# Patient Record
Sex: FEMALE | Race: WHITE | NOT HISPANIC OR LATINO | Employment: FULL TIME | URBAN - METROPOLITAN AREA
[De-identification: names, ages, dates, MRNs, and addresses within clinical notes are randomized per-mention and may not be internally consistent; named-entity substitution may affect disease eponyms.]

---

## 2017-05-03 ENCOUNTER — ALLSCRIPTS OFFICE VISIT (OUTPATIENT)
Dept: OTHER | Facility: OTHER | Age: 42
End: 2017-05-03

## 2017-05-03 DIAGNOSIS — Z12.31 ENCOUNTER FOR SCREENING MAMMOGRAM FOR MALIGNANT NEOPLASM OF BREAST: ICD-10-CM

## 2017-05-03 DIAGNOSIS — K21.9 GASTRO-ESOPHAGEAL REFLUX DISEASE WITHOUT ESOPHAGITIS: ICD-10-CM

## 2017-05-03 DIAGNOSIS — I83.891 VARICOSE VEINS OF RIGHT LOWER EXTREMITIES WITH OTHER COMPLICATIONS: ICD-10-CM

## 2017-05-03 DIAGNOSIS — E03.9 HYPOTHYROIDISM: ICD-10-CM

## 2017-05-10 ENCOUNTER — HOSPITAL ENCOUNTER (OUTPATIENT)
Dept: NON INVASIVE DIAGNOSTICS | Facility: CLINIC | Age: 42
Discharge: HOME | End: 2017-05-10
Payer: COMMERCIAL

## 2017-05-10 ENCOUNTER — APPOINTMENT (OUTPATIENT)
Dept: LAB | Facility: CLINIC | Age: 42
End: 2017-05-10
Payer: COMMERCIAL

## 2017-05-10 ENCOUNTER — GENERIC CONVERSION - ENCOUNTER (OUTPATIENT)
Dept: OTHER | Facility: OTHER | Age: 42
End: 2017-05-10

## 2017-05-10 DIAGNOSIS — E03.9 HYPOTHYROIDISM: ICD-10-CM

## 2017-05-10 DIAGNOSIS — I83.891 VARICOSE VEINS OF RIGHT LOWER EXTREMITIES WITH OTHER COMPLICATIONS: ICD-10-CM

## 2017-05-10 DIAGNOSIS — K21.9 GASTRO-ESOPHAGEAL REFLUX DISEASE WITHOUT ESOPHAGITIS: ICD-10-CM

## 2017-05-10 LAB
ALBUMIN SERPL BCP-MCNC: 3.9 G/DL (ref 3.5–5)
ALP SERPL-CCNC: 57 U/L (ref 46–116)
ALT SERPL W P-5'-P-CCNC: 23 U/L (ref 12–78)
ANION GAP SERPL CALCULATED.3IONS-SCNC: 7 MMOL/L (ref 4–13)
AST SERPL W P-5'-P-CCNC: 22 U/L (ref 5–45)
BILIRUB SERPL-MCNC: 1.1 MG/DL (ref 0.2–1)
BUN SERPL-MCNC: 13 MG/DL (ref 5–25)
CALCIUM SERPL-MCNC: 8.9 MG/DL (ref 8.3–10.1)
CHLORIDE SERPL-SCNC: 104 MMOL/L (ref 100–108)
CHOLEST SERPL-MCNC: 163 MG/DL (ref 50–200)
CO2 SERPL-SCNC: 28 MMOL/L (ref 21–32)
CREAT SERPL-MCNC: 0.94 MG/DL (ref 0.6–1.3)
GFR SERPL CREATININE-BSD FRML MDRD: >60 ML/MIN/1.73SQ M
GLUCOSE P FAST SERPL-MCNC: 86 MG/DL (ref 65–99)
HDLC SERPL-MCNC: 69 MG/DL (ref 40–60)
LDLC SERPL CALC-MCNC: 89 MG/DL (ref 0–100)
POTASSIUM SERPL-SCNC: 4 MMOL/L (ref 3.5–5.3)
PROT SERPL-MCNC: 7.9 G/DL (ref 6.4–8.2)
SODIUM SERPL-SCNC: 139 MMOL/L (ref 136–145)
T4 FREE SERPL-MCNC: 0.79 NG/DL (ref 0.76–1.46)
TRIGL SERPL-MCNC: 27 MG/DL
TSH SERPL DL<=0.05 MIU/L-ACNC: 25.3 UIU/ML (ref 0.36–3.74)

## 2017-05-10 PROCEDURE — 80061 LIPID PANEL: CPT

## 2017-05-10 PROCEDURE — 80053 COMPREHEN METABOLIC PANEL: CPT

## 2017-05-10 PROCEDURE — 84443 ASSAY THYROID STIM HORMONE: CPT

## 2017-05-10 PROCEDURE — 93971 EXTREMITY STUDY: CPT

## 2017-05-10 PROCEDURE — 36415 COLL VENOUS BLD VENIPUNCTURE: CPT

## 2017-05-10 PROCEDURE — 84439 ASSAY OF FREE THYROXINE: CPT

## 2017-05-11 ENCOUNTER — GENERIC CONVERSION - ENCOUNTER (OUTPATIENT)
Dept: OTHER | Facility: OTHER | Age: 42
End: 2017-05-11

## 2017-06-05 ENCOUNTER — HOSPITAL ENCOUNTER (OUTPATIENT)
Dept: MAMMOGRAPHY | Facility: HOSPITAL | Age: 42
Discharge: HOME | End: 2017-06-05
Payer: COMMERCIAL

## 2017-06-05 ENCOUNTER — GENERIC CONVERSION - ENCOUNTER (OUTPATIENT)
Dept: OTHER | Facility: OTHER | Age: 42
End: 2017-06-05

## 2017-06-05 DIAGNOSIS — Z12.31 ENCOUNTER FOR SCREENING MAMMOGRAM FOR MALIGNANT NEOPLASM OF BREAST: ICD-10-CM

## 2017-06-05 PROCEDURE — G0202 SCR MAMMO BI INCL CAD: HCPCS

## 2017-06-07 ENCOUNTER — ALLSCRIPTS OFFICE VISIT (OUTPATIENT)
Dept: OTHER | Facility: OTHER | Age: 42
End: 2017-06-07

## 2017-06-27 DIAGNOSIS — E03.9 HYPOTHYROIDISM: ICD-10-CM

## 2017-06-30 ENCOUNTER — APPOINTMENT (OUTPATIENT)
Dept: LAB | Facility: CLINIC | Age: 42
End: 2017-06-30
Payer: COMMERCIAL

## 2017-06-30 ENCOUNTER — GENERIC CONVERSION - ENCOUNTER (OUTPATIENT)
Dept: OTHER | Facility: OTHER | Age: 42
End: 2017-06-30

## 2017-06-30 ENCOUNTER — TRANSCRIBE ORDERS (OUTPATIENT)
Dept: LAB | Facility: CLINIC | Age: 42
End: 2017-06-30

## 2017-06-30 DIAGNOSIS — E03.9 HYPOTHYROIDISM: ICD-10-CM

## 2017-06-30 LAB
T4 FREE SERPL-MCNC: 0.77 NG/DL (ref 0.76–1.46)
TSH SERPL DL<=0.05 MIU/L-ACNC: 11 UIU/ML (ref 0.36–3.74)

## 2017-06-30 PROCEDURE — 84443 ASSAY THYROID STIM HORMONE: CPT

## 2017-06-30 PROCEDURE — 84439 ASSAY OF FREE THYROXINE: CPT

## 2017-06-30 PROCEDURE — 36415 COLL VENOUS BLD VENIPUNCTURE: CPT

## 2017-07-03 ENCOUNTER — GENERIC CONVERSION - ENCOUNTER (OUTPATIENT)
Dept: OTHER | Facility: OTHER | Age: 42
End: 2017-07-03

## 2017-07-05 ENCOUNTER — GENERIC CONVERSION - ENCOUNTER (OUTPATIENT)
Dept: OTHER | Facility: OTHER | Age: 42
End: 2017-07-05

## 2017-07-07 ENCOUNTER — ALLSCRIPTS OFFICE VISIT (OUTPATIENT)
Dept: OTHER | Facility: OTHER | Age: 42
End: 2017-07-07

## 2017-08-14 DIAGNOSIS — E03.9 HYPOTHYROIDISM: ICD-10-CM

## 2018-01-10 NOTE — RESULT NOTES
Verified Results  (1) TSH WITH FT4 REFLEX 30Jun2017 10:08AM Jordana Zuñiga    Order Number: [id number]     Test Name Result Flag Reference   TSH 10 996 uIU/mL H 0 358-3 740   A FT4 has been ordered      Patients undergoing fluorescein dye angiography may retain small amounts of fluorescein in the body for 48-72 hours post procedure  Samples containing fluorescein can produce falsely depressed TSH values  If the patient had this procedure,a specimen should be resubmitted post fluorescein clearance  The recommended reference ranges for TSH during pregnancy are as follows:  First trimester 0 1 to 2 5 uIU/mL  Second trimester  0 2 to 3 0 uIU/mL  Third trimester 0 3 to 3 0 uIU/m       Plan  Adult hypothyroidism    · (1) TSH WITH FT4 REFLEX; Status:Active;  Requested for:[id number];

## 2018-01-11 NOTE — RESULT NOTES
Verified Results  (1) COMPREHENSIVE METABOLIC PANEL [id number] 04:73ME [facility] Order Number: [id number]     Test Name Result Flag Reference   SODIUM 139 mmol/L  136-145   POTASSIUM 4 0 mmol/L  3 5-5 3   CHLORIDE 104 mmol/L  100-108   CARBON DIOXIDE 28 mmol/L  21-32   ANION GAP (CALC) 7 mmol/L  4-13   BLOOD UREA NITROGEN 13 mg/dL  5-25   CREATININE 0 94 mg/dL  0 60-1 30   Standardized to IDMS reference method   CALCIUM 8 9 mg/dL  8 3-10 1   BILI, TOTAL 1 10 mg/dL H 0 20-1 00   ALK PHOSPHATAS 57 U/L     ALT (SGPT) 23 U/L  12-78   AST(SGOT) 22 U/L  5-45   ALBUMIN 3 9 g/dL  3 5-5 0   TOTAL PROTEIN 7 9 g/dL  6 4-8 2   eGFR Non-African American      >60 0 ml/min/1 73sq [facility] Disease Education Program recommendations are as follows:  GFR calculation is accurate only with a steady state creatinine  Chronic Kidney disease less than 60 ml/min/1 73 sq  meters  Kidney failure less than 15 ml/min/1 73 sq  meters  GLUCOSE FASTING 86 mg/dL  65-99     (1) LIPID PANEL FASTING W DIRECT LDL REFLEX 56DLB2713 09:14AM [facility] Order Number: [id number]     Test Name Result Flag Reference   CHOLESTEROL 163 mg/dL     LDL CHOLESTEROL CALCULATED 89 mg/dL  0-100   Triglyceride:         Normal              <150 mg/dl       Borderline High    150-199 mg/dl       High               200-499 mg/dl       Very High          >499 mg/dl  Cholesterol:         Desirable        <200 mg/dl      Borderline High  200-239 mg/dl      High             >239 mg/dl  HDL Cholesterol:        High    >59 mg/dL      Low     <41 mg/dL  LDL Cholesterol:        Optimal          <100 mg/dl        Near Optimal     100-129 mg/dl        Above Optimal          Borderline High   130-159 mg/dl          High              160-189 mg/dl          Very High        >189 mg/dl  LDL CALCULATED:    This screening LDL is a calculated result    It does not have the accuracy of the Direct Measured LDL in the monitoring of patients with hyperlipidemia and/or statin therapy  Direct Measure LDL (JDF359) must be ordered separately in these patients  TRIGLYCERIDES 27 mg/dL  <=150   Specimen collection should occur prior to N-Acetylcysteine or Metamizole administration due to the potential for falsely depressed results  HDL,DIRECT 69 mg/dL H 40-60   Specimen collection should occur prior to Metamizole administration due to the potential for falsely depressed results  (1) TSH WITH FT4 REFLEX [id number] 09:14AM Particia Grammes   TW Order Number: [id number]     Test Name Result Flag Reference   TSH 25 301 uIU/mL H 0 358-3 740   A FT4 has been ordered      Patients undergoing fluorescein dye angiography may retain small amounts of fluorescein in the body for 48-72 hours post procedure  Samples containing fluorescein can produce falsely depressed TSH values  If the patient had this procedure,a specimen should be resubmitted post fluorescein clearance  The recommended reference ranges for TSH during pregnancy are as follows:  First trimester 0 1 to 2 5 uIU/mL  Second trimester  0 2 to 3 0 uIU/mL  Third trimester 0 3 to 3 0 uIU/m       Plan  Adult hypothyroidism    · Levothyroxine Sodium 25 MCG Oral Tablet; TAKE 1 TABLET DAILY FOR 7 DAYS  THEN INCREASE TO 2 TABLETS (50MCG) EVERY DAY   · (1) TSH WITH FT4 REFLEX; Status:Active;  Requested VVD:04VES0353;

## 2018-01-13 VITALS
RESPIRATION RATE: 18 BRPM | HEART RATE: 66 BPM | WEIGHT: 183 LBS | HEIGHT: 65 IN | OXYGEN SATURATION: 100 % | SYSTOLIC BLOOD PRESSURE: 128 MMHG | DIASTOLIC BLOOD PRESSURE: 78 MMHG | BODY MASS INDEX: 30.49 KG/M2 | TEMPERATURE: 97.5 F

## 2018-01-13 NOTE — RESULT NOTES
Verified Results  (1) TSH WITH FT4 REFLEX [id number] 09:14AM Jorge Max    Order Number: [id number]     Test Name Result Flag Reference   TSH 25 301 uIU/mL H 0 358-3 740   A FT4 has been ordered      Patients undergoing fluorescein dye angiography may retain small amounts of fluorescein in the body for 48-72 hours post procedure  Samples containing fluorescein can produce falsely depressed TSH values  If the patient had this procedure,a specimen should be resubmitted post fluorescein clearance            The recommended reference ranges for TSH during pregnancy are as follows:  First trimester 0 1 to 2 5 uIU/mL  Second trimester  0 2 to 3 0 uIU/mL  Third trimester 0 3 to 3 0 uIU/m   T4,FREE 0 79 ng/dL  0 76-1 46

## 2018-01-13 NOTE — RESULT NOTES
Verified Results  (1) TSH WITH FT4 REFLEX 30Jun2017 10:08AM Italo Gamboa    Order Number: [id number]     Test Name Result Flag Reference   TSH 10 996 uIU/mL H 0 358-3 740   A FT4 has been ordered      Patients undergoing fluorescein dye angiography may retain small amounts of fluorescein in the body for 48-72 hours post procedure  Samples containing fluorescein can produce falsely depressed TSH values  If the patient had this procedure,a specimen should be resubmitted post fluorescein clearance            The recommended reference ranges for TSH during pregnancy are as follows:  First trimester 0 1 to 2 5 uIU/mL  Second trimester  0 2 to 3 0 uIU/mL  Third trimester 0 3 to 3 0 uIU/m   T4,FREE 0 77 ng/dL  0 76-1 46

## 2018-01-15 NOTE — MISCELLANEOUS
Provider Comments  Provider Comments:   7/7/17-PT  MISSED APPT   TODAY/CW      Signatures   Electronically signed by : Chet Pruett DO; Jul 13 2017 10:31AM EST                       (Author)

## 2018-01-16 NOTE — RESULT NOTES
Verified Results  VAS LOWER LIMB VENOUS DUPLEX STUDY, UNILATERAL/LIMITED [id number] 07:56AM Lelan Stage Order Number: [id number]   Performing Comments: 39 y/o with varicose veins of right LE - r/o venous reflux   - Patient Instructions: To schedule this appointment, please contact Central Scheduling at [phone]  Test Name Result Flag Reference   VAS LOWER LIMB VENOUS DUPLEX STUDY, UNILATERAL/LIMITED (Report)     [facility] REPORT   CLINICAL:   Indications:   Patient presents due to chronic varicose veins of the right lower extremity   with increasing pain during the past 6-8 weeks  Physician wants to determine   patency and competency of the deep and superficial venous system  Clinical: Obvious varicose veins in the calf of the calf with some   discoloration  FINDINGS:      Segment     Right              Left                 AP(mm) Valve   Reflux Time Valve  Reflux Time    GSV Inguinal    8 2 Reflux     0 24831               GSV Prox Thigh   3 4                           GSV Mid Thigh   8 5                           GSV Dist Thigh   4 1                           CFV           Reflux     2 05819 Reflux   1 28758    GSV Knee      3 8                           FV Prox         Reflux     1 37506               GSV Prox Calf   4 7 Reflux     1 81105               GSV Mid Calf    1 8                           GSV Dist Calf   2 6                           GSV Ankle     2 2                           PFV           Competent                     Popliteal        Competent                     SSV Mid Calf    3 3                           SSV Knee      2 7 Competent                     SSV Ankle     1 3                                    CONCLUSION:   Impression:   RIGHT LOWER LIMB: ABNORMAL   This evaluation reveals evidence of deep and superficial venous incompetency     ( An anterior branch off the greater saphenous vein below the knee tracts to   the cluster of varicose veins at the anterior lateral aspect of the calf )   There is no evidence of acute or chronic deep vein thrombosis   No evidence of superficial thrombophlebitis is appreciated  Doppler evaluation shows a normal response to augmentation maneuvers  Popliteal, posterior tibial and anterior tibial arterial Doppler waveforms are   triphasic  LEFT LOWER LIMB LIMITED: NORMAL   Evaluation shows no evidence of thrombus in the common femoral vein  Doppler evaluation shows a normal response to augmentation maneuvers        SIGNATURE:   Electronically Signed by: Charleen Escalante MD, RPVI on 2017-05-10 07:11:45 PM

## 2018-01-16 NOTE — RESULT NOTES
Verified Results  * MAMMO SCREENING BILATERAL W CAD [id number] 07:38AM Jackie Colin Order Number: [id number]    - Patient Instructions: To schedule this appointment, please contact Central Scheduling at [phone]  Do not wear any perfume, powder, lotion or deodorant on breast or underarm area  Please bring your doctors order, referral (if needed) and insurance information with you on the day of the test  Failure to bring this information may result in this test being rescheduled  Arrive 15 minutes prior to your appointment time to register  On the day of your test, please bring any prior mammogram or breast studies with you that were not performed at a [facility]  Failure to bring prior exams may result in your test needing to be rescheduled  Test Name Result Flag Reference   MAMMO SCREENING BILATERAL W CAD (Report)     ADDENDUM:   Correction:No priors are available for comparison  This is a    baseline study  Patient History:   Family history of ovarian cancer in maternal grandmother  Taking unspecified hormones for 7 years  Patient is a former smoker  Patient's BMI is 29 3  Reason for exam: screening, asymptomatic  Mammo Screening Bilateral W CAD: June 5, 2017 - Check In #:    [de-identified]   Bilateral MLO, CC, and XCCL view(s) were taken  Technologist: KRISTEN Perez (R)(M)   No prior studies available for comparison  There are scattered fibroglandular densities  No dominant soft tissue mass, architectural distortion or    suspicious calcifications are noted in either breast  The skin    and nipple contours are within normal limits  IMPRESSION: No evidence of malignancy  No significant changes when compared with prior studies  ACR BI-RADSï¾® Assessments: BiRad:1 - Negative     Recommendation:   Routine screening mammogram of both breasts in 1 year  A    reminder letter will be scheduled     Analyzed by CAD     8-10% of cancers will be missed on mammography  Management of a    palpable abnormality must be based on clinical grounds  Patients   will be notified of their results via letter from our facility  Accredited by [employer] [facility] [employer] and [facility]  Transcription Location: [place] [address]: [id number]     Risk Value(s):   Tyrer-Cuzick 10 Year: 1 200%, Tyrer-Cuzick Lifetime: 9 400%,    Myriad Table: 3 0%, RUTHANN 5 Year: 0 4%, NCI Lifetime: 7 3%   Patient History:   Family history of ovarian cancer in maternal grandmother  Taking unspecified hormones for 7 years  Patient is a former smoker  Patient's BMI is 29 3  Reason for exam: screening, asymptomatic  Mammo Screening Bilateral W CAD: June 5, 2017 - Check In #:    [de-identified]   Bilateral MLO, CC, and XCCL view(s) were taken  Technologist: KRISTEN Jackson (R)(M)   No prior studies available for comparison  There are scattered fibroglandular densities  No dominant soft tissue mass, architectural distortion or    suspicious calcifications are noted in either breast  The skin    and nipple contours are within normal limits  No evidence of malignancy  No significant changes when compared with prior studies  ACR BI-RADSï¾® Assessments: BiRad:1 - Negative     Recommendation:   Routine screening mammogram of both breasts in 1 year  A    reminder letter will be scheduled  Analyzed by CAD     8-10% of cancers will be missed on mammography  Management of a    palpable abnormality must be based on clinical grounds  Patients   will be notified of their results via letter from our facility  Accredited by [employer] [facility] [employer] and [facility]       Transcription Location: [place] [address]: [id number]     Risk Value(s):   Tyrer-Cuzick 10 Year: 1 200%, Tyrer-Cuzick Lifetime: 9 400%,    Myriad Table: 3 0%, RUTHANN 5 Year: 0 4%, NCI Lifetime: 7 3%   Signed by:   Chuck Lyn MD   6/5/17

## 2018-01-17 NOTE — MISCELLANEOUS
Message   Recorded as Task   Date: 06/30/2017 12:41 PM, Created By: Yani Larson   Task Name: Call Patient with results   Assigned To: Yani Larson   Regarding Patient: Eyad Mayen, Status: In Progress   Comment:    Isai Willams - 30 Jun 2017 12:41 PM     Patient Phone: [phone]      let Jeromy Cesar know - thyroid BW results are back and are improved but not yet at goal   recommend to stop 50mcg dose and start 88mcg once a day  then, let's re-check thyroid blood work in 6-8 weeks  let me know if questions, Amberly Zamarripa - 30 Jun 2017 12:59 PM     TASK REASSIGNED: Previously Assigned To [email or web address] - 30 Jun 2017 1:46 PM     TASK IN PROGRESS   Prachi Romero - 30 Jun 2017 1:51 PM     TASK EDITED  Left msg for pt to c/b   Prachi Romero - 03 Jul 2017 2:36 PM     TASK EDITED  2nd message left for pt to c/b   Amberly Dempsey - 05 Jul 2017 11:24 AM     TASK REPLIED TO: Previously Assigned To [employer],Team  left 3rd message to cb     would you like a letter mailed home   Isai Willams - 05 Jul 2017 11:28 AM     TASK REASSIGNED: Previously Assigned To Isai Willams  if Jeromy Cesar does not come in for appt as scheduled on 7/7(2 days from now), please send letter    Moira Smith - 05 Jul 2017 12:44 PM     TASK EDITED  MESSAGE/INSTRUCTIONS GIVEN TO PT  [facility] [address]  [facility] ON FRIDAY AT HER APPT  Plan  Adult hypothyroidism    · Levothyroxine Sodium 88 MCG Oral Tablet; TAKE 1 TABLET DAILY   · (1) TSH WITH FT4 REFLEX; Status:Active;  Requested for:33Nkr7354;     Signatures   Electronically signed by : Chelsie Beth DO; Jul 5 2017 12:58PM EST                       (Author)

## 2018-01-18 NOTE — PROGRESS NOTES
Assessment    1  Varicose veins of right lower extremity with pain (454 8) (F35 053)    Plan    1  Begin or continue regular aerobic exercise  Gradually work up to at least {count1}   sessions of {dur1} of exercise a week ; Status:Complete;   Done: 48ZRC5637   2  Keep your leg elevated whenever you can to decrease swelling and increase circulation ;   Status:Complete;   Done: 64XSI3527   3  Support stockings can help keep the blood from pooling in the small veins in your feet   and legs ; Status:Complete;   Done: 65JAV7750   4  Gradient compression stocking, below knee, 20-30mm Hg, each; Status:Complete;     Done: 89CRV2963   5  Follow-up visit in 3 months Evaluation and Treatment  Follow-up with physician  Status:   Complete  Done: 01MTY0802    Discussion/Summary  Discussion Summary: This patient has symptomatic varicosities to the right lower extremity  Venous reflux assessment with note of both deep and superficial reflux  We discussed the pathophysiology of venous disease/valvular reflux  She will begin a course of conservative management to include the daily use of 20-30 mmHg gradient compression stockings, frequent elevation, regular physical activity, and maintenance of healthy weight  She will return to the office in 3 months to reevaluate impact of conservative therapy on severity of symptoms  Chief Complaint  Chief Complaint Free Text Note Form: "My right leg hurts " SHRUTHI 5/10/17       History of Present Illness  Varicose Veins [facility]: The patient is being seen for a consultation regarding varicose veins  Symptoms:  right bulging veins, right dilated veins, right leg swelling, right muscle cramps, right spider veins, itching on the right side and right leg pain, but no stasis dermatitis, no cellulitis, no hyperpigmentation, no venous ulcers, no dry skin and no bleeding  The patient describes the pain as aching, itching, burning and heavy     This patient has no history of DVT, pulmonary embolism, superficial venous thrombosis, or a hypercoagulable state  Evaluation and Treatment History: She was previously evaluated by a primary physician  This patient has had no prior surgical treatment of the venous system  This patient is not currently utilizing any conservative management strategies to manage the current symptoms  Free Text HPI: Ms Chilango Khanna is new to the practice, referred by Dr Pop Padilla  She is here for evaluation of painful varicosities to right shin  She works as a , spending long periods of time on her feet  She complains of aching, heavy pain throughout the right lower extremity at end of day  She is also experiencing itching, burning pain overlying the cluster of varicosities on her right shin  Denies symptoms to the left lower extremity  No edema  Review of Systems  Complete Female - Vasc:   Constitutional: no loss in appetite, recent 40 lb weight gain and feeling tired, but no fever, no chills and no recent weight loss  Eyes: no sudden vision loss, no blurred vision and no double vision, but no eye pain, no eyesight problems, no dryness of the eyes, eyes not red, no purulent discharge from the eyes, no itching of the eyes and wears glasses  ENT: no loss of hearing, no nosebleeds, no hoarseness  Cardiovascular: irregular heart rate, no leg pain with walking and no bleeding veins, but no chest pain, no intermittent leg claudication, painful veins and no palpitations  Respiratory: No sob, no wheezing, no cough, no sob with exertion, no orthopnea  Gastrointestinal: No nausea, No vomiting, no diarrhea, no blood in stool  Genitourinary: no dysuria, no Hematuria,no urinary incontinence  Musculoskeletal: no lumbar pain and limb swelling, but no joint swelling, no limb pain, no myalgias and no joint stiffness  Integumentary: no rash, no lesions, no wounds, no ulcer     Neurological: numbness, no dementia and dizziness, but no headache, no confusion, no limb weakness, no convulsions, no fainting and no difficulty walking  Psychiatric: no depression, no mood disorders, no anxiety  Hematologic/Lymphatic: swollen glands in the neck, but no swollen glands, no tendency for easy bleeding and no tendency for easy bruising  ROS Reviewed:   ROS reviewed  Active Problems    1  Acid reflux (530 81) (K21 9)   2  Adult hypothyroidism (244 9) (E03 9)   3  Encounter for screening mammogram for breast cancer (V76 12) (Z12 31)   4  Varicose veins of right lower extremity with edema (454 8) (N79 337)    Past Medical History    1  No pertinent past medical history  Active Problems And Past Medical History Reviewed: The active problems and past medical history were reviewed and updated today  Surgical History    1  Denied: History Of Prior Surgery  Surgical History Reviewed: The surgical history was reviewed and updated today  Family History  Mother    1  Family history of Anxiety and depression   2  Family history of heart disease (V17 49) (Z82 49)  Father    3  Family history of diabetes mellitus (V18 0) (Z83 3)   4  Family history of heart disease (V17 49) (Z82 49)  Family History Reviewed: The family history was reviewed and updated today  Social History    · Currently working   · Drinks coffee   · Former smoker (D74 66) (J42 114)   · Has 4 children   ·    · No alcohol use   · No illicit drug use  Social History Reviewed: The social history was reviewed and updated today  Current Meds   1  Levothyroxine Sodium 25 MCG Oral Tablet; TAKE 1 TABLET DAILY FOR 7 DAYS THEN   INCREASE TO 2 TABLETS (50MCG) EVERY DAY; Therapy: 13PNS0345 to (Evaluate:30Diw8545); Last Rx:76Xhr3307 Ordered  Medication List Reviewed: The medication list was reviewed and updated today  Allergies    1   No Known Drug Allergies    Vitals  Vital Signs    Recorded: 34CYL9106 04:10PM   Heart Rate 68, L Radial   Respiration 16   Systolic 291, LUE   Diastolic 82, LUE Height 5 ft 4 5 in   Weight 183 lb    BMI Calculated 30 93   BSA Calculated 1 89     Physical Exam    Femoral: right 2+ and left 2+  Posterior tibialis: left 2+  Dorsalis pedis: right 2+ and left 2+  Distal Pulse Exam: Normal Capillary Refill  Extremities: No upper or lower extremity edema  LE Varicose Veins: right leg truncal veins and right leg reticular veins  The heart rate was normal  The rhythm was regular  Pulmonary   Respiratory effort: No increased work of breathing or signs of respiratory distress  Psychiatric   Orientation to person, place and time: Normal    Eyes   Conjunctiva and lids: No swelling, erythema, or discharge  Ears, Nose, Mouth, and Throat   Hearing: Normal    Neck   Neck: No jugular venous distention, normal ROM and extension  No cervical adenopathy, trachea midline, neck supple  Neurologic Sensory exam normal   Motor skills intact  Musculoskeletal   Gait and station: Normal    Muscle strength/tone: Normal    Skin   Skin and subcutaneous tissue: Normal without rashes or lesions     Venous Disease: No lipodermatosclerosis, stasis dermatitis, hyperpigmentation, or atrophie kecia noted on exam       Future Appointments    Date/Time Provider Specialty Site   07/07/2017 08:00 AM Fransisco Lopez DO Internal Medicine [facility] INTERNAL MED     Signatures   Electronically signed by : CAMILLA Haider; Jun 7 2017  4:42PM EST                       (Author)    Electronically signed by : Marylin Purcell MD; Jun 12 2017  3:34PM EST

## 2018-01-22 VITALS
DIASTOLIC BLOOD PRESSURE: 82 MMHG | BODY MASS INDEX: 30.49 KG/M2 | HEART RATE: 68 BPM | SYSTOLIC BLOOD PRESSURE: 122 MMHG | HEIGHT: 65 IN | RESPIRATION RATE: 16 BRPM | WEIGHT: 183 LBS

## 2018-02-21 ENCOUNTER — OFFICE VISIT (OUTPATIENT)
Dept: INTERNAL MEDICINE CLINIC | Facility: CLINIC | Age: 43
End: 2018-02-21
Payer: COMMERCIAL

## 2018-02-21 VITALS
HEIGHT: 64 IN | BODY MASS INDEX: 31.96 KG/M2 | RESPIRATION RATE: 18 BRPM | DIASTOLIC BLOOD PRESSURE: 76 MMHG | HEART RATE: 96 BPM | WEIGHT: 187.2 LBS | SYSTOLIC BLOOD PRESSURE: 128 MMHG | OXYGEN SATURATION: 98 % | TEMPERATURE: 98.3 F

## 2018-02-21 DIAGNOSIS — E03.9 ADULT HYPOTHYROIDISM: Primary | ICD-10-CM

## 2018-02-21 DIAGNOSIS — Z63.79 STRESSFUL LIFE EVENT AFFECTING FAMILY: ICD-10-CM

## 2018-02-21 PROBLEM — I83.811 VARICOSE VEINS OF RIGHT LOWER EXTREMITY WITH PAIN: Status: ACTIVE | Noted: 2017-06-07

## 2018-02-21 PROBLEM — K21.9 ACID REFLUX: Status: ACTIVE | Noted: 2017-05-03

## 2018-02-21 PROBLEM — I83.891 VARICOSE VEINS OF RIGHT LOWER EXTREMITY WITH EDEMA: Status: ACTIVE | Noted: 2017-05-03

## 2018-02-21 PROCEDURE — 99214 OFFICE O/P EST MOD 30 MIN: CPT | Performed by: INTERNAL MEDICINE

## 2018-02-21 PROCEDURE — 3008F BODY MASS INDEX DOCD: CPT | Performed by: INTERNAL MEDICINE

## 2018-02-21 RX ORDER — LEVOTHYROXINE SODIUM 88 UG/1
1 TABLET ORAL DAILY
COMMUNITY
Start: 2017-05-10 | End: 2018-02-21 | Stop reason: SDUPTHER

## 2018-02-21 RX ORDER — LEVOTHYROXINE SODIUM 0.05 MG/1
50 TABLET ORAL DAILY
Qty: 30 TABLET | Refills: 2 | Status: SHIPPED | OUTPATIENT
Start: 2018-02-21 | End: 2018-04-25 | Stop reason: SDUPTHER

## 2018-02-21 NOTE — PATIENT INSTRUCTIONS
1  Start levothyroxine once a day  2  Fasting blood work in 7-8 weeks  3  Return in 2 months    Hypothyroidism   AMBULATORY CARE:   Hypothyroidism  is a condition that develops when the thyroid gland does not make enough thyroid hormone  Thyroid hormones help control body temperature, heart rate, growth, and weight  Common signs and symptoms include the following: The signs and symptoms may develop slowly, sometimes over several years  · Exhaustion    · Sensitivity to cold    · Headaches or decreased concentration    · Muscle aches or weakness    · Constipation     · Dry, flaky skin or brittle nails    · Thinning hair    · Heavy or irregular monthly periods    · Depression or irritability  Call 911 for any of the following:   · You have sudden chest pain or shortness of breath  · You have a seizure  · You feel like you are going to faint  Seek care immediately if:   · You have diarrhea, tremors, or trouble sleeping  · Your legs, ankles, or feet are swollen  Contact your healthcare provider if:   · You have a fever  · You have chills, a cough, or feel weak and achy  · You have pain and swelling in your muscles and joints  · Your skin is itchy, swollen, or you have a rash  · Your signs and symptoms return or get worse, even after treatment  · You have questions or concerns about your condition or care  Treatment:  Thyroid hormone replacement medicine may bring your thyroid hormone level back to normal  Ask your healthcare provider for more information on other medicines you may need  Follow up with your healthcare provider as directed: You may need to return for more blood tests to check your thyroid hormone level  This will show if you are getting the right amount of thyroid medicine  Write down your questions so you remember to ask them during your visits    © 2017 [address] [employer] Information is for End User's use only and may not be sold, redistributed or otherwise used for commercial purposes  All illustrations and images included in CareNotes® are the copyrighted property of [employer]  or [address]  The above information is an  only  It is not intended as medical advice for individual conditions or treatments  Talk to your doctor, nurse or pharmacist before following any medical regimen to see if it is safe and effective for you

## 2018-02-21 NOTE — PROGRESS NOTES
Assessment/Plan:       Diagnoses and all orders for this visit:    Adult hypothyroidism  Comments:  TFT's improved on 50mcg dose last year, re-start now but will likely need increase in dose based on f/u BW in 7-8wks, precuations given should sx not improve  Orders:  -     levothyroxine 50 mcg tablet; Take 1 tablet (50 mcg total) by mouth daily  -     TSH, 3rd generation with T4 reflex; Future  -     Basic metabolic panel; Future  -     Hepatic function panel; Future    Stressful life event affecting family  Comments:  pt coping, for now  f/u 2 mos          Subjective:      Patient ID: Hugo Aggarwal is a 41 y o  female  HPI    Here for follow up, not seen in 8-10 mos  Hx of hypothyroidism, ran out of medication 2-3 mos ago  Has been under more stress lately, son got expelled from school and 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  Not sleeping as well & c/o weight gain, fatigue and being occ forgetful, but denies depression   feels sx are thyroid related  dx'd as hypothyroid by previous PCP who then retired after this  deneis neck swelling  No other complaints    Social History     Social History    Marital status: /Civil Union     Spouse name: N/A    Number of children: N/A    Years of education: N/A     Occupational History    Not on file  Social History Main Topics    Smoking status: Former Smoker     Quit date: 2/21/2015    Smokeless tobacco: Never Used    Alcohol use No    Drug use: No    Sexual activity: Not on file     Other Topics Concern    Not on file     Social History Narrative    Drinks 4 cups of coffee daily      History reviewed  No pertinent past medical history    Vitals:    02/21/18 1531   BP: 128/76   Pulse: 96   Resp: 18   Temp: 98 3 °F (36 8 °C)   SpO2: 98%   Weight: 84 9 kg (187 lb 3 2 oz)   Height: 5' 4" (1 626 m)       Current Outpatient Prescriptions:     levothyroxine 50 mcg tablet, Take 1 tablet (50 mcg total) by mouth daily, Disp: 30 tablet, Rfl: 2  No Known Allergies      Review of Systems   Constitutional: Positive for fatigue  Negative for fever  HENT: Negative for congestion  Respiratory: Negative for chest tightness  Cardiovascular: Negative for chest pain  Gastrointestinal: Negative for abdominal pain  Genitourinary: Negative for difficulty urinating  Musculoskeletal: Negative for neck pain  Neurological: Negative for headaches  +Memory issues   Psychiatric/Behavioral: Negative for dysphoric mood (but stressed)  Objective:      /76   Pulse 96   Temp 98 3 °F (36 8 °C)   Resp 18   Ht 5' 4" (1 626 m)   Wt 84 9 kg (187 lb 3 2 oz)   SpO2 98%   BMI 32 13 kg/m²          Physical Exam   Constitutional: She is oriented to person, place, and time  She appears well-developed and well-nourished  HENT:   Head: Normocephalic and atraumatic  Right Ear: External ear normal    Left Ear: External ear normal    Eyes: Conjunctivae are normal  Pupils are equal, round, and reactive to light  Neck: No thyromegaly present  Cardiovascular: Normal rate, regular rhythm and normal heart sounds  No murmur heard  Pulmonary/Chest: Effort normal and breath sounds normal  She has no wheezes  She has no rales  Abdominal: Soft  Bowel sounds are normal  There is no tenderness  Musculoskeletal: She exhibits no edema  Lymphadenopathy:     She has no cervical adenopathy  Neurological: She is alert and oriented to person, place, and time  Psychiatric: She has a normal mood and affect  Her behavior is normal    Vitals reviewed        Results for orders placed or performed in visit on 06/30/17   TSH, 3rd generation with T4 reflex   Result Value Ref Range    TSH 3RD GENERATON 10 996 (H) 0 358 - 3 740 uIU/mL   T4, free   Result Value Ref Range    Free T4 0 77 0 76 - 1 46 ng/dL

## 2018-04-24 ENCOUNTER — APPOINTMENT (OUTPATIENT)
Dept: LAB | Facility: CLINIC | Age: 43
End: 2018-04-24
Payer: COMMERCIAL

## 2018-04-24 ENCOUNTER — TRANSCRIBE ORDERS (OUTPATIENT)
Dept: LAB | Facility: CLINIC | Age: 43
End: 2018-04-24

## 2018-04-24 DIAGNOSIS — E03.9 ADULT HYPOTHYROIDISM: ICD-10-CM

## 2018-04-24 LAB
ALBUMIN SERPL BCP-MCNC: 3.6 G/DL (ref 3.5–5)
ALP SERPL-CCNC: 57 U/L (ref 46–116)
ALT SERPL W P-5'-P-CCNC: 27 U/L (ref 12–78)
ANION GAP SERPL CALCULATED.3IONS-SCNC: 7 MMOL/L (ref 4–13)
AST SERPL W P-5'-P-CCNC: 17 U/L (ref 5–45)
BILIRUB DIRECT SERPL-MCNC: 0.19 MG/DL (ref 0–0.2)
BILIRUB SERPL-MCNC: 0.8 MG/DL (ref 0.2–1)
BUN SERPL-MCNC: 11 MG/DL (ref 5–25)
CALCIUM SERPL-MCNC: 9 MG/DL (ref 8.3–10.1)
CHLORIDE SERPL-SCNC: 104 MMOL/L (ref 100–108)
CO2 SERPL-SCNC: 27 MMOL/L (ref 21–32)
CREAT SERPL-MCNC: 0.78 MG/DL (ref 0.6–1.3)
GFR SERPL CREATININE-BSD FRML MDRD: 95 ML/MIN/1.73SQ M
GLUCOSE P FAST SERPL-MCNC: 95 MG/DL (ref 65–99)
POTASSIUM SERPL-SCNC: 4 MMOL/L (ref 3.5–5.3)
PROT SERPL-MCNC: 7.6 G/DL (ref 6.4–8.2)
SODIUM SERPL-SCNC: 138 MMOL/L (ref 136–145)
T4 FREE SERPL-MCNC: 0.71 NG/DL (ref 0.76–1.46)
TSH SERPL DL<=0.05 MIU/L-ACNC: 14.19 UIU/ML (ref 0.36–3.74)

## 2018-04-24 PROCEDURE — 36415 COLL VENOUS BLD VENIPUNCTURE: CPT

## 2018-04-24 PROCEDURE — 80048 BASIC METABOLIC PNL TOTAL CA: CPT

## 2018-04-24 PROCEDURE — 84439 ASSAY OF FREE THYROXINE: CPT

## 2018-04-24 PROCEDURE — 84443 ASSAY THYROID STIM HORMONE: CPT

## 2018-04-24 PROCEDURE — 80076 HEPATIC FUNCTION PANEL: CPT

## 2018-04-25 ENCOUNTER — TELEPHONE (OUTPATIENT)
Dept: INTERNAL MEDICINE CLINIC | Facility: CLINIC | Age: 43
End: 2018-04-25

## 2018-04-25 DIAGNOSIS — E03.9 ADULT HYPOTHYROIDISM: ICD-10-CM

## 2018-04-25 RX ORDER — LEVOTHYROXINE SODIUM 0.07 MG/1
75 TABLET ORAL DAILY
Qty: 30 TABLET | Refills: 3 | Status: SHIPPED | OUTPATIENT
Start: 2018-04-25 | End: 2019-12-08

## 2018-04-25 NOTE — TELEPHONE ENCOUNTER
Pt would like to know BW results, labs were done yesterday at [facility])  Pt would like to know if Thyroid medication will need to be increased? Please advise  QUETA  Pt rescheduled appt today   Rescheduled for May 11th @ 3:45pm

## 2018-04-25 NOTE — TELEPHONE ENCOUNTER
Thyroid BW is showing thyroid dose is bit too low    If Abraham Pierre is taking thyroid medication every day on empty stomach, recommend to increase dose to 75mcg per day    Will send order for new rx to her pharmacy    thx    Results for orders placed or performed in visit on 04/24/18   TSH, 3rd generation with T4 reflex   Result Value Ref Range    TSH 3RD GENERATON 14 192 (H) 0 358 - 3 740 uIU/mL   Basic metabolic panel   Result Value Ref Range    Sodium 138 136 - 145 mmol/L    Potassium 4 0 3 5 - 5 3 mmol/L    Chloride 104 100 - 108 mmol/L    CO2 27 21 - 32 mmol/L    Anion Gap 7 4 - 13 mmol/L    BUN 11 5 - 25 mg/dL    Creatinine 0 78 0 60 - 1 30 mg/dL    Glucose, Fasting 95 65 - 99 mg/dL    Calcium 9 0 8 3 - 10 1 mg/dL    eGFR 95 ml/min/1 73sq m   Hepatic function panel   Result Value Ref Range    Total Bilirubin 0 80 0 20 - 1 00 mg/dL    Bilirubin, Direct 0 19 0 00 - 0 20 mg/dL    Alkaline Phosphatase 57 46 - 116 U/L    AST 17 5 - 45 U/L    ALT 27 12 - 78 U/L    Total Protein 7 6 6 4 - 8 2 g/dL    Albumin 3 6 3 5 - 5 0 g/dL   T4, free   Result Value Ref Range    Free T4 0 71 (L) 0 76 - 1 46 ng/dL

## 2018-12-26 ENCOUNTER — OFFICE VISIT (OUTPATIENT)
Dept: URGENT CARE | Facility: CLINIC | Age: 43
End: 2018-12-26
Payer: COMMERCIAL

## 2018-12-26 VITALS
WEIGHT: 189 LBS | SYSTOLIC BLOOD PRESSURE: 117 MMHG | TEMPERATURE: 97.9 F | DIASTOLIC BLOOD PRESSURE: 80 MMHG | OXYGEN SATURATION: 96 % | RESPIRATION RATE: 20 BRPM | HEIGHT: 64 IN | BODY MASS INDEX: 32.27 KG/M2 | HEART RATE: 61 BPM

## 2018-12-26 DIAGNOSIS — H01.02B SQUAMOUS BLEPHARITIS OF UPPER AND LOWER EYELIDS OF BOTH EYES: ICD-10-CM

## 2018-12-26 DIAGNOSIS — H10.13 ALLERGIC CONJUNCTIVITIS OF BOTH EYES: Primary | ICD-10-CM

## 2018-12-26 DIAGNOSIS — H01.02A SQUAMOUS BLEPHARITIS OF UPPER AND LOWER EYELIDS OF BOTH EYES: ICD-10-CM

## 2018-12-26 DIAGNOSIS — J45.909 BRONCHITIS, ALLERGIC, UNSPECIFIED ASTHMA SEVERITY, UNCOMPLICATED: ICD-10-CM

## 2018-12-26 PROCEDURE — G0382 LEV 3 HOSP TYPE B ED VISIT: HCPCS | Performed by: PHYSICIAN ASSISTANT

## 2018-12-26 RX ORDER — METHYLPREDNISOLONE 4 MG/1
TABLET ORAL
Qty: 21 TABLET | Refills: 0 | Status: SHIPPED | OUTPATIENT
Start: 2018-12-26 | End: 2019-01-05

## 2018-12-26 NOTE — PROGRESS NOTES
[facility]        NAME: Mervat Melendez is a 41 y o  female  : 1975    MRN: [id number]  DATE: 2018  TIME: 2:33 PM    Assessment and Plan   Allergic conjunctivitis of both eyes [H10 13]  1  Allergic conjunctivitis of both eyes     2  Squamous blepharitis of upper and lower eyelids of both eyes  Methylprednisolone 4 MG TBPK   3  Bronchitis, allergic, unspecified asthma severity, uncomplicated  Methylprednisolone 4 MG TBPK     Patient Instructions   Take steroid as directed with food and water  While on this medication do not take any NSAIDs including ibuprofen (Advil), naproxen (Aleve), etc   Avoid caffeinated beverages while taking this medication  Begin an antihistamine such as Allegra or Benadryl  Apply a warm compress to your eyes for 15 -20 minutes, as needed for swelling and discomfort relief  You may continue to apply an emollient cream or lotion to your eyes, however, you should avoid your eye lash lines  Use a cool mist humidifier at bedtime, turning on hours prior to bed with your bedroom door shut for maximum relief  Follow up with your family doctor in 3-5 days  Proceed to the ER if symptoms worsen  The diagnosis, etiology, expected course of illness, and treatment plan were reviewed in length  All questions answered  Precautions given  Patient verbalized understanding and agreement with the treatment plan  Chief Complaint     Chief Complaint   Patient presents with    Eye Problem     B/L eyes swollen and red 3 weeks     Cough     dry cough past 3 weeks      History of Present Illness   28-year-old female accompanied by her  presenting with c/o eye irritation and cough x3 weeks  The patient notes that just prior to onset 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  She notes that her final destination on day prior to onset of symptoms was at a construction site that was full of dust, located in [facility]   She notes the following morning she awoke with redness and irritation of her eyes  She noted a minor redness and swelling of both upper and lower eyelids bilaterally and a dry cough  The symptoms have persisted over the past 3 weeks and remain unchanged  She notes redness and swelling did increase over the first few days of illness now have remained persistent with no continued enlargement of the area involved  She denies any wheezing, shortness of breath, runny nose, trouble swallowing, sore throat, or sensation of throat closing  She denies past history to dust or seasonal allergies  No new soaps, lotions, detergents, foods, or other products  No previous occurrence  Review of Systems   Review of Systems   HENT: Negative for voice change  Respiratory: Negative for shortness of breath and wheezing  Cardiovascular: Negative for chest pain and palpitations  Gastrointestinal: Negative for abdominal pain, diarrhea, nausea and vomiting  Musculoskeletal: Negative for arthralgias and myalgias  Skin: Negative for rash  Neurological: Negative for dizziness, light-headedness and headaches  Current Medications       Current Outpatient Prescriptions:     levonorgestrel (MIRENA) 20 MCG/24HR IUD, 1 each by Intrauterine route once, Disp: , Rfl:     levothyroxine 75 mcg tablet, Take 1 tablet (75 mcg total) by mouth daily, Disp: 30 tablet, Rfl: 3    Methylprednisolone 4 MG TBPK, Use as directed on package, Disp: 21 tablet, Rfl: 0    Current Allergies     Allergies as of 12/26/2018 - Reviewed 12/26/2018   Allergen Reaction Noted    Sulfa antibiotics Rash 12/26/2018            The following portions of the patient's history were reviewed and updated as appropriate: allergies, current medications, past family history, past medical history, past social history, past surgical history and problem list      History reviewed  No pertinent past medical history  History reviewed   No pertinent surgical history  Family History   Problem Relation Age of Onset    Depression Mother     Drug abuse Mother     Anxiety disorder Mother         anxiety and depression    Heart disease Mother         dx in 19's    Substance Abuse Father     Diabetes Father     Heart disease Father         dx in 42's    Dementia Maternal Grandmother     Substance Abuse Maternal Grandfather        Medications have been verified  Objective   /80 (BP Location: Right arm, Patient Position: Sitting, Cuff Size: Standard)   Pulse 61   Temp 97 9 °F (36 6 °C) (Oral)   Resp 20   Ht 5' 4" (1 626 m)   Wt 85 7 kg (189 lb)   SpO2 96%   BMI 32 44 kg/m²      Physical Exam     Physical Exam   Constitutional: She is oriented to person, place, and time  Vital signs are normal  She appears well-developed and well-nourished  She is cooperative  She appears ill  No distress  HENT:   Head: Normocephalic and atraumatic  Mouth/Throat: Oropharynx is clear and moist and mucous membranes are normal  Mucous membranes are not pale, not dry and not cyanotic  No oral lesions  No oropharyngeal exudate, posterior oropharyngeal edema or tonsillar abscesses  Mild injection of th posterior oropharynx  Eyes: EOM are normal  Lids are everted and swept, no foreign bodies found  Right eye exhibits no chemosis, no discharge, no exudate and no hordeolum  No foreign body present in the right eye  Left eye exhibits no chemosis, no discharge, no exudate and no hordeolum  No foreign body present in the left eye  Right conjunctiva is injected  Left conjunctiva is injected  No scleral icterus  Fundoscopic exam:       The right eye shows red reflex  The left eye shows red reflex  Upper and lower eyelids appear erythematous and mildly edematous, with flaking of overlying skin  No periorbital edema or erythema  Orbits are NTTP  Neck: Phonation normal  Neck supple  No neck rigidity  No edema and no erythema present     Cardiovascular: Normal rate, regular rhythm and normal heart sounds  Exam reveals no gallop and no friction rub  No murmur heard  Pulmonary/Chest: Effort normal and breath sounds normal  No stridor  No respiratory distress  She has no decreased breath sounds  She has no wheezes  She has no rhonchi  She has no rales  Coarse BS throughout  Abdominal: Soft  Bowel sounds are normal  She exhibits no distension and no mass  There is no tenderness  There is no rebound  Lymphadenopathy:     She has no cervical adenopathy  Neurological: She is alert and oriented to person, place, and time  No cranial nerve deficit  She exhibits normal muscle tone  Coordination normal    Skin: Skin is warm and dry  No rash noted  She is not diaphoretic  Psychiatric: She has a normal mood and affect  Her behavior is normal    Nursing note and vitals reviewed

## 2018-12-26 NOTE — PATIENT INSTRUCTIONS
Take steroid as directed with food and water  While on this medication do not take any NSAIDs including ibuprofen (Advil), naproxen (Aleve), etc   Avoid caffeinated beverages while taking this medication  Begin an antihistamine such as Allegra or Benadryl  Apply a warm compress to your eyes for 15 -20 minutes, as needed for swelling and discomfort relief  You may continue to apply an emollient cream or lotion to your eyes, however, you should avoid your eye lash lines  Use a cool mist humidifier at bedtime, turning on hours prior to bed with your bedroom door shut for maximum relief  Follow up with your family doctor in 3-5 days  Proceed to the ER if symptoms worsen  Blepharitis   WHAT YOU NEED TO KNOW:   Blepharitis is inflammation of one or both eyelids  Your eyelid, eyelashes, oil glands, or whites of the eye may be affected  DISCHARGE INSTRUCTIONS:   Medicines:   · Medicines  can help decrease pain and swelling, or treat an infection  · Take your medicine as directed  Contact your healthcare provider if you think your medicine is not helping or if you have side effects  Tell him or her if you are allergic to any medicine  Keep a list of the medicines, vitamins, and herbs you take  Include the amounts, and when and why you take them  Bring the list or the pill bottles to follow-up visits  Carry your medicine list with you in case of an emergency  Follow up with your healthcare provider as directed:  Write down your questions so you remember to ask them during your visits  Care for your eyelid:  Always wash your hands with soap and water before and after eye care:  · Use artificial tears  twice a day if you have dry eye  · Apply a warm compress  for 10 minutes once a day to loosen crusts and to decrease itching and burning  · Gently scrub your upper and lower eyelid  with 2 to 3 drops of baby shampoo in ½ cup warm water 2 times a day   This will help open your clogged oil glands and remove pus or other material stuck to your eyelid  · Massage your upper and lower eyelid in small circles for 5 seconds  to loosen oil plugs and to decrease inflammation  · Do not wear contact lenses or eye makeup  until your eye has healed  Contact your healthcare provider if:   · Your vision changes  · Your signs and symptoms get worse, even after treatment  · Your signs and symptoms return  · You have a lump on your eyelid  · You have a pus-filled sore on your eyelid  · You have questions or concerns about your condition or care  Return to the emergency department if:   · You have severe pain  · You have vision loss  © 2017 [employer]  Information is for End User's use only and may not be sold, redistributed or otherwise used for commercial purposes  All illustrations and images included in CareNotes® are the copyrighted property of [employer]  or Giovanni Ibarra  The above information is an  only  It is not intended as medical advice for individual conditions or treatments  Talk to your doctor, nurse or pharmacist before following any medical regimen to see if it is safe and effective for you  [employer]

## 2019-01-05 ENCOUNTER — HOSPITAL ENCOUNTER (EMERGENCY)
Facility: HOSPITAL | Age: 44
Discharge: HOME | End: 2019-01-05
Attending: EMERGENCY MEDICINE
Payer: COMMERCIAL

## 2019-01-05 VITALS
TEMPERATURE: 98.5 F | SYSTOLIC BLOOD PRESSURE: 112 MMHG | HEIGHT: 65 IN | RESPIRATION RATE: 16 BRPM | HEART RATE: 74 BPM | BODY MASS INDEX: 31.45 KG/M2 | DIASTOLIC BLOOD PRESSURE: 72 MMHG | OXYGEN SATURATION: 97 %

## 2019-01-05 DIAGNOSIS — H10.10 ALLERGIC CONJUNCTIVITIS: ICD-10-CM

## 2019-01-05 DIAGNOSIS — H01.003 BLEPHARITIS OF BOTH EYES: Primary | ICD-10-CM

## 2019-01-05 DIAGNOSIS — H01.006 BLEPHARITIS OF BOTH EYES: Primary | ICD-10-CM

## 2019-01-05 PROCEDURE — 99282 EMERGENCY DEPT VISIT SF MDM: CPT

## 2019-01-05 RX ORDER — OLOPATADINE HYDROCHLORIDE 1 MG/ML
1 SOLUTION/ DROPS OPHTHALMIC 2 TIMES DAILY PRN
Qty: 5 ML | Refills: 0 | Status: SHIPPED | OUTPATIENT
Start: 2019-01-05 | End: 2019-12-08

## 2019-01-05 RX ORDER — ERYTHROMYCIN 5 MG/G
0.5 OINTMENT OPHTHALMIC ONCE
Status: COMPLETED | OUTPATIENT
Start: 2019-01-05 | End: 2019-01-05

## 2019-01-05 RX ORDER — ERYTHROMYCIN 5 MG/G
OINTMENT OPHTHALMIC
Qty: 3.5 G | Refills: 0 | Status: SHIPPED | OUTPATIENT
Start: 2019-01-05 | End: 2019-12-08

## 2019-01-05 RX ADMIN — ERYTHROMYCIN 0.5 INCH: 5 OINTMENT OPHTHALMIC at 20:28

## 2019-01-06 NOTE — ED PROVIDER NOTES
History  Chief Complaint   Patient presents with    Eye Problem     PT arrives to ED with complaint of eye swelling and irritations for the last 5 weeks  Pt states that her tears are "sticky" and her eyes have been red and swollen with no relief  Pt was perscribed a steroid that help with the swelling but not with the other symptoms  Pt has also been taking allergy medication  Meera Rashid is a 41 y o  female with a PMHx of Hypothyroidism who presents to the ED with complaints of bilateral eyelid irritation/pruritis and swelling x 5 weeks  Patient was seen at urgent care on 12/26/18 and diagnosed with conjunctivitis and blepharitis, placed on PO MedrolDose pack which helped with swelling but not other symptoms  Patient states she has been waking up with yellow eye discharge from the eyelid and has been applying warm compresses without relief  Patient has been taking OTC allergy medications which assist with symptoms for 1 hour  Patient states she did recently begin a new job and believes that she may be triggered by allergens in the building  Patient states today she felt like her vision in both eyes were blurred after applying warm compress bit this has since improved  Patient does wear glasses but no contacts  Denies pain with EOM, FB sensation, contact lens use, changes in vision, headache, photophobia, fever, chills, chest pain, SOB, sick contacts           History provided by:  Patient  Eye Problem   Location:  Both eyes  Quality:  Stinging  Severity:  Moderate  Duration:  5 weeks  Timing:  Constant  Progression:  Worsening  Chronicity:  Recurrent  Associated symptoms: blurred vision, crusting, discharge, inflammation, itching, redness, swelling and tearing    Associated symptoms: no decreased vision, no double vision, no facial rash, no headaches, no nausea, no numbness, no photophobia, no scotomas, no tingling, no vomiting and no weakness    Discharge:     Quality:  Purulent  Risk factors: no conjunctival hemorrhage, no exposure to pinkeye, no previous injury to eye and no recent herpes zoster        Prior to Admission Medications   Prescriptions Last Dose Informant Patient Reported? Taking?   levonorgestrel (MIRENA) 20 MCG/24HR IUD   Yes Yes   Si each by Intrauterine route once   levothyroxine 75 mcg tablet   No Yes   Sig: Take 1 tablet (75 mcg total) by mouth daily      Facility-Administered Medications: None       Past Medical History:   Diagnosis Date    Hypothyroidism        History reviewed  No pertinent surgical history  Family History   Problem Relation Age of Onset    Depression Mother     Drug abuse Mother     Anxiety disorder Mother         anxiety and depression    Heart disease Mother         dx in 19's    Substance Abuse Father     Diabetes Father     Heart disease Father         dx in 42's    Dementia Maternal Grandmother     Substance Abuse Maternal Grandfather      I have reviewed and agree with the history as documented  Social History   Substance Use Topics    Smoking status: Former Smoker     Packs/day: 0 33     Years: 15 00     Quit date: 2015    Smokeless tobacco: Never Used    Alcohol use Yes      Comment: social        Review of Systems   Constitutional: Negative for appetite change, chills, fever and unexpected weight change  HENT: Negative for congestion, drooling, ear pain, rhinorrhea, sore throat, trouble swallowing and voice change  Eyes: Positive for blurred vision, pain, discharge, redness, itching and visual disturbance  Negative for double vision and photophobia  Respiratory: Negative for cough, shortness of breath, wheezing and stridor  Cardiovascular: Negative for chest pain, palpitations and leg swelling  Gastrointestinal: Negative for abdominal pain, blood in stool, constipation, diarrhea, nausea and vomiting  Genitourinary: Negative for dysuria, flank pain, frequency, hematuria and urgency     Musculoskeletal: Negative for gait problem, joint swelling, neck pain and neck stiffness  Skin: Negative for color change and rash  Neurological: Negative for dizziness, tingling, seizures, weakness, light-headedness, numbness and headaches  Physical Exam  Physical Exam   Constitutional: She is oriented to person, place, and time  Vital signs are normal  She appears well-developed and well-nourished  She is cooperative  Non-toxic appearance  No distress  HENT:   Head: Normocephalic and atraumatic  Nose: Nose normal    Mouth/Throat: Uvula is midline, oropharynx is clear and moist and mucous membranes are normal    Eyes: Pupils are equal, round, and reactive to light  EOM are normal  Lids are everted and swept, no foreign bodies found  Right eye exhibits chemosis and discharge  Left eye exhibits chemosis and discharge  Right conjunctiva is injected  Left conjunctiva is injected  Upper and low eyelids with erythema and edema, flaking skin  No pain with EOM  No proptosis  Mucopurulent discharge noted on the eyelids bilaterally  Neck: Normal range of motion  Neck supple  Cardiovascular: Normal rate, regular rhythm and intact distal pulses  Pulmonary/Chest: Effort normal and breath sounds normal    Musculoskeletal: Normal range of motion  Neurological: She is alert and oriented to person, place, and time  She has normal strength  GCS eye subscore is 4  GCS verbal subscore is 5  GCS motor subscore is 6  Skin: Skin is warm and dry  Capillary refill takes less than 2 seconds  Psychiatric: She has a normal mood and affect  Nursing note and vitals reviewed        Vital Signs  ED Triage Vitals [01/05/19 1941]   Temperature Pulse Respirations Blood Pressure SpO2   98 5 °F (36 9 °C) 74 16 112/72 97 %      Temp Source Heart Rate Source Patient Position - Orthostatic VS BP Location FiO2 (%)   Oral Monitor Lying Right arm --      Pain Score       No Pain           Vitals:    01/05/19 1941   BP: 112/72   Pulse: 74   Patient Position - Orthostatic VS: Lying       Visual Acuity  Visual Acuity      Most Recent Value   Visual acuity R eye is  20/25   Visual acuity Left eye is  20/25   Visual acuity in both eyes is  20/20   L Pupil Size (mm)  4   R Pupil Size (mm)  4   L Pupil Shape  Round   R Pupil Shape  Round          ED Medications  Medications   erythromycin (ILOTYCIN) 0 5 % ophthalmic ointment 0 5 inch (0 5 inches Both Eyes Given 1/5/19 2028)       Diagnostic Studies  Results Reviewed     None                 No orders to display              Procedures  Procedures       Phone Contacts  ED Phone Contact    ED Course  ED Course as of Jan 05 2033   Sat Jan 05, 2019 2000 20/20 Both Eyes  20/25 Right Eye  20/25 Left Eye      2003 Educated patient regarding diagnosis and management  Advised patient to follow up with PCP  Advised patient to RTER for persistent or worsening symptoms  MDM  Number of Diagnoses or Management Options  Allergic conjunctivitis: new and does not require workup  Blepharitis of both eyes: new and does not require workup  Diagnosis management comments: Differential diagnosis included but not limited to: Blepharitis, conjunctivitis, allergic reaction, dacrycystitis, nephrotic syndrome, chalazion, stye    Patient Progress  Patient progress: improved    CritCare Time    Disposition  Final diagnoses:   Blepharitis of both eyes   Allergic conjunctivitis     Time reflects when diagnosis was documented in both MDM as applicable and the Disposition within this note     Time User Action Codes Description Comment    1/5/2019  8:00 PM Kenzie German Add [H01 003,  I80 234] Blepharitis of both eyes     1/5/2019  8:00 PM Kenzie German Add [H10 10] Allergic conjunctivitis       ED Disposition     ED Disposition Condition Comment    Discharge  Mukul Campbell discharge to home/self care      Condition at discharge: Good        Follow-up Information     Follow up With Specialties Details Why Contact Info Additional [address] Emergency Department Emergency Medicine Go to If symptoms worsen [address] [facility] [address] [phone] [facility], [address], [place], [place], [address] [facility] Ophthalmology Schedule an appointment as soon as possible for a visit  [address] [facility]  [phone]             Patient's Medications   Discharge Prescriptions    ERYTHROMYCIN (ILOTYCIN) OPHTHALMIC OINTMENT    Place a 1/2 inch ribbon of ointment into the lower eyelid q4h while awake x 5 days       Start Date: 1/5/2019  End Date: --       Order Dose: --       Quantity: 3 5 g    Refills: 0    OLOPATADINE (PATANOL) 0 1 % OPHTHALMIC SOLUTION    Administer 1 drop to both eyes 2 (two) times a day as needed for allergies       Start Date: 1/5/2019  End Date: --       Order Dose: 1 drop       Quantity: 5 mL    Refills: 0     No discharge procedures on file      ED Provider  Electronically Signed by           Caryle Figures, PA-C  01/05/19 2034

## 2019-01-06 NOTE — DISCHARGE INSTRUCTIONS
Blepharitis   WHAT YOU NEED TO KNOW:   Blepharitis is inflammation of one or both eyelids  Your eyelid, eyelashes, oil glands, or whites of the eye may be affected  DISCHARGE INSTRUCTIONS:   Medicines:   · Medicines  can help decrease pain and swelling, or treat an infection  · Take your medicine as directed  Contact your healthcare provider if you think your medicine is not helping or if you have side effects  Tell him or her if you are allergic to any medicine  Keep a list of the medicines, vitamins, and herbs you take  Include the amounts, and when and why you take them  Bring the list or the pill bottles to follow-up visits  Carry your medicine list with you in case of an emergency  Follow up with your healthcare provider as directed:  Write down your questions so you remember to ask them during your visits  Care for your eyelid:  Always wash your hands with soap and water before and after eye care:  · Use artificial tears  twice a day if you have dry eye  · Apply a warm compress  for 10 minutes once a day to loosen crusts and to decrease itching and burning  · Gently scrub your upper and lower eyelid  with 2 to 3 drops of baby shampoo in ½ cup warm water 2 times a day  This will help open your clogged oil glands and remove pus or other material stuck to your eyelid  · Massage your upper and lower eyelid in small circles for 5 seconds  to loosen oil plugs and to decrease inflammation  · Do not wear contact lenses or eye makeup  until your eye has healed  Contact your healthcare provider if:   · Your vision changes  · Your signs and symptoms get worse, even after treatment  · Your signs and symptoms return  · You have a lump on your eyelid  · You have a pus-filled sore on your eyelid  · You have questions or concerns about your condition or care  Return to the emergency department if:   · You have severe pain  · You have vision loss    © 2017 [facility] Information is for End User's use only and may not be sold, redistributed or otherwise used for commercial purposes  All illustrations and images included in CareNotes® are the copyrighted property of A D A M [employer] Inc  or Giovanni Ibarra  The above information is an  only  It is not intended as medical advice for individual conditions or treatments  Talk to your doctor, nurse or pharmacist before following any medical regimen to see if it is safe and effective for you

## 2019-06-19 ENCOUNTER — TELEPHONE (OUTPATIENT)
Dept: INTERNAL MEDICINE CLINIC | Facility: CLINIC | Age: 44
End: 2019-06-19

## 2019-07-08 ENCOUNTER — APPOINTMENT (OUTPATIENT)
Dept: RADIOLOGY | Age: 44
End: 2019-07-08
Payer: OTHER MISCELLANEOUS

## 2019-07-08 ENCOUNTER — OCCMED (OUTPATIENT)
Dept: URGENT CARE | Age: 44
End: 2019-07-08
Payer: OTHER MISCELLANEOUS

## 2019-07-08 DIAGNOSIS — S39.92XA INJURY OF BACK, INITIAL ENCOUNTER: Primary | ICD-10-CM

## 2019-07-08 DIAGNOSIS — S39.92XA INJURY OF BACK, INITIAL ENCOUNTER: ICD-10-CM

## 2019-07-08 PROCEDURE — 72100 X-RAY EXAM L-S SPINE 2/3 VWS: CPT

## 2019-07-08 PROCEDURE — 99283 EMERGENCY DEPT VISIT LOW MDM: CPT | Performed by: PHYSICIAN ASSISTANT

## 2019-07-08 PROCEDURE — G0382 LEV 3 HOSP TYPE B ED VISIT: HCPCS | Performed by: PHYSICIAN ASSISTANT

## 2019-10-24 ENCOUNTER — APPOINTMENT (EMERGENCY)
Dept: ULTRASOUND IMAGING | Facility: HOSPITAL | Age: 44
End: 2019-10-24
Payer: COMMERCIAL

## 2019-10-24 ENCOUNTER — HOSPITAL ENCOUNTER (EMERGENCY)
Facility: HOSPITAL | Age: 44
Discharge: HOME | End: 2019-10-24
Attending: EMERGENCY MEDICINE | Admitting: EMERGENCY MEDICINE
Payer: COMMERCIAL

## 2019-10-24 VITALS
OXYGEN SATURATION: 100 % | RESPIRATION RATE: 16 BRPM | DIASTOLIC BLOOD PRESSURE: 66 MMHG | HEIGHT: 65 IN | BODY MASS INDEX: 29.16 KG/M2 | SYSTOLIC BLOOD PRESSURE: 126 MMHG | HEART RATE: 75 BPM | TEMPERATURE: 99.5 F | WEIGHT: 175 LBS

## 2019-10-24 DIAGNOSIS — M79.604 RIGHT LEG PAIN: Primary | ICD-10-CM

## 2019-10-24 DIAGNOSIS — T14.8XXA MUSCLE STRAIN: ICD-10-CM

## 2019-10-24 PROCEDURE — 99284 EMERGENCY DEPT VISIT MOD MDM: CPT | Performed by: EMERGENCY MEDICINE

## 2019-10-24 PROCEDURE — 93971 EXTREMITY STUDY: CPT

## 2019-10-24 PROCEDURE — 93971 EXTREMITY STUDY: CPT | Performed by: SURGERY

## 2019-10-24 PROCEDURE — 99283 EMERGENCY DEPT VISIT LOW MDM: CPT

## 2019-10-24 RX ORDER — IBUPROFEN 400 MG/1
800 TABLET ORAL ONCE
Status: COMPLETED | OUTPATIENT
Start: 2019-10-24 | End: 2019-10-24

## 2019-10-24 RX ADMIN — IBUPROFEN 800 MG: 400 TABLET ORAL at 12:30

## 2019-10-24 NOTE — ED PROVIDER NOTES
History  Chief Complaint   Patient presents with    Leg Pain     patient reports having right leg pain 2 nights ago randomly, sudden onset  no known injury or trauma  behind knee is the worst pain  no hx of DVT/PE   patient reports Mirena IUD  Patient presents to the emergency department with right lower extremity discomfort that began yesterday without trauma and states that the pain is mostly in the popliteal fossa of the right lower extremity  Extends down into the care of when she walks  She also feels there swelling  Patient does not have history due to pulmonary embolism  She denies chest pain shortness of breath  Denies hip anterior the ankle or foot pain  Denies fever chills  Prior to Admission Medications   Prescriptions Last Dose Informant Patient Reported? Taking?   erythromycin (ILOTYCIN) ophthalmic ointment Not Taking at Unknown time  No No   Sig: Place a 1/2 inch ribbon of ointment into the lower eyelid q4h while awake x 5 days   Patient not taking: Reported on 10/24/2019   levonorgestrel (MIRENA) 20 MCG/24HR IUD 10/24/2019 at Unknown time  Yes Yes   Si each by Intrauterine route once   levothyroxine 75 mcg tablet Not Taking at Unknown time  No No   Sig: Take 1 tablet (75 mcg total) by mouth daily   Patient not taking: Reported on 10/24/2019   olopatadine (PATANOL) 0 1 % ophthalmic solution Not Taking at Unknown time  No No   Sig: Administer 1 drop to both eyes 2 (two) times a day as needed for allergies   Patient not taking: Reported on 10/24/2019      Facility-Administered Medications: None       Past Medical History:   Diagnosis Date    Hypothyroidism        History reviewed  No pertinent surgical history      Family History   Problem Relation Age of Onset    Depression Mother     Drug abuse Mother     Anxiety disorder Mother         anxiety and depression    Heart disease Mother         dx in 's    Substance Abuse Father     Diabetes Father     Heart disease Father dx in 42's    Dementia Maternal Grandmother     Substance Abuse Maternal Grandfather      I have reviewed and agree with the history as documented  Social History     Tobacco Use    Smoking status: Former Smoker     Packs/day: 0 33     Years: 15 00     Pack years: 4 95     Last attempt to quit: 2015     Years since quittin 6    Smokeless tobacco: Never Used   Substance Use Topics    Alcohol use: Yes     Comment: social    Drug use: No        Review of Systems   Constitutional: Negative  Negative for activity change, appetite change, chills, diaphoresis, fatigue and fever  HENT: Negative  Negative for congestion, drooling, ear discharge, ear pain, facial swelling, rhinorrhea, sinus pressure, sinus pain, sneezing, tinnitus and trouble swallowing  Eyes: Negative  Negative for photophobia and visual disturbance  Respiratory: Negative  Negative for cough, chest tightness, shortness of breath, wheezing and stridor  Cardiovascular: Positive for leg swelling  Negative for chest pain and palpitations  Gastrointestinal: Negative  Negative for abdominal distention, abdominal pain, anal bleeding, blood in stool, diarrhea, nausea and vomiting  Endocrine: Negative  Genitourinary: Negative  Negative for difficulty urinating, dysuria, frequency, hematuria, urgency, vaginal bleeding, vaginal discharge and vaginal pain  Musculoskeletal: Positive for gait problem and myalgias  Negative for neck pain and neck stiffness  Skin: Negative  Negative for rash and wound  Allergic/Immunologic: Negative  Neurological: Negative for dizziness, seizures, syncope, weakness, light-headedness, numbness and headaches  Hematological: Negative  Does not bruise/bleed easily  Psychiatric/Behavioral: Negative  Negative for confusion  Physical Exam  Physical Exam   Constitutional: She is oriented to person, place, and time  She appears well-developed and well-nourished  No distress  HENT:   Head: Normocephalic and atraumatic  Eyes: Pupils are equal, round, and reactive to light  Conjunctivae and EOM are normal    Neck: Normal range of motion  Neck supple  Cardiovascular: Normal rate, regular rhythm, normal heart sounds and intact distal pulses  Pulmonary/Chest: Effort normal and breath sounds normal  No stridor  No respiratory distress  She has no wheezes  She has no rales  She exhibits no tenderness  Abdominal: Soft  Bowel sounds are normal  She exhibits no distension and no mass  There is no tenderness  There is no rebound and no guarding  No hernia  Musculoskeletal: She exhibits tenderness  She exhibits no edema or deformity  Normal appearance of right lower extremity  Moderate tenderness in the popliteal fossa without masses fluctuance or induration or erythema ecchymosis  Decreased range of motion at the knee because of discomfort  No bony deformity  Mild tenderness in the calf with mild swelling  Normal neurovascular status  Normal ankle foot exam    Neurological: She is alert and oriented to person, place, and time  She has normal reflexes  She displays normal reflexes  No cranial nerve deficit or sensory deficit  She exhibits normal muscle tone  Coordination normal    Skin: Skin is warm and dry  No rash noted  She is not diaphoretic  No erythema  No pallor  Psychiatric: She has a normal mood and affect  Her behavior is normal  Judgment and thought content normal    Nursing note and vitals reviewed        Vital Signs  ED Triage Vitals [10/24/19 1055]   Temperature Pulse Respirations Blood Pressure SpO2   99 5 °F (37 5 °C) 75 16 126/66 100 %      Temp Source Heart Rate Source Patient Position - Orthostatic VS BP Location FiO2 (%)   Oral Monitor Sitting Left arm --      Pain Score       7           Vitals:    10/24/19 1055   BP: 126/66   Pulse: 75   Patient Position - Orthostatic VS: Sitting         Visual Acuity      ED Medications  Medications   ibuprofen (MOTRIN) tablet 800 mg (has no administration in time range)       Diagnostic Studies  Results Reviewed     None                 VAS lower limb venous duplex study, unilateral/limited    (Results Pending)              Procedures  Procedures       ED Course  ED Course as of Oct 24 1227   Thu Oct 24, 2019   1213 Patient stable for discharge  Suspect a muscular ligamentous strain  Ultrasound showed DVT  Discussed patient slim possibility an early presentation this DVT ultrasound to retrolisthesis crease pain and swelling  Motrin and crutches given  Patient also given orthopedic referral                                   MDM    Disposition  Final diagnoses:   Right leg pain   Muscle strain     Time reflects when diagnosis was documented in both MDM as applicable and the Disposition within this note     Time User Action Codes Description Comment    10/24/2019 12:14 PM Jenness Page Add [M79 605] Left leg pain     10/24/2019 12:14 PM Jenness Page Add [S64 940] Right leg pain     10/24/2019 12:14 PM Jenness Page Modify [Q37 746] Right leg pain     10/24/2019 12:14 PM Jenness Page Remove [M79 605] Left leg pain     10/24/2019 12:15 PM Benjie, [address]  8XXA] Muscle strain       ED Disposition     ED Disposition Condition Date/Time Comment    Discharge Stable Thu Oct 24, 2019 12:14 PM Norma Weber discharge to home/self care  Follow-up Information     Follow up With Specialties Details Why [address], DO Orthopedic Surgery Schedule an appointment as soon as possible for a visit  Call Ortho for follow up [address] [facility]  [address] [facility] 9[facility] [phone]            Patient's Medications   Discharge Prescriptions    No medications on file     No discharge procedures on file      ED Provider  Electronically Signed by           Grayson Diallo MD  10/24/19 5028

## 2019-10-25 RX ORDER — CYCLOBENZAPRINE HCL 10 MG
10 TABLET ORAL 3 TIMES DAILY
Qty: 30 TABLET | Refills: 0 | Status: SHIPPED | OUTPATIENT
Start: 2019-10-25 | End: 2019-12-08

## 2019-12-08 ENCOUNTER — OFFICE VISIT (OUTPATIENT)
Dept: URGENT CARE | Facility: CLINIC | Age: 44
End: 2019-12-08
Payer: COMMERCIAL

## 2019-12-08 VITALS
HEART RATE: 73 BPM | RESPIRATION RATE: 18 BRPM | WEIGHT: 192 LBS | SYSTOLIC BLOOD PRESSURE: 120 MMHG | HEIGHT: 64 IN | DIASTOLIC BLOOD PRESSURE: 70 MMHG | BODY MASS INDEX: 32.78 KG/M2 | OXYGEN SATURATION: 98 % | TEMPERATURE: 98.2 F

## 2019-12-08 DIAGNOSIS — H10.13 ALLERGIC CONJUNCTIVITIS OF BOTH EYES: ICD-10-CM

## 2019-12-08 DIAGNOSIS — H01.00A BLEPHARITIS OF UPPER AND LOWER EYELIDS OF BOTH EYES, UNSPECIFIED TYPE: Primary | ICD-10-CM

## 2019-12-08 DIAGNOSIS — H01.00B BLEPHARITIS OF UPPER AND LOWER EYELIDS OF BOTH EYES, UNSPECIFIED TYPE: Primary | ICD-10-CM

## 2019-12-08 PROCEDURE — 99213 OFFICE O/P EST LOW 20 MIN: CPT | Performed by: NURSE PRACTITIONER

## 2019-12-08 RX ORDER — ERYTHROMYCIN 5 MG/G
0.5 OINTMENT OPHTHALMIC EVERY 6 HOURS SCHEDULED
Qty: 3.5 G | Refills: 0 | Status: SHIPPED | OUTPATIENT
Start: 2019-12-08 | End: 2019-12-13

## 2019-12-08 RX ORDER — PREDNISONE 20 MG/1
20 TABLET ORAL DAILY
Qty: 5 TABLET | Refills: 0 | Status: SHIPPED | OUTPATIENT
Start: 2019-12-08 | End: 2019-12-13

## 2019-12-08 NOTE — PATIENT INSTRUCTIONS
Erthromycin ophthalmic ointment to both eyes four times daily for 5 days  Prednisone 20mg daily with food for 5 days  Recommend daily allergy eye drops, 1 drop each eye daily  Recommend daily allergy medication such as generic Claritin for next several weeks  Cold compresses     Follow up with PCP in 3-5 days if symptoms persist

## 2019-12-08 NOTE — PROGRESS NOTES
[facility]        NAME: Mike Hernandez is a 45 y o  female  : 1975    MRN: [id number]  DATE: 2019  TIME: 2:04 PM    Assessment and Plan   1  Blepharitis of upper and lower eyelids of both eyes, unspecified type  - erythromycin (ILOTYCIN) ophthalmic ointment; Administer 0 5 inches to both eyes every 6 (six) hours for 5 days  Dispense: 3 5 g; Refill: 0  2  Allergic conjunctivitis of both eyes  - predniSONE 20 mg tablet; Take 1 tablet (20 mg total) by mouth daily for 5 days  Dispense: 5 tablet; Refill: 0      Patient Instructions     Erthromycin ophthalmic ointment to both eyes four times daily for 5 days  Prednisone 20mg daily with food for 5 days  Recommend daily allergy eye drops, 1 drop each eye daily  Recommend daily allergy medication such as generic Claritin for next several weeks  Cold compresses  Follow up with PCP in 3-5 days if symptoms persist        Chief Complaint     Chief Complaint   Patient presents with    Eye Pain     swollen eye lids for 2 days, itchy         History of Present Illness       Symptoms for 2 days  Swelling around both eyes  No discharge  No cold symptom  Vision seems blurred due to swelling  Itchy  Not painful  Has been applying topical antibiotic ointment, saline washes  Warm compresses  Had exact same symptoms one year ago  Treated with eye medication and steroids  Review of Systems   Review of Systems   Constitutional: Negative for fever  HENT: Negative for congestion  Eyes: Positive for redness and itching  Redness around both eyes  Itchy  Respiratory: Negative for cough and shortness of breath  Skin: Positive for rash (around eyes)  Neurological: Negative for dizziness and headaches           Current Medications       Current Outpatient Medications:     erythromycin (ILOTYCIN) ophthalmic ointment, Administer 0 5 inches to both eyes every 6 (six) hours for 5 days, Disp: 3 5 g, Rfl: 0    levonorgestrel (MIRENA) 20 MCG/24HR IUD, 1 each by Intrauterine route once, Disp: , Rfl:     predniSONE 20 mg tablet, Take 1 tablet (20 mg total) by mouth daily for 5 days, Disp: 5 tablet, Rfl: 0    Current Allergies     Allergies as of 12/08/2019    (No Known Allergies)            The following portions of the patient's history were reviewed and updated as appropriate: allergies, current medications, past family history, past medical history, past social history, past surgical history and problem list      Past Medical History:   Diagnosis Date    Hypothyroidism        History reviewed  No pertinent surgical history  Family History   Problem Relation Age of Onset    Depression Mother     Drug abuse Mother     Anxiety disorder Mother         anxiety and depression    Heart disease Mother         dx in 19's    Substance Abuse Father     Diabetes Father     Heart disease Father         dx in 42's    Dementia Maternal Grandmother     Substance Abuse Maternal Grandfather          Medications have been verified  Objective   /70   Pulse 73   Temp 98 2 °F (36 8 °C)   Resp 18   Ht 5' 4" (1 626 m) Comment: pt reported  Wt 87 1 kg (192 lb)   SpO2 98%   BMI 32 96 kg/m²        Physical Exam     Physical Exam   Constitutional: She is oriented to person, place, and time  She appears well-developed and well-nourished  Eyes: Pupils are equal, round, and reactive to light  EOM are normal    Both eyelids erythematous and edematous  Lashae-orbital erythema  Conjunctiva and sclera mildly injected  Cardiovascular: Normal rate  Pulmonary/Chest: Effort normal and breath sounds normal    Lymphadenopathy:     She has no cervical adenopathy  Neurological: She is alert and oriented to person, place, and time

## 2019-12-13 ENCOUNTER — TELEPHONE (OUTPATIENT)
Dept: INTERNAL MEDICINE CLINIC | Facility: CLINIC | Age: 44
End: 2019-12-13

## 2020-01-14 ENCOUNTER — OFFICE VISIT (OUTPATIENT)
Dept: URGENT CARE | Facility: CLINIC | Age: 45
End: 2020-01-14
Payer: COMMERCIAL

## 2020-01-14 VITALS
SYSTOLIC BLOOD PRESSURE: 117 MMHG | DIASTOLIC BLOOD PRESSURE: 75 MMHG | TEMPERATURE: 98.1 F | HEART RATE: 64 BPM | BODY MASS INDEX: 33.29 KG/M2 | RESPIRATION RATE: 16 BRPM | HEIGHT: 64 IN | WEIGHT: 195 LBS

## 2020-01-14 DIAGNOSIS — H01.00A BLEPHARITIS OF UPPER AND LOWER EYELIDS OF BOTH EYES, UNSPECIFIED TYPE: Primary | ICD-10-CM

## 2020-01-14 DIAGNOSIS — H01.00B BLEPHARITIS OF UPPER AND LOWER EYELIDS OF BOTH EYES, UNSPECIFIED TYPE: Primary | ICD-10-CM

## 2020-01-14 PROCEDURE — 99213 OFFICE O/P EST LOW 20 MIN: CPT | Performed by: PHYSICIAN ASSISTANT

## 2020-01-14 RX ORDER — LORATADINE 10 MG/1
10 TABLET ORAL DAILY
COMMUNITY
End: 2020-03-20

## 2020-01-14 RX ORDER — ERYTHROMYCIN 5 MG/G
OINTMENT OPHTHALMIC
Qty: 3.5 G | Refills: 0 | Status: SHIPPED | OUTPATIENT
Start: 2020-01-14 | End: 2020-01-26

## 2020-01-14 RX ORDER — PREDNISONE 50 MG/1
50 TABLET ORAL DAILY
Qty: 5 TABLET | Refills: 0 | Status: SHIPPED | OUTPATIENT
Start: 2020-01-14 | End: 2020-01-19

## 2020-01-14 NOTE — PROGRESS NOTES
[facility]        NAME: Carlos Enrique Worrell is a 45 y o  female  : 1975    MRN: [id number]  DATE: 2020  TIME: 1:58 PM    Assessment and Plan   Blepharitis of upper and lower eyelids of both eyes, unspecified type [H01 00A, H01 00B]  1  Blepharitis of upper and lower eyelids of both eyes, unspecified type  erythromycin (ILOTYCIN) ophthalmic ointment    predniSONE 50 mg tablet    Ambulatory referral to Dermatology         Patient Instructions   Prescriptions sent to the pharmacy for erythromycin ophthalmic ointment and prednisone-use as directed  Continue antihistamine  Do not put any types of creams or makeup on the eye  Referral provided for a dermatologist     Proceed to  ER if symptoms worsen  Chief Complaint     Chief Complaint   Patient presents with    Eye Problem     was seen here prviously with blepheritis  Given erytho ointment claritin  Prdenisone and allergy eye drops  Problem resolved then a few days alater started again states eyes are very swollen with discharge every am            History of Present Illness   The patient is a 19-year-old female who presents with recurrent blepharitis  She states that last month she developed redness and swelling of her bilateral upper and lower eyelids  She was started on oral prednisone, erythromycin ointment and advised to take an oral antihistamine along with antihistamine eyedrops  She states that while on the medication the symptoms did resolve, however, returned earlier this month  She denies the use of any creams or ointments on her eye  She does not wear eye makeup  The skin changes are not present on any other part of her body  This morning she awoke with her eyes crusted shut and some drainage from the eye  She denies any vision changes  Negative photophobia  HPI    Review of Systems   Review of Systems   Constitutional: Negative for chills and fever  HENT: Negative  Negative for sinus pressure      Eyes: Positive for discharge and itching  Negative for photophobia, pain, redness and visual disturbance  Upper and lower eyelid redness and swelling  All other systems reviewed and are negative  Current Medications       Current Outpatient Medications:     levonorgestrel (MIRENA) 20 MCG/24HR IUD, 1 each by Intrauterine route once, Disp: , Rfl:     loratadine (CLARITIN) 10 mg tablet, Take 10 mg by mouth daily, Disp: , Rfl:     erythromycin (ILOTYCIN) ophthalmic ointment, Every 2-4 hours while awake x 7-10 days, Disp: 3 5 g, Rfl: 0    predniSONE 50 mg tablet, Take 1 tablet (50 mg total) by mouth daily for 5 days, Disp: 5 tablet, Rfl: 0    Current Allergies     Allergies as of 01/14/2020    (No Known Allergies)            The following portions of the patient's history were reviewed and updated as appropriate: allergies, current medications, past family history, past medical history, past social history, past surgical history and problem list      Past Medical History:   Diagnosis Date    Hypothyroidism        No past surgical history on file  Family History   Problem Relation Age of Onset    Depression Mother     Drug abuse Mother     Anxiety disorder Mother         anxiety and depression    Heart disease Mother         dx in 19's    Substance Abuse Father     Diabetes Father     Heart disease Father         dx in 42's    Dementia Maternal Grandmother     Substance Abuse Maternal Grandfather          Medications have been verified  Objective   /75 (Patient Position: Sitting)   Pulse 64   Temp 98 1 °F (36 7 °C)   Resp 16   Ht 5' 4" (1 626 m)   Wt 88 5 kg (195 lb)   BMI 33 47 kg/m²        Physical Exam     Physical Exam   Constitutional: She appears well-developed and well-nourished  No distress  HENT:   Head: Normocephalic     Right Ear: External ear normal    Left Ear: External ear normal    Nose: Nose normal    Mouth/Throat: Oropharynx is clear and moist  No oropharyngeal exudate  Eyes: Pupils are equal, round, and reactive to light  Conjunctivae and EOM are normal  Right eye exhibits no chemosis, no discharge, no exudate and no hordeolum  No foreign body present in the right eye  Left eye exhibits no chemosis, no discharge, no exudate and no hordeolum  No foreign body present in the left eye  Right conjunctiva is not injected  Right conjunctiva has no hemorrhage  Left conjunctiva is not injected  Left conjunctiva has no hemorrhage  No scleral icterus  Right eye exhibits normal extraocular motion and no nystagmus  Left eye exhibits normal extraocular motion and no nystagmus  Right pupil is round and reactive  Left pupil is round and reactive  Pupils are equal    There is extensive erythema and edema of the bilateral upper and lower eyelid and periorbital region  No drainage visualized  Negative conjunctivitis  The area is urticarial    Skin: She is not diaphoretic  Nursing note and vitals reviewed

## 2020-01-14 NOTE — PATIENT INSTRUCTIONS
Prescriptions sent to the pharmacy for erythromycin ophthalmic ointment and prednisone-use as directed  Continue antihistamine  Do not put any types of creams or makeup on the eye  Referral provided for a dermatologist     Proceed to  ER if symptoms worsen  Blepharitis   WHAT YOU NEED TO KNOW:   Blepharitis is inflammation of one or both eyelids  Your eyelid, eyelashes, oil glands, or whites of the eye may be affected  DISCHARGE INSTRUCTIONS:   Medicines:   · Medicines  can help decrease pain and swelling, or treat an infection  · Take your medicine as directed  Contact your healthcare provider if you think your medicine is not helping or if you have side effects  Tell him or her if you are allergic to any medicine  Keep a list of the medicines, vitamins, and herbs you take  Include the amounts, and when and why you take them  Bring the list or the pill bottles to follow-up visits  Carry your medicine list with you in case of an emergency  Follow up with your healthcare provider as directed:  Write down your questions so you remember to ask them during your visits  Care for your eyelid:  Always wash your hands with soap and water before and after eye care:  · Use artificial tears  twice a day if you have dry eye  · Apply a warm compress  for 10 minutes once a day to loosen crusts and to decrease itching and burning  · Gently scrub your upper and lower eyelid  with 2 to 3 drops of baby shampoo in ½ cup warm water 2 times a day  This will help open your clogged oil glands and remove pus or other material stuck to your eyelid  · Massage your upper and lower eyelid in small circles for 5 seconds  to loosen oil plugs and to decrease inflammation  · Do not wear contact lenses or eye makeup  until your eye has healed  Contact your healthcare provider if:   · Your vision changes  · Your signs and symptoms get worse, even after treatment  · Your signs and symptoms return      · You have a lump on your eyelid  · You have a pus-filled sore on your eyelid  · You have questions or concerns about your condition or care  Return to the emergency department if:   · You have severe pain  · You have vision loss  © 2017 [address] [employer] Information is for End User's use only and may not be sold, redistributed or otherwise used for commercial purposes  All illustrations and images included in CareNotes® are the copyrighted property of [employer]  or Giovanni Ibarra  The above information is an  only  It is not intended as medical advice for individual conditions or treatments  Talk to your doctor, nurse or pharmacist before following any medical regimen to see if it is safe and effective for you

## 2020-01-22 ENCOUNTER — OFFICE VISIT (OUTPATIENT)
Dept: FAMILY MEDICINE CLINIC | Facility: CLINIC | Age: 45
End: 2020-01-22
Payer: COMMERCIAL

## 2020-01-22 VITALS
DIASTOLIC BLOOD PRESSURE: 78 MMHG | SYSTOLIC BLOOD PRESSURE: 122 MMHG | BODY MASS INDEX: 33.8 KG/M2 | HEIGHT: 64 IN | OXYGEN SATURATION: 99 % | WEIGHT: 198 LBS | HEART RATE: 72 BPM

## 2020-01-22 DIAGNOSIS — Z28.21 TETANUS, DIPHTHERIA, AND ACELLULAR PERTUSSIS (TDAP) VACCINATION DECLINED: ICD-10-CM

## 2020-01-22 DIAGNOSIS — Z00.00 ANNUAL PHYSICAL EXAM: ICD-10-CM

## 2020-01-22 DIAGNOSIS — H01.00B BLEPHARITIS OF UPPER AND LOWER EYELIDS OF BOTH EYES, UNSPECIFIED TYPE: ICD-10-CM

## 2020-01-22 DIAGNOSIS — Z12.31 ENCOUNTER FOR SCREENING MAMMOGRAM FOR BREAST CANCER: ICD-10-CM

## 2020-01-22 DIAGNOSIS — Z13.220 SCREENING CHOLESTEROL LEVEL: ICD-10-CM

## 2020-01-22 DIAGNOSIS — E03.9 ADULT HYPOTHYROIDISM: ICD-10-CM

## 2020-01-22 DIAGNOSIS — H01.00A BLEPHARITIS OF UPPER AND LOWER EYELIDS OF BOTH EYES, UNSPECIFIED TYPE: ICD-10-CM

## 2020-01-22 DIAGNOSIS — Z12.4 CERVICAL CANCER SCREENING: ICD-10-CM

## 2020-01-22 DIAGNOSIS — Z13.1 SCREENING FOR DIABETES MELLITUS: ICD-10-CM

## 2020-01-22 DIAGNOSIS — Z28.21 INFLUENZA VACCINATION DECLINED BY PATIENT: ICD-10-CM

## 2020-01-22 DIAGNOSIS — Z76.89 ENCOUNTER TO ESTABLISH CARE: Primary | ICD-10-CM

## 2020-01-22 DIAGNOSIS — Z87.891 FORMER SMOKER: ICD-10-CM

## 2020-01-22 PROCEDURE — 99204 OFFICE O/P NEW MOD 45 MIN: CPT | Performed by: FAMILY MEDICINE

## 2020-01-22 PROCEDURE — 99386 PREV VISIT NEW AGE 40-64: CPT | Performed by: FAMILY MEDICINE

## 2020-01-22 RX ORDER — AZITHROMYCIN 250 MG/1
TABLET, FILM COATED ORAL
Qty: 6 TABLET | Refills: 0 | Status: SHIPPED | OUTPATIENT
Start: 2020-01-22 | End: 2020-01-26

## 2020-01-22 NOTE — PROGRESS NOTES
Assessment/Plan:   Diagnoses and all orders for this visit:    Encounter to establish care  Adult hypothyroidism  -     TSH, 3rd generation with Free T4 reflex  - h/o of hypothyroidism - no recent labs  - advised to get bloodwork done and RTO in 2wks for f/u - pt aware and agreeable     Blepharitis of upper and lower eyelids of both eyes, unspecified type  -     CBC and differential; Future  -     Vitamin D 25 hydroxy; Future  -     Ambulatory referral to Dermatology; Future  -     azithromycin (ZITHROMAX) 250 mg tablet; Take 2 tablets (500 mg total) by mouth daily for 1 day, THEN 1 tablet (250 mg total) daily for 4 days  - unclear etiology   - reviewed ETC and UC notes x2   - s/p 2 rounds of steroid bursts and erythromycin ointment   - currently has been using Claritin, Benadryl (both of which make her drowsy - advised to take both at bedtime), and Vit E oil for dry skin around eyes  - advised to get a records release from her comprehensive Optho w/u last year   - for now - advised warm compresses, lid massage/washing, artificial tears and Zpak for chronic moderate/severe case that has persisted despite topical abx therapy     Other orders  -     Cancel: TDAP VACCINE GREATER THAN OR EQUAL TO 8YO IM  -     Cancel: influenza vaccine, [id number], quadrivalent, 0 5 mL, preservative-free, for adult and pediatric patients 6 mos+ (AFLURIA, FLUARIX, FLULAVAL, FLUZONE)        Subjective:    Patient ID: Roro Davidson is a 45 y o  female    Roro Davidson is a 45 y o  female who presents to the office to establish care and for ETC/UC f/u   1) Establish Care   - prior PCP: IM (Dr Holly Peters) - last OV was ~1yr ago   - PMHx: hypothyroidism, GERD, obesity (BMI 29)   - allergies: NKDA  - Meds: Mirena, Claritin, Vit E oil   - social: former smoker (quit in 2015 - 1/3PP/15yrs), denies vaping/illicits, rare EtOH   2) Blepharitis of upper and lower eyelids of both eyes  - used to wear make-up - but threw it all away   - no new moisturizers, face-wash, no changes to her usual routine   - no new foods, no travel   - has had this happen for the past 2 varma   - no contacts  - s/p 2 rounds of Steroids - 50mg x5days   - has been using erythromycin ointments - s/p 2 rounds  - currently using Claritin 10mg QD and Vit E oil for dry skin   - has to ice her eyes daily to decrease swelling/puffiness - not erythematous in the office today  - follows a low salt diet   - did have a full Optho w/u last year - non-conclusive   - (+) dry eyes and skin around eyes  - ROS: today in the office pt denies F/C/N/V/HA/visual changes/blurry vision/CP/palpitations/SOB/wheezing/abd pain/D/cold intolerance/thinning hair/brittle nails/dry skin/LE edema or calf tenderness    The following portions of the patient's history were reviewed and updated as appropriate: allergies, current medications, past family history, past medical history, past social history, past surgical history and problem list     Review of Systems  as per HPI    Objective:  /78 (BP Location: Left arm, Patient Position: Sitting, Cuff Size: Standard)   Pulse 72   Ht 5' 4" (1 626 m)   Wt 89 8 kg (198 lb)   SpO2 99%   BMI 33 99 kg/m²    Physical Exam   Constitutional: She is oriented to person, place, and time  She appears well-developed and well-nourished  No distress  HENT:   Head: Normocephalic and atraumatic  Right Ear: Tympanic membrane, external ear and ear canal normal  No drainage, swelling or tenderness  No middle ear effusion  Left Ear: Tympanic membrane, external ear and ear canal normal  No drainage, swelling or tenderness  No middle ear effusion  Nose: Nose normal  Right sinus exhibits no maxillary sinus tenderness and no frontal sinus tenderness  Left sinus exhibits no maxillary sinus tenderness and no frontal sinus tenderness  Mouth/Throat: Uvula is midline, oropharynx is clear and moist and mucous membranes are normal  No uvula swelling   No oropharyngeal exudate, posterior oropharyngeal edema or posterior oropharyngeal erythema  Eyes: Conjunctivae and EOM are normal  Right eye exhibits no discharge  Left eye exhibits no discharge  No scleral icterus    (+) Upper and lower eyelid redness and swelling (R>L), (+) itching    Neck: Normal range of motion  Neck supple  No tracheal deviation present  Cardiovascular: Normal rate, regular rhythm and normal heart sounds  Exam reveals no gallop and no friction rub  No murmur heard  Pulmonary/Chest: Effort normal and breath sounds normal  No stridor  No respiratory distress  She has no wheezes  She has no rales  Abdominal: Soft  Bowel sounds are normal    BMI 34   Musculoskeletal: Normal range of motion  She exhibits no edema, tenderness or deformity  Lymphadenopathy:     She has no cervical adenopathy  Neurological: She is alert and oriented to person, place, and time  Skin: Skin is warm and dry  She is not diaphoretic  Psychiatric: She has a normal mood and affect  Her behavior is normal  Judgment and thought content normal    Vitals reviewed  BMI Counseling: Body mass index is 33 99 kg/m²  The BMI is above normal  Nutrition recommendations include reducing portion sizes, decreasing overall calorie intake, 3-5 servings of fruits/vegetables daily, reducing fast food intake, consuming healthier snacks and decreasing soda and/or juice intake  Exercise recommendations include exercising 3-5 times per week

## 2020-01-22 NOTE — PATIENT INSTRUCTIONS

## 2020-01-22 NOTE — PROGRESS NOTES
Assessment/Plan:   Diagnoses and all orders for this visit:    Encounter to establish care  Annual physical exam  - reviewed PMHx, PSHx, meds, allergies, FHx, Soc Hx, Sexual Hx  - declined Tdap and Flu vaccine in the office today   - no recent labs - script given - see below  - does not have a GYN and overdue for annual exam/PAP - referred to [employer]   - overdue for [employer] - script given  - sexually active with spouse, has an IUD and declined STD screening in the office today   - last routine Optho within the past year  - overdue for dental visit   Former smoker  Influenza vaccination declined by patient  Tetanus, diphtheria, and acellular pertussis (Tdap) vaccination declined    Adult hypothyroidism  -     TSH, 3rd generation with Free T4 reflex  - h/o of hypothyroidism - no recent labs  - advised to get bloodwork done and RTO in 2wks for f/u - pt aware and agreeable     Cervical cancer screening  -     Ambulatory referral to Gynecology; Future    Screening cholesterol level  -     Lipid panel    Screening for diabetes mellitus  -     Comprehensive metabolic panel; Future    Encounter for screening mammogram for breast cancer  -     Mammo screening bilateral w 3d & cad; Future    Blepharitis of upper and lower eyelids of both eyes, unspecified type  -     CBC and differential; Future  -     Vitamin D 25 hydroxy; Future  -     Ambulatory referral to Dermatology; Future  -     azithromycin (ZITHROMAX) 250 mg tablet;  Take 2 tablets (500 mg total) by mouth daily for 1 day, THEN 1 tablet (250 mg total) daily for 4 days  - unclear etiology   - reviewed ETC and UC notes x2   - s/p 2 rounds of steroid bursts and erythromycin ointment   - currently has been using Claritin, Benadryl (both of which make her drowsy - advised to take both at bedtime), and Vit E oil for dry skin around eyes  - advised to get a records release from her comprehensive Optho w/u last year   - for now - advised warm compresses, lid massage/washing, artificial tears and Zpak for chronic moderate/severe case that has persisted despite topical abx therapy     Other orders  -     Cancel: TDAP VACCINE GREATER THAN OR EQUAL TO 8YO IM  -     Cancel: influenza vaccine, [id number], quadrivalent, 0 5 mL, preservative-free, for adult and pediatric patients 6 mos+ (AFLURIA, FLUARIX, FLULAVAL, FLUZONE)        Subjective:    Patient ID: Marylee Millet is a 45 y o  female    Marylee Millet is a 45 y o  female who presents to the office to establish care and for an annual exam  1) Establish Care/Annual   - prior PCP: IM (Dr Nikki Zamora) - last OV was ~1yr ago   - PMHx: hypothyroidism, GERD, obesity (BMI 29)   - allergies: NKDA  - Meds: Mirena, Claritin, Vit E oil   - PSHx: none   - FHx: M (substance abuse, hyperthyroid), F (EtOH abuse, HD), Son (allergies), denies FHx of breast/cervical/colon/ovarian/uterine ca   - Immunizations: declined Tdap and Flu vaccine in the office today   - GYN Hx: does not currently have an Ob/Gyn, has a Mirena, irregular spotting  - Hm: last PAP was in 2017, last Mammo 6/2017, denies h/o abnml PAPs/Mammo   - diet/exercise: "I need to exercise more", diet: "has been a lot better in the past year"   - social: former smoker (quit in 2015 - 1/3PP/15yrs), denies vaping/illicits, rare EtOH   - sexual Hx: active with , has an IUD, declined STD screening   - last vision: [facility] - last OV was 6/2019  - last dental: >3yrs   2) Blepharitis of upper and lower eyelids of both eyes  - used to wear make-up - but threw it all away   - no new moisturizers, face-wash, no changes to her usual routine   - no new foods, no travel   - has had this happen for the past 2 varma   - no contacts  - s/p 2 rounds of Steroids - 50mg x5days   - has been using erythromycin ointments - s/p 2 rounds  - currently using Claritin 10mg QD and Vit E oil for dry skin   - has to ice her eyes daily to decrease swelling/puffiness - not erythematous in the office today  - follows a low salt diet   - did have a full Optho w/u last year - non-conclusive   - (+) dry eyes and skin around eyes  - ROS: today in the office pt denies F/C/N/V/HA/visual changes/blurry vision/CP/palpitations/SOB/wheezing/abd pain/D/cold intolerance/thinning hair/brittle nails/dry skin/LE edema or calf tenderness      The following portions of the patient's history were reviewed and updated as appropriate: allergies, current medications, past family history, past medical history, past social history, past surgical history and problem list     Review of Systems  as per HPI    Objective:  /78 (BP Location: Left arm, Patient Position: Sitting, Cuff Size: Standard)   Pulse 72   Ht 5' 4" (1 626 m)   Wt 89 8 kg (198 lb)   SpO2 99%   BMI 33 99 kg/m²    Physical Exam   Constitutional: She is oriented to person, place, and time  She appears well-developed and well-nourished  No distress  HENT:   Head: Normocephalic and atraumatic  Right Ear: Tympanic membrane, external ear and ear canal normal  No drainage, swelling or tenderness  No middle ear effusion  Left Ear: Tympanic membrane, external ear and ear canal normal  No drainage, swelling or tenderness  No middle ear effusion  Nose: Nose normal  Right sinus exhibits no maxillary sinus tenderness and no frontal sinus tenderness  Left sinus exhibits no maxillary sinus tenderness and no frontal sinus tenderness  Mouth/Throat: Uvula is midline, oropharynx is clear and moist and mucous membranes are normal  No uvula swelling  No oropharyngeal exudate, posterior oropharyngeal edema or posterior oropharyngeal erythema  Eyes: Conjunctivae and EOM are normal  Right eye exhibits no discharge  Left eye exhibits no discharge  No scleral icterus    (+) Upper and lower eyelid redness and swelling (R>L), (+) itching    Neck: Normal range of motion  Neck supple  No tracheal deviation present     Cardiovascular: Normal rate, regular rhythm and normal heart sounds  Exam reveals no gallop and no friction rub  No murmur heard  Pulmonary/Chest: Effort normal and breath sounds normal  No stridor  No respiratory distress  She has no wheezes  She has no rales  Abdominal: Soft  Bowel sounds are normal    BMI 34   Musculoskeletal: Normal range of motion  She exhibits no edema, tenderness or deformity  Lymphadenopathy:     She has no cervical adenopathy  Neurological: She is alert and oriented to person, place, and time  Skin: Skin is warm and dry  She is not diaphoretic  Psychiatric: She has a normal mood and affect  Her behavior is normal  Judgment and thought content normal    Vitals reviewed  BMI Counseling: Body mass index is 33 99 kg/m²  The BMI is above normal  Nutrition recommendations include reducing portion sizes, decreasing overall calorie intake, 3-5 servings of fruits/vegetables daily, reducing fast food intake, consuming healthier snacks and decreasing soda and/or juice intake  Exercise recommendations include exercising 3-5 times per week

## 2020-01-24 ENCOUNTER — APPOINTMENT (OUTPATIENT)
Dept: LAB | Facility: CLINIC | Age: 45
End: 2020-01-24
Payer: COMMERCIAL

## 2020-01-24 DIAGNOSIS — Z13.1 SCREENING FOR DIABETES MELLITUS: ICD-10-CM

## 2020-01-24 DIAGNOSIS — H01.00B BLEPHARITIS OF UPPER AND LOWER EYELIDS OF BOTH EYES, UNSPECIFIED TYPE: ICD-10-CM

## 2020-01-24 DIAGNOSIS — H01.00A BLEPHARITIS OF UPPER AND LOWER EYELIDS OF BOTH EYES, UNSPECIFIED TYPE: ICD-10-CM

## 2020-01-24 LAB
25(OH)D3 SERPL-MCNC: 14.4 NG/ML (ref 30–100)
ALBUMIN SERPL BCP-MCNC: 3.6 G/DL (ref 3.5–5)
ALP SERPL-CCNC: 53 U/L (ref 46–116)
ALT SERPL W P-5'-P-CCNC: 27 U/L (ref 12–78)
ANION GAP SERPL CALCULATED.3IONS-SCNC: 6 MMOL/L (ref 4–13)
AST SERPL W P-5'-P-CCNC: 13 U/L (ref 5–45)
BASOPHILS # BLD AUTO: 0.06 THOUSANDS/ΜL (ref 0–0.1)
BASOPHILS NFR BLD AUTO: 1 % (ref 0–1)
BILIRUB SERPL-MCNC: 0.65 MG/DL (ref 0.2–1)
BUN SERPL-MCNC: 19 MG/DL (ref 5–25)
CALCIUM SERPL-MCNC: 8.7 MG/DL (ref 8.3–10.1)
CHLORIDE SERPL-SCNC: 108 MMOL/L (ref 100–108)
CHOLEST SERPL-MCNC: 190 MG/DL (ref 50–200)
CO2 SERPL-SCNC: 26 MMOL/L (ref 21–32)
CREAT SERPL-MCNC: 0.95 MG/DL (ref 0.6–1.3)
EOSINOPHIL # BLD AUTO: 0.19 THOUSAND/ΜL (ref 0–0.61)
EOSINOPHIL NFR BLD AUTO: 3 % (ref 0–6)
ERYTHROCYTE [DISTWIDTH] IN BLOOD BY AUTOMATED COUNT: 14.2 % (ref 11.6–15.1)
GFR SERPL CREATININE-BSD FRML MDRD: 74 ML/MIN/1.73SQ M
GLUCOSE P FAST SERPL-MCNC: 86 MG/DL (ref 65–99)
HCT VFR BLD AUTO: 40.4 % (ref 34.8–46.1)
HDLC SERPL-MCNC: 54 MG/DL
HGB BLD-MCNC: 12.8 G/DL (ref 11.5–15.4)
IMM GRANULOCYTES # BLD AUTO: 0.07 THOUSAND/UL (ref 0–0.2)
IMM GRANULOCYTES NFR BLD AUTO: 1 % (ref 0–2)
LDLC SERPL CALC-MCNC: 126 MG/DL (ref 0–100)
LYMPHOCYTES # BLD AUTO: 1.73 THOUSANDS/ΜL (ref 0.6–4.47)
LYMPHOCYTES NFR BLD AUTO: 25 % (ref 14–44)
MCH RBC QN AUTO: 28.2 PG (ref 26.8–34.3)
MCHC RBC AUTO-ENTMCNC: 31.7 G/DL (ref 31.4–37.4)
MCV RBC AUTO: 89 FL (ref 82–98)
MONOCYTES # BLD AUTO: 0.78 THOUSAND/ΜL (ref 0.17–1.22)
MONOCYTES NFR BLD AUTO: 11 % (ref 4–12)
NEUTROPHILS # BLD AUTO: 4.17 THOUSANDS/ΜL (ref 1.85–7.62)
NEUTS SEG NFR BLD AUTO: 59 % (ref 43–75)
NONHDLC SERPL-MCNC: 136 MG/DL
NRBC BLD AUTO-RTO: 0 /100 WBCS
PLATELET # BLD AUTO: 298 THOUSANDS/UL (ref 149–390)
PMV BLD AUTO: 9.5 FL (ref 8.9–12.7)
POTASSIUM SERPL-SCNC: 4.2 MMOL/L (ref 3.5–5.3)
PROT SERPL-MCNC: 7.3 G/DL (ref 6.4–8.2)
RBC # BLD AUTO: 4.54 MILLION/UL (ref 3.81–5.12)
SODIUM SERPL-SCNC: 140 MMOL/L (ref 136–145)
T4 FREE SERPL-MCNC: 0.6 NG/DL (ref 0.76–1.46)
TRIGL SERPL-MCNC: 49 MG/DL
TSH SERPL DL<=0.05 MIU/L-ACNC: 19.6 UIU/ML (ref 0.36–3.74)
WBC # BLD AUTO: 7 THOUSAND/UL (ref 4.31–10.16)

## 2020-01-24 PROCEDURE — 85025 COMPLETE CBC W/AUTO DIFF WBC: CPT

## 2020-01-24 PROCEDURE — 80053 COMPREHEN METABOLIC PANEL: CPT

## 2020-01-24 PROCEDURE — 80061 LIPID PANEL: CPT | Performed by: FAMILY MEDICINE

## 2020-01-24 PROCEDURE — 36415 COLL VENOUS BLD VENIPUNCTURE: CPT

## 2020-01-24 PROCEDURE — 82306 VITAMIN D 25 HYDROXY: CPT

## 2020-01-24 PROCEDURE — 84443 ASSAY THYROID STIM HORMONE: CPT | Performed by: FAMILY MEDICINE

## 2020-01-24 PROCEDURE — 84439 ASSAY OF FREE THYROXINE: CPT | Performed by: FAMILY MEDICINE

## 2020-01-26 ENCOUNTER — OFFICE VISIT (OUTPATIENT)
Dept: URGENT CARE | Facility: CLINIC | Age: 45
End: 2020-01-26
Payer: COMMERCIAL

## 2020-01-26 ENCOUNTER — NURSE TRIAGE (OUTPATIENT)
Dept: OTHER | Facility: OTHER | Age: 45
End: 2020-01-26

## 2020-01-26 VITALS
DIASTOLIC BLOOD PRESSURE: 84 MMHG | HEART RATE: 75 BPM | OXYGEN SATURATION: 98 % | BODY MASS INDEX: 33.8 KG/M2 | WEIGHT: 198 LBS | SYSTOLIC BLOOD PRESSURE: 122 MMHG | RESPIRATION RATE: 16 BRPM | TEMPERATURE: 97.5 F | HEIGHT: 64 IN

## 2020-01-26 DIAGNOSIS — H01.00B BLEPHARITIS OF UPPER AND LOWER EYELIDS OF BOTH EYES, UNSPECIFIED TYPE: Primary | ICD-10-CM

## 2020-01-26 DIAGNOSIS — H01.00A BLEPHARITIS OF UPPER AND LOWER EYELIDS OF BOTH EYES, UNSPECIFIED TYPE: Primary | ICD-10-CM

## 2020-01-26 PROCEDURE — 99213 OFFICE O/P EST LOW 20 MIN: CPT | Performed by: NURSE PRACTITIONER

## 2020-01-26 RX ORDER — OLOPATADINE HYDROCHLORIDE 1 MG/ML
1 SOLUTION/ DROPS OPHTHALMIC 2 TIMES DAILY
Qty: 5 ML | Refills: 0 | Status: SHIPPED | OUTPATIENT
Start: 2020-01-26 | End: 2020-03-20

## 2020-01-26 RX ORDER — ERYTHROMYCIN 5 MG/G
0.5 OINTMENT OPHTHALMIC EVERY 6 HOURS SCHEDULED
Qty: 3.5 G | Refills: 0 | Status: SHIPPED | OUTPATIENT
Start: 2020-01-26 | End: 2020-03-20

## 2020-01-26 NOTE — TELEPHONE ENCOUNTER
Regarding: swollen eyes   ----- Message from eLlo Whatley sent at 1/26/2020  9:30 AM EST -----  " My eyes are swollen, I can hardly open my eyes  "

## 2020-01-26 NOTE — PATIENT INSTRUCTIONS
Patanol drops as directed  Erythromycin ointment as directed  Follow up with your PCP    Blepharitis   WHAT YOU NEED TO KNOW:   Blepharitis is inflammation of one or both eyelids  Your eyelid, eyelashes, oil glands, or whites of the eye may be affected  DISCHARGE INSTRUCTIONS:   Medicines:   · Medicines  can help decrease pain and swelling, or treat an infection  · Take your medicine as directed  Contact your healthcare provider if you think your medicine is not helping or if you have side effects  Tell him or her if you are allergic to any medicine  Keep a list of the medicines, vitamins, and herbs you take  Include the amounts, and when and why you take them  Bring the list or the pill bottles to follow-up visits  Carry your medicine list with you in case of an emergency  Follow up with your healthcare provider as directed:  Write down your questions so you remember to ask them during your visits  Care for your eyelid:  Always wash your hands with soap and water before and after eye care:  · Use artificial tears  twice a day if you have dry eye  · Apply a warm compress  for 10 minutes once a day to loosen crusts and to decrease itching and burning  · Gently scrub your upper and lower eyelid  with 2 to 3 drops of baby shampoo in ½ cup warm water 2 times a day  This will help open your clogged oil glands and remove pus or other material stuck to your eyelid  · Massage your upper and lower eyelid in small circles for 5 seconds  to loosen oil plugs and to decrease inflammation  · Do not wear contact lenses or eye makeup  until your eye has healed  Contact your healthcare provider if:   · Your vision changes  · Your signs and symptoms get worse, even after treatment  · Your signs and symptoms return  · You have a lump on your eyelid  · You have a pus-filled sore on your eyelid  · You have questions or concerns about your condition or care    Return to the emergency department if: · You have severe pain  · You have vision loss  © 2017 [employer] Information is for End User's use only and may not be sold, redistributed or otherwise used for commercial purposes  All illustrations and images included in CareNotes® are the copyrighted property of A D [employer] M [employer]  or Giovanni Ibarra  The above information is an  only  It is not intended as medical advice for individual conditions or treatments  Talk to your doctor, nurse or pharmacist before following any medical regimen to see if it is safe and effective for you

## 2020-01-27 NOTE — PROGRESS NOTES
[facility]        NAME: Jd Welch is a 45 y o  female  : 1975    MRN: [id number]  DATE: 2020  TIME: 9:11 AM    Assessment and Plan   Blepharitis of upper and lower eyelids of both eyes, unspecified type [H01 00A, H01 00B]  1  Blepharitis of upper and lower eyelids of both eyes, unspecified type  olopatadine (PATANOL) 0 1 % ophthalmic solution    erythromycin (ILOTYCIN) ophthalmic ointment         Patient Instructions   Patanol drops as directed  Erythromycin ointment as directed  Follow up with your PCP    Follow up with PCP in 3-5 days  Proceed to  ER if symptoms worsen  Chief Complaint     Chief Complaint   Patient presents with    Eye Pain     eyes red/swollen/itchy b/l  x less then 1 week         History of Present Illness       Patient is a 45year old female with a history of recurrent blepharitis  She was seen by her PCP for further testing  She reports bilateral upper and lower eye lid erythema and swelling since last night  Symptoms worsened today  She was referred to a dermatologist but the appointment is not for another 3 months  She has been prescribed erythromycin ointment and patanol in the past with improvement of symptoms  Denies eye pain, visual disturbance, or discharge from the eye  Review of Systems   Review of Systems   Constitutional: Negative for activity change, chills and fever  Eyes: Positive for redness  Negative for discharge  Respiratory: Negative for cough  Neurological: Negative for headaches           Current Medications       Current Outpatient Medications:     levonorgestrel (MIRENA) 20 MCG/24HR IUD, 1 each by Intrauterine route once, Disp: , Rfl:     loratadine (CLARITIN) 10 mg tablet, Take 10 mg by mouth daily, Disp: , Rfl:     erythromycin (ILOTYCIN) ophthalmic ointment, Administer 0 5 inches to both eyes every 6 (six) hours, Disp: 3 5 g, Rfl: 0    olopatadine (PATANOL) 0 1 % ophthalmic solution, Administer 1 drop to both eyes 2 (two) times a day, Disp: 5 mL, Rfl: 0    Current Allergies     Allergies as of 01/26/2020    (No Known Allergies)            The following portions of the patient's history were reviewed and updated as appropriate: allergies, current medications, past family history, past medical history, past social history, past surgical history and problem list      Past Medical History:   Diagnosis Date    Hypothyroidism        History reviewed  No pertinent surgical history  Family History   Problem Relation Age of Onset    Depression Mother     Drug abuse Mother     Anxiety disorder Mother         anxiety and depression    Heart disease Mother         dx in 19's    Thyroid disease Mother     Heart disease Father         dx in 39's    Alcohol abuse Father     Dementia Maternal Grandmother     Alcohol abuse Maternal Grandfather     No Known Problems Sister     No Known Problems Brother     Allergies Son     Breast cancer Neg Hx     Cervical cancer Neg Hx     Colon cancer Neg Hx     Ovarian cancer Neg Hx     Uterine cancer Neg Hx          Medications have been verified  Objective   /84   Pulse 75   Temp 97 5 °F (36 4 °C)   Resp 16   Ht 5' 4" (1 626 m)   Wt 89 8 kg (198 lb)   SpO2 98%   BMI 33 99 kg/m²        Physical Exam     Physical Exam   Constitutional: She is oriented to person, place, and time  Vital signs are normal  She appears well-developed and well-nourished  She is active  No distress  HENT:   Head: Normocephalic  Right Ear: Hearing normal    Left Ear: Hearing normal    Nose: Nose normal    Eyes:   Upper and lower lid erythema and minimal swelling  Dry skin on upper lids  Cardiovascular: Normal rate, regular rhythm, S1 normal, S2 normal and normal heart sounds  Pulmonary/Chest: Effort normal  No respiratory distress  Neurological: She is alert and oriented to person, place, and time  She is not disoriented  Skin: Skin is warm, dry and intact

## 2020-01-31 ENCOUNTER — OFFICE VISIT (OUTPATIENT)
Dept: FAMILY MEDICINE CLINIC | Facility: CLINIC | Age: 45
End: 2020-01-31
Payer: COMMERCIAL

## 2020-01-31 VITALS
WEIGHT: 197 LBS | DIASTOLIC BLOOD PRESSURE: 70 MMHG | RESPIRATION RATE: 16 BRPM | HEART RATE: 75 BPM | BODY MASS INDEX: 33.63 KG/M2 | HEIGHT: 64 IN | OXYGEN SATURATION: 98 % | SYSTOLIC BLOOD PRESSURE: 120 MMHG

## 2020-01-31 DIAGNOSIS — E55.9 VITAMIN D DEFICIENCY: ICD-10-CM

## 2020-01-31 DIAGNOSIS — H01.00A BLEPHARITIS OF UPPER AND LOWER EYELIDS OF BOTH EYES, UNSPECIFIED TYPE: ICD-10-CM

## 2020-01-31 DIAGNOSIS — H01.00B BLEPHARITIS OF UPPER AND LOWER EYELIDS OF BOTH EYES, UNSPECIFIED TYPE: ICD-10-CM

## 2020-01-31 DIAGNOSIS — E03.9 ADULT HYPOTHYROIDISM: Primary | ICD-10-CM

## 2020-01-31 PROCEDURE — 99214 OFFICE O/P EST MOD 30 MIN: CPT | Performed by: FAMILY MEDICINE

## 2020-01-31 PROCEDURE — 3008F BODY MASS INDEX DOCD: CPT | Performed by: FAMILY MEDICINE

## 2020-01-31 PROCEDURE — 1036F TOBACCO NON-USER: CPT | Performed by: FAMILY MEDICINE

## 2020-01-31 RX ORDER — ERGOCALCIFEROL 1.25 MG/1
50000 CAPSULE ORAL WEEKLY
Qty: 12 CAPSULE | Refills: 0 | Status: SHIPPED | OUTPATIENT
Start: 2020-01-31 | End: 2020-06-05 | Stop reason: ALTCHOICE

## 2020-01-31 RX ORDER — LEVOTHYROXINE SODIUM 0.07 MG/1
75 TABLET ORAL DAILY
Qty: 60 TABLET | Refills: 0 | Status: SHIPPED | OUTPATIENT
Start: 2020-01-31 | End: 2020-03-20

## 2020-01-31 NOTE — PROGRESS NOTES
Assessment/Plan:   Diagnoses and all orders for this visit:    Adult hypothyroidism  -     levothyroxine 75 mcg tablet; Take 1 tablet (75 mcg total) by mouth daily  -     TSH, 3rd generation with Free T4 reflex; Future  - TSH 19 600 and T4 0 6  - will start Levothyroxine 75mcg QD and repeat TSH in 6wks to discern if dose adjustment is warranted (likely)   - RTO in 6wks for f/u - pt aware and agreeable    Blepharitis of upper and lower eyelids of both eyes, unspecified type  - reviewed UC note from 1/26/2020  - advised to use Vaseline around eyes at night as a moisturizing agent  - has an appt with Derm in 3/2020     Vitamin D deficiency  -     ergocalciferol (VITAMIN D2) 50,000 units; Take 1 capsule (50,000 Units total) by mouth once a week  - Vit D 14 4  - advised Vit D 50,000IU Qwk x12wks and then to take an OTC Vit D (minimum 2000IU QD)     Adult BMI 33 0-33 9 kg/sq m  -     Ambulatory referral to Nutrition Services; Future  - BMI 33 8   - discussed diet and encouraged exercise  - educated that it takes 3500 shari to lose 1lb  - advised to cut down on carbs, 5 servings of fruits/veggies/day, increase water intake (65-80oz/water/day)   - appropriate weight loss goal for women = 0 5-1lb/week  - per the Heart Association - 150mins/exercise/week, but to lose and maintain weight 200-300mins/exercise/week   - can also be a component of uncontrolled hypothyroidism     Other orders  -     Cancel: Ambulatory referral to Gynecology; Future  -     Cancel: Ambulatory referral to Gynecology; Future          Subjective:    Patient ID: Sanjeev Hall is a 45 y o  female    37yo F presents to the office for f/u  1) Hypothyroidism   - TSH 19 600 and T4 0 60   - has been off her Levothyroxine for >1yr  - (+) cold intolerance, dry skin  - denies F/C/N/V/CP/palpitations/SOB/wheezing/abd pain/C/thinning hair or brittle nails   - (+) FHx of thyroid dz in Mother   2) Blepharitis of upper and lower eyelids of both eyes  - used to wear make-up - but threw it all away   - no new moisturizers, face-wash, no changes to her usual routine   - no new foods, no travel   - has had this happen for the past 2 varma   - does not wear contacts  - s/p 2 rounds of Steroids - 50mg x5days   - has been using erythromycin ointments - s/p 2 rounds  - currently using Claritin 10mg QD and Vit E oil for dry skin   - has to ice her eyes daily to decrease swelling/puffiness - not erythematous in the office today  - follows a low salt diet   - did have a full Optho w/u last year - non-conclusive   - was given eRx for Zpak at last OV = which per pt didn't help  - went to  on 1/26/2020 and was re-prescribed erythromycin ointment and patanol gtts   - (+) mild swelling of eyelids, no discharge   3) Vit D deficiency - Vit D 14 4  4) Obesity - BMI 33 8   - mentions a 20lbs weight gain in the past year from eating out while traveling for work   - has made dietary modifications to her diet over the past 3days   - has cut out sugary drinks/sodas  - has been eating more baked chicken and trying to limit red meat intake       The following portions of the patient's history were reviewed and updated as appropriate: allergies, current medications, past family history, past medical history, past social history, past surgical history and problem list     Review of Systems  as per HPI    Objective:  /70   Pulse 75   Resp 16   Ht 5' 4" (1 626 m)   Wt 89 4 kg (197 lb)   SpO2 98%   BMI 33 81 kg/m²    Physical Exam   Constitutional: She is oriented to person, place, and time  She appears well-developed and well-nourished  No distress  HENT:   Head: Normocephalic and atraumatic  Right Ear: External ear normal    Left Ear: External ear normal    Nose: Nose normal    Dry skin on lids and eyes mildly swollen, no drainage/dischagre, no conjunctivitis    Eyes: Conjunctivae and EOM are normal  Right eye exhibits no discharge  Left eye exhibits no discharge  No scleral icterus  Neck: Normal range of motion  Neck supple  Cardiovascular: Normal rate, regular rhythm and normal heart sounds  Exam reveals no gallop and no friction rub  No murmur heard  Pulmonary/Chest: Effort normal and breath sounds normal  No stridor  No respiratory distress  She has no wheezes  She has no rales  Abdominal: Soft  Bowel sounds are normal    BMI 33 8   Musculoskeletal: Normal range of motion  She exhibits no edema, tenderness or deformity  Neurological: She is alert and oriented to person, place, and time  Skin: Skin is warm and dry  She is not diaphoretic  Psychiatric: She has a normal mood and affect  Her behavior is normal    Vitals reviewed

## 2020-03-03 DIAGNOSIS — E03.9 ADULT HYPOTHYROIDISM: ICD-10-CM

## 2020-03-03 RX ORDER — LEVOTHYROXINE SODIUM 0.07 MG/1
75 TABLET ORAL DAILY
Qty: 30 TABLET | Refills: 0 | Status: CANCELLED | OUTPATIENT
Start: 2020-03-03

## 2020-03-18 ENCOUNTER — APPOINTMENT (OUTPATIENT)
Dept: LAB | Facility: CLINIC | Age: 45
End: 2020-03-18
Payer: COMMERCIAL

## 2020-03-18 DIAGNOSIS — E03.9 ADULT HYPOTHYROIDISM: ICD-10-CM

## 2020-03-18 LAB
T4 FREE SERPL-MCNC: 0.92 NG/DL (ref 0.76–1.46)
TSH SERPL DL<=0.05 MIU/L-ACNC: 5.47 UIU/ML (ref 0.36–3.74)

## 2020-03-18 PROCEDURE — 36415 COLL VENOUS BLD VENIPUNCTURE: CPT

## 2020-03-18 PROCEDURE — 84439 ASSAY OF FREE THYROXINE: CPT

## 2020-03-18 PROCEDURE — 84443 ASSAY THYROID STIM HORMONE: CPT

## 2020-03-20 ENCOUNTER — OFFICE VISIT (OUTPATIENT)
Dept: FAMILY MEDICINE CLINIC | Facility: CLINIC | Age: 45
End: 2020-03-20
Payer: COMMERCIAL

## 2020-03-20 VITALS
OXYGEN SATURATION: 98 % | DIASTOLIC BLOOD PRESSURE: 70 MMHG | HEIGHT: 64 IN | BODY MASS INDEX: 34.15 KG/M2 | HEART RATE: 72 BPM | SYSTOLIC BLOOD PRESSURE: 120 MMHG | WEIGHT: 200 LBS | RESPIRATION RATE: 16 BRPM | TEMPERATURE: 98 F

## 2020-03-20 DIAGNOSIS — E55.9 VITAMIN D DEFICIENCY: ICD-10-CM

## 2020-03-20 DIAGNOSIS — E03.9 ADULT HYPOTHYROIDISM: Primary | ICD-10-CM

## 2020-03-20 PROCEDURE — 99214 OFFICE O/P EST MOD 30 MIN: CPT | Performed by: FAMILY MEDICINE

## 2020-03-20 PROCEDURE — 3008F BODY MASS INDEX DOCD: CPT | Performed by: FAMILY MEDICINE

## 2020-03-20 PROCEDURE — 1036F TOBACCO NON-USER: CPT | Performed by: FAMILY MEDICINE

## 2020-03-20 RX ORDER — LEVOTHYROXINE SODIUM 88 UG/1
88 TABLET ORAL DAILY
Qty: 60 TABLET | Refills: 0 | Status: SHIPPED | OUTPATIENT
Start: 2020-03-20 | End: 2020-05-18 | Stop reason: SDUPTHER

## 2020-03-20 NOTE — PROGRESS NOTES
Assessment/Plan:   Diagnoses and all orders for this visit:    Adult hypothyroidism  -     levothyroxine 88 mcg tablet; Take 1 tablet (88 mcg total) by mouth daily  -     TSH, 3rd generation with Free T4 reflex; Future  - (+) FHx of thyroid dz in Mother   - TSH 5 470 <-- 19 600 (T4 0 92 <-- 0 60)   - will increase Levothyroxine from 75mcg to 88mcg QD and recheck TSH in 6-8wks - to discern if dose adjustment is warranted   - (+) increased forgetfulness -pt has an appt with Psych for eval on 4/16/2020 - concerned as FHx of dementia/ADHD/Bipolar     Vitamin D deficiency  - completed Qwkly dose and started on an OTC supplement this week     BMI 34 0-34 9,adult  - BMI 34 33   - discussed diet and encouraged exercise  - educated that it takes 3500 shari to lose 1lb  - advised to cut down on carbs, 5 servings of fruits/veggies/day, increase water intake (65-80oz/water/day)   - appropriate weight loss goal for women = 0 5-1lb/week  - per the Heart Association - 150mins/exercise/week, but to lose and maintain weight 200-300mins/exercise/week   - can also be a component of uncontrolled hypothyroidism     Other orders  -     Cancel: Ambulatory referral to Gynecology; Future        Subjective:    Patient ID: Bang Martinez is a 39 y o  female    37yo F presents to the office for f/u  1) Hypothyroidism   - TSH 5 470 <-- 19 600 (T4 0 92 <-- 0 60)   - was restarted on Levothyroxine 75mcg QD at last OV and tolerating it well   - feeling "exhausted" -- feels like that has doubled - but her schedule has not changed/no increased stress  - (+) increased confusion in the past few weeks ("maybe noticing it more now") - [facility] her earlier in the day and by afternoon pt forgot, has been forgetting co-worker's names   - has an appt with Psych (Dr Fate Baumgarten) on 4/16/2020   - (+) cold intolerance ("heat does not bother me"), "very" dry skin, intermittent thinning hair (has been using a Biotin shampoo), "fragile" nails   - denies F/C/N/V/HA/CP/palpitations/SOB/wheezing/abd pain/C  - (+) FHx of thyroid dz in Mother   2) Blepharitis of upper and lower eyelids of both eyes - resolved   3) Vit D deficiency - Vit D 14 4 - completed her Qwkly Rx and now taking an OTC which she started this week   4) Obesity - BMI 34 33 <-- 33 8   - has cut down on sugary drinks/soda   - has been eating more baked chicken and trying to limit red meat intake   - trying to exercise at home now that GYM has closed due to COVID-19   5) General   - has to reschedule her Ob/Gyn annual exam       The following portions of the patient's history were reviewed and updated as appropriate: allergies, current medications, past family history, past medical history, past social history, past surgical history and problem list     Review of Systems  as per HPI    Objective:  /70   Pulse 72   Temp 98 °F (36 7 °C)   Resp 16   Ht 5' 4" (1 626 m)   Wt 90 7 kg (200 lb)   SpO2 98%   BMI 34 33 kg/m²    Physical Exam   Constitutional: She is oriented to person, place, and time  She appears well-developed and well-nourished  No distress  HENT:   Head: Normocephalic and atraumatic  Right Ear: External ear normal    Left Ear: External ear normal    Nose: Nose normal    Eyes: Conjunctivae and EOM are normal  Right eye exhibits no discharge  Left eye exhibits no discharge  No scleral icterus  Neck: Normal range of motion  Neck supple  No thyromegaly present  Cardiovascular: Normal rate, regular rhythm and normal heart sounds  Exam reveals no gallop and no friction rub  No murmur heard  Pulmonary/Chest: Effort normal and breath sounds normal  No stridor  No respiratory distress  She has no wheezes  She has no rales  Abdominal: Soft  Bowel sounds are normal    BMI 34 33   Musculoskeletal: Normal range of motion  She exhibits no edema or tenderness  Neurological: She is alert and oriented to person, place, and time  Skin: Skin is warm  No rash noted   She is not diaphoretic  No pallor  Psychiatric: She has a normal mood and affect  Her behavior is normal    Vitals reviewed  BMI Counseling: Body mass index is 34 33 kg/m²  The BMI is above normal  Nutrition recommendations include reducing portion sizes, decreasing overall calorie intake, 3-5 servings of fruits/vegetables daily and consuming healthier snacks  Exercise recommendations include moderate aerobic physical activity for 150 minutes/week, exercising 3-5 times per week and strength training exercises

## 2020-04-16 PROBLEM — F41.9 ANXIETY: Status: ACTIVE | Noted: 2020-04-16

## 2020-05-18 DIAGNOSIS — E03.9 ADULT HYPOTHYROIDISM: ICD-10-CM

## 2020-05-20 RX ORDER — LEVOTHYROXINE SODIUM 88 UG/1
88 TABLET ORAL DAILY
Qty: 30 TABLET | Refills: 0 | Status: SHIPPED | OUTPATIENT
Start: 2020-05-20 | End: 2020-06-05

## 2020-06-04 ENCOUNTER — APPOINTMENT (OUTPATIENT)
Dept: LAB | Facility: CLINIC | Age: 45
End: 2020-06-04
Payer: COMMERCIAL

## 2020-06-04 ENCOUNTER — TRANSCRIBE ORDERS (OUTPATIENT)
Dept: LAB | Facility: CLINIC | Age: 45
End: 2020-06-04

## 2020-06-04 DIAGNOSIS — E03.9 ADULT HYPOTHYROIDISM: ICD-10-CM

## 2020-06-04 DIAGNOSIS — R63.1 EXCESSIVE THIRST: ICD-10-CM

## 2020-06-04 LAB
T4 FREE SERPL-MCNC: 0.66 NG/DL (ref 0.76–1.46)
TSH SERPL DL<=0.05 MIU/L-ACNC: 43.7 UIU/ML (ref 0.36–3.74)

## 2020-06-04 PROCEDURE — 36415 COLL VENOUS BLD VENIPUNCTURE: CPT

## 2020-06-04 PROCEDURE — 84439 ASSAY OF FREE THYROXINE: CPT

## 2020-06-04 PROCEDURE — 80053 COMPREHEN METABOLIC PANEL: CPT

## 2020-06-04 PROCEDURE — 84443 ASSAY THYROID STIM HORMONE: CPT

## 2020-06-05 ENCOUNTER — TELEMEDICINE (OUTPATIENT)
Dept: FAMILY MEDICINE CLINIC | Facility: CLINIC | Age: 45
End: 2020-06-05
Payer: COMMERCIAL

## 2020-06-05 DIAGNOSIS — E55.9 VITAMIN D DEFICIENCY: ICD-10-CM

## 2020-06-05 DIAGNOSIS — E03.9 ADULT HYPOTHYROIDISM: Primary | ICD-10-CM

## 2020-06-05 DIAGNOSIS — R63.1 EXCESSIVE THIRST: ICD-10-CM

## 2020-06-05 LAB
ALBUMIN SERPL BCP-MCNC: 3.7 G/DL (ref 3.5–5)
ALP SERPL-CCNC: 54 U/L (ref 46–116)
ALT SERPL W P-5'-P-CCNC: 21 U/L (ref 12–78)
ANION GAP SERPL CALCULATED.3IONS-SCNC: 6 MMOL/L (ref 4–13)
AST SERPL W P-5'-P-CCNC: 15 U/L (ref 5–45)
BILIRUB SERPL-MCNC: 0.75 MG/DL (ref 0.2–1)
BUN SERPL-MCNC: 15 MG/DL (ref 5–25)
CALCIUM SERPL-MCNC: 9.1 MG/DL (ref 8.3–10.1)
CHLORIDE SERPL-SCNC: 108 MMOL/L (ref 100–108)
CO2 SERPL-SCNC: 24 MMOL/L (ref 21–32)
CREAT SERPL-MCNC: 0.92 MG/DL (ref 0.6–1.3)
GFR SERPL CREATININE-BSD FRML MDRD: 77 ML/MIN/1.73SQ M
GLUCOSE SERPL-MCNC: 80 MG/DL (ref 65–140)
POTASSIUM SERPL-SCNC: 4.5 MMOL/L (ref 3.5–5.3)
PROT SERPL-MCNC: 7.4 G/DL (ref 6.4–8.2)
SODIUM SERPL-SCNC: 138 MMOL/L (ref 136–145)

## 2020-06-05 PROCEDURE — 99214 OFFICE O/P EST MOD 30 MIN: CPT | Performed by: FAMILY MEDICINE

## 2020-06-05 RX ORDER — LEVOTHYROXINE SODIUM 112 UG/1
112 TABLET ORAL DAILY
Qty: 60 TABLET | Refills: 0 | Status: SHIPPED | OUTPATIENT
Start: 2020-06-05 | End: 2020-06-05

## 2020-06-05 RX ORDER — LEVOTHYROXINE SODIUM 0.1 MG/1
100 TABLET ORAL DAILY
Qty: 60 TABLET | Refills: 0 | Status: SHIPPED | OUTPATIENT
Start: 2020-06-05 | End: 2020-06-15

## 2020-06-12 ENCOUNTER — TELEPHONE (OUTPATIENT)
Dept: ENDOCRINOLOGY | Facility: CLINIC | Age: 45
End: 2020-06-12

## 2020-06-15 ENCOUNTER — OFFICE VISIT (OUTPATIENT)
Dept: ENDOCRINOLOGY | Facility: CLINIC | Age: 45
End: 2020-06-15
Payer: COMMERCIAL

## 2020-06-15 VITALS
DIASTOLIC BLOOD PRESSURE: 72 MMHG | BODY MASS INDEX: 33.63 KG/M2 | HEIGHT: 64 IN | WEIGHT: 197 LBS | HEART RATE: 67 BPM | TEMPERATURE: 98 F | SYSTOLIC BLOOD PRESSURE: 118 MMHG

## 2020-06-15 DIAGNOSIS — E66.9 OBESITY (BMI 30.0-34.9): ICD-10-CM

## 2020-06-15 DIAGNOSIS — E78.00 PURE HYPERCHOLESTEROLEMIA: ICD-10-CM

## 2020-06-15 DIAGNOSIS — E03.9 ADULT HYPOTHYROIDISM: ICD-10-CM

## 2020-06-15 DIAGNOSIS — E03.9 ACQUIRED HYPOTHYROIDISM: Primary | ICD-10-CM

## 2020-06-15 DIAGNOSIS — E55.9 VITAMIN D DEFICIENCY: ICD-10-CM

## 2020-06-15 PROCEDURE — 99244 OFF/OP CNSLTJ NEW/EST MOD 40: CPT | Performed by: INTERNAL MEDICINE

## 2020-06-15 PROCEDURE — 3008F BODY MASS INDEX DOCD: CPT | Performed by: INTERNAL MEDICINE

## 2020-06-15 RX ORDER — LEVOTHYROXINE SODIUM 112 UG/1
TABLET ORAL
COMMUNITY
Start: 2020-06-05 | End: 2020-06-15

## 2020-06-15 RX ORDER — LEVOTHYROXINE SODIUM 0.12 MG/1
125 TABLET ORAL DAILY
Qty: 90 TABLET | Refills: 3 | Status: SHIPPED | OUTPATIENT
Start: 2020-06-15 | End: 2021-03-19

## 2020-06-16 ENCOUNTER — OFFICE VISIT (OUTPATIENT)
Dept: URGENT CARE | Facility: CLINIC | Age: 45
End: 2020-06-16
Payer: COMMERCIAL

## 2020-06-16 VITALS
WEIGHT: 198 LBS | SYSTOLIC BLOOD PRESSURE: 130 MMHG | DIASTOLIC BLOOD PRESSURE: 75 MMHG | RESPIRATION RATE: 18 BRPM | OXYGEN SATURATION: 96 % | TEMPERATURE: 97.6 F | HEIGHT: 64 IN | BODY MASS INDEX: 33.8 KG/M2 | HEART RATE: 83 BPM
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
## 2020-06-16 DIAGNOSIS — S00.81XA CAT SCRATCH OF FACE, INITIAL ENCOUNTER: Primary | ICD-10-CM

## 2020-06-16 PROCEDURE — 99213 OFFICE O/P EST LOW 20 MIN: CPT | Performed by: PHYSICIAN ASSISTANT

## 2020-06-16 RX ORDER — AMOXICILLIN AND CLAVULANATE POTASSIUM 875; 125 MG/1; MG/1
1 TABLET, FILM COATED ORAL EVERY 12 HOURS SCHEDULED
Qty: 14 TABLET | Refills: 0 | Status: SHIPPED | OUTPATIENT
Start: 2020-06-16 | End: 2020-06-23

## 2020-06-27 ENCOUNTER — APPOINTMENT (EMERGENCY)
Dept: RADIOLOGY | Facility: HOSPITAL | Age: 45
End: 2020-06-27
Payer: COMMERCIAL

## 2020-06-27 ENCOUNTER — HOSPITAL ENCOUNTER (EMERGENCY)
Facility: HOSPITAL | Age: 45
Discharge: HOME | End: 2020-06-27
Attending: EMERGENCY MEDICINE | Admitting: EMERGENCY MEDICINE
Payer: COMMERCIAL

## 2020-06-27 VITALS
DIASTOLIC BLOOD PRESSURE: 80 MMHG | TEMPERATURE: 98.4 F | RESPIRATION RATE: 14 BRPM | OXYGEN SATURATION: 98 % | SYSTOLIC BLOOD PRESSURE: 139 MMHG | HEART RATE: 63 BPM

## 2020-06-27 DIAGNOSIS — M77.12 LATERAL EPICONDYLITIS OF LEFT ELBOW: Primary | ICD-10-CM

## 2020-06-27 LAB
EXT PREG TEST URINE: NEGATIVE
EXT. CONTROL ED NAV: NORMAL

## 2020-06-27 PROCEDURE — 73080 X-RAY EXAM OF ELBOW: CPT

## 2020-06-27 PROCEDURE — 81025 URINE PREGNANCY TEST: CPT | Performed by: PHYSICIAN ASSISTANT

## 2020-06-27 PROCEDURE — 99284 EMERGENCY DEPT VISIT MOD MDM: CPT

## 2020-06-27 PROCEDURE — 99283 EMERGENCY DEPT VISIT LOW MDM: CPT | Performed by: EMERGENCY MEDICINE

## 2020-06-27 RX ORDER — KETOROLAC TROMETHAMINE 30 MG/ML
15 INJECTION, SOLUTION INTRAMUSCULAR; INTRAVENOUS ONCE
Status: DISCONTINUED | OUTPATIENT
Start: 2020-06-27 | End: 2020-06-27 | Stop reason: HOSPADM

## 2020-06-27 RX ORDER — IBUPROFEN 400 MG/1
400 TABLET ORAL EVERY 6 HOURS PRN
Qty: 12 TABLET | Refills: 0 | Status: SHIPPED | OUTPATIENT
Start: 2020-06-27 | End: 2021-03-10

## 2021-03-08 ENCOUNTER — HOSPITAL ENCOUNTER (EMERGENCY)
Facility: HOSPITAL | Age: 46
Discharge: HOME | End: 2021-03-08
Attending: EMERGENCY MEDICINE | Admitting: EMERGENCY MEDICINE
Payer: COMMERCIAL

## 2021-03-08 ENCOUNTER — APPOINTMENT (EMERGENCY)
Dept: RADIOLOGY | Facility: HOSPITAL | Age: 46
End: 2021-03-08
Payer: COMMERCIAL

## 2021-03-08 VITALS
RESPIRATION RATE: 16 BRPM | TEMPERATURE: 98.9 F | SYSTOLIC BLOOD PRESSURE: 137 MMHG | DIASTOLIC BLOOD PRESSURE: 63 MMHG | HEART RATE: 71 BPM | OXYGEN SATURATION: 99 %

## 2021-03-08 DIAGNOSIS — M72.2 PLANTAR FASCIITIS OF LEFT FOOT: Primary | ICD-10-CM

## 2021-03-08 PROCEDURE — 99283 EMERGENCY DEPT VISIT LOW MDM: CPT

## 2021-03-08 PROCEDURE — 96372 THER/PROPH/DIAG INJ SC/IM: CPT

## 2021-03-08 PROCEDURE — 73630 X-RAY EXAM OF FOOT: CPT

## 2021-03-08 PROCEDURE — 99284 EMERGENCY DEPT VISIT MOD MDM: CPT | Performed by: PHYSICIAN ASSISTANT

## 2021-03-08 RX ORDER — ACETAMINOPHEN 325 MG/1
650 TABLET ORAL ONCE
Status: COMPLETED | OUTPATIENT
Start: 2021-03-08 | End: 2021-03-08

## 2021-03-08 RX ORDER — KETOROLAC TROMETHAMINE 30 MG/ML
15 INJECTION, SOLUTION INTRAMUSCULAR; INTRAVENOUS ONCE
Status: COMPLETED | OUTPATIENT
Start: 2021-03-08 | End: 2021-03-08

## 2021-03-08 RX ORDER — SENNOSIDES 8.6 MG
650 CAPSULE ORAL EVERY 8 HOURS PRN
Qty: 9 TABLET | Refills: 0 | Status: SHIPPED | OUTPATIENT
Start: 2021-03-08 | End: 2021-03-11

## 2021-03-08 RX ORDER — IBUPROFEN 400 MG/1
400 TABLET ORAL EVERY 6 HOURS PRN
Qty: 12 TABLET | Refills: 0 | Status: SHIPPED | OUTPATIENT
Start: 2021-03-08 | End: 2021-04-07 | Stop reason: ALTCHOICE

## 2021-03-08 RX ADMIN — KETOROLAC TROMETHAMINE 15 MG: 30 INJECTION, SOLUTION INTRAMUSCULAR at 20:32

## 2021-03-08 RX ADMIN — ACETAMINOPHEN 650 MG: 325 TABLET, FILM COATED ORAL at 20:32

## 2021-03-08 NOTE — Clinical Note
Marian Adams was seen and treated in our emergency department on 3/8/2021  Diagnosis:     Waleska Stevens    She may return on this date: 03/10/2021         If you have any questions or concerns, please don't hesitate to call        Liane Wilson PA-C    ______________________________           _______________          _______________  Hospital Representative                              Date                                Time

## 2021-03-09 NOTE — ED PROVIDER NOTES
History  Chief Complaint   Patient presents with    Leg Pain     had left hip to knee pain last week which resolved today has left ankle /heel pain  no known injury     Patient is a 53-year-old female with no significant past medical or surgical history that presents emergency department with aching persistent worsening left foot pain for 2 days  Patient states that she had history of left foot pain in the past with [facility] after a walk on it after I get up in the morning "  Patient states that chest associated symptomatology of dull and achy nonradiating lateral left leg pain beginning with the current ED presentation of left foot pain  Patient denies new shoes  Patient denies palliative factors with provocative factors of dorsiflexion  Patient denies not effective treatment  Patient denies fevers, chills, nausea, vomiting, diarrhea, constipation urinary symptoms  Patient's recent fall or recent trauma  Patient denies sick contacts or recent travel  Patient denies chest pain, shortness of breath, and abdominal pain  History provided by:  Patient   used: No        Prior to Admission Medications   Prescriptions Last Dose Informant Patient Reported? Taking? VITAMIN D PO 3/8/2021 at 0500  Yes Yes   Sig: Take by mouth daily   ibuprofen (MOTRIN) 400 mg tablet 3/8/2021 at 1300  No Yes   Sig: Take 1 tablet (400 mg total) by mouth every 6 (six) hours as needed for mild pain for up to 3 days   Patient taking differently: Take 800 mg by mouth every 6 (six) hours as needed for mild pain    levonorgestrel (MIRENA) 20 MCG/24HR IUD   Yes No   Si each by Intrauterine route once   levothyroxine 125 mcg tablet  at 0500  No Yes   Sig: Take 1 tablet (125 mcg total) by mouth daily      Facility-Administered Medications: None       Past Medical History:   Diagnosis Date    Anxiety 2020    Hypothyroidism        History reviewed  No pertinent surgical history      Family History   Problem Relation Age of Onset    Depression Mother     Drug abuse Mother     Anxiety disorder Mother         anxiety and depression    Heart disease Mother         dx in 19's    Thyroid disease Mother     Heart disease Father         dx in 39's    Alcohol abuse Father     Alzheimer's disease Maternal Grandmother     Alcohol abuse Maternal Grandfather     No Known Problems Sister     No Known Problems Brother     Allergies Son     ADD / ADHD Son     Bipolar disorder Son     ADD / ADHD Son     Breast cancer Neg Hx     Cervical cancer Neg Hx     Colon cancer Neg Hx     Ovarian cancer Neg Hx     Uterine cancer Neg Hx      I have reviewed and agree with the history as documented  E-Cigarette/Vaping     E-Cigarette/Vaping Substances     Social History     Tobacco Use    Smoking status: Former Smoker     Packs/day: 0 33     Years: 15 00     Pack years: 4 95     Quit date: 2015     Years since quittin 0    Smokeless tobacco: Never Used   Substance Use Topics    Alcohol use: Yes     Frequency: 2-4 times a month     Comment: social    Drug use: No       Review of Systems   Constitutional: Negative for activity change, appetite change, chills and fever  HENT: Negative for congestion, postnasal drip, rhinorrhea, sinus pressure, sinus pain, sore throat and tinnitus  Eyes: Negative for photophobia and visual disturbance  Respiratory: Negative for cough, chest tightness and shortness of breath  Cardiovascular: Negative for chest pain and palpitations  Gastrointestinal: Negative for abdominal pain, constipation, diarrhea, nausea and vomiting  Genitourinary: Negative for difficulty urinating, dysuria, flank pain, frequency and urgency  Musculoskeletal: Positive for arthralgias  Negative for back pain, gait problem, neck pain and neck stiffness  Skin: Negative for pallor and rash  Allergic/Immunologic: Negative for environmental allergies and food allergies     Neurological: Negative for dizziness, weakness, numbness and headaches  Psychiatric/Behavioral: Negative for confusion  All other systems reviewed and are negative  Physical Exam  Physical Exam  Vitals signs and nursing note reviewed  Constitutional:       General: She is awake  Appearance: Normal appearance  She is well-developed  She is not ill-appearing, toxic-appearing or diaphoretic  Comments: /63 (BP Location: Left arm)   Pulse 71   Temp 98 9 °F (37 2 °C) (Oral)   Resp 16   SpO2 99%      HENT:      Head: Normocephalic and atraumatic  Right Ear: Hearing and external ear normal  No decreased hearing noted  No drainage, swelling or tenderness  No mastoid tenderness  Left Ear: Hearing and external ear normal  No decreased hearing noted  No drainage, swelling or tenderness  No mastoid tenderness  Nose: Nose normal       Mouth/Throat:      Lips: Pink  Mouth: Mucous membranes are moist       Pharynx: Oropharynx is clear  Uvula midline  Eyes:      General: Lids are normal  Vision grossly intact  Right eye: No discharge  Left eye: No discharge  Extraocular Movements: Extraocular movements intact  Conjunctiva/sclera: Conjunctivae normal       Pupils: Pupils are equal, round, and reactive to light  Neck:      Musculoskeletal: Full passive range of motion without pain and normal range of motion  Vascular: No JVD  Trachea: Trachea and phonation normal    Cardiovascular:      Rate and Rhythm: Normal rate and regular rhythm  Pulses: Normal pulses  Radial pulses are 2+ on the right side and 2+ on the left side  Dorsalis pedis pulses are 2+ on the right side and 2+ on the left side  Posterior tibial pulses are 2+ on the right side and 2+ on the left side  Heart sounds: Normal heart sounds  Pulmonary:      Effort: Pulmonary effort is normal       Breath sounds: Normal breath sounds  Abdominal:      General: Abdomen is flat  Palpations: Abdomen is not rigid  Musculoskeletal: Normal range of motion  Left upper leg: Normal       Left lower leg: Normal       Left foot: Tenderness present  Feet:       Comments: Passive ROM intact  Upper and lower extremity 4/5 bilaterally  Neurovascularly intact  No grinding or clicking of joints     Feet:      Right foot:      Skin integrity: Skin integrity normal       Left foot:      Skin integrity: Skin integrity normal       Comments: Pain with left foot dorsiflexion    Lymphadenopathy:      Head:      Right side of head: No submental, submandibular, tonsillar, preauricular, posterior auricular or occipital adenopathy  Left side of head: No submental, submandibular, tonsillar, preauricular, posterior auricular or occipital adenopathy  Skin:     General: Skin is warm  Capillary Refill: Capillary refill takes less than 2 seconds  Neurological:      General: No focal deficit present  Mental Status: She is alert and oriented to person, place, and time  GCS: GCS eye subscore is 4  GCS verbal subscore is 5  GCS motor subscore is 6  Sensory: No sensory deficit  Deep Tendon Reflexes: Reflexes are normal and symmetric  Reflex Scores:       Patellar reflexes are 2+ on the right side and 2+ on the left side  Psychiatric:         Speech: Speech normal          Behavior: Behavior normal  Behavior is cooperative           Vital Signs  ED Triage Vitals [03/08/21 1936]   Temperature Pulse Respirations Blood Pressure SpO2   98 9 °F (37 2 °C) 71 16 137/63 99 %      Temp Source Heart Rate Source Patient Position - Orthostatic VS BP Location FiO2 (%)   Oral Monitor Sitting Left arm --      Pain Score       No Pain           Vitals:    03/08/21 1936   BP: 137/63   Pulse: 71   Patient Position - Orthostatic VS: Sitting         Visual Acuity      ED Medications  Medications   ketorolac (TORADOL) injection 15 mg (15 mg Intramuscular Given 3/8/21 2032)   acetaminophen (TYLENOL) tablet 650 mg (650 mg Oral Given 3/8/21 2032)       Diagnostic Studies  Results Reviewed     None                 XR foot 3+ vw left   ED Interpretation by Mariano Monterroso PA-C (03/08 2037)   No acute osseous abnormality on initial read       by Lana Bowman (03/08 2016)                 Procedures  Procedures         ED Course  ED Course as of Mar 08 2253   Mon Mar 08, 2021   2014 Patient denies current pregnancy                                              MDM  Number of Diagnoses or Management Options  Plantar fasciitis of left foot: new and does not require workup     Amount and/or Complexity of Data Reviewed  Tests in the radiology section of CPT®: ordered and reviewed  Review and summarize past medical records: yes  Independent visualization of images, tracings, or specimens: yes    Risk of Complications, Morbidity, and/or Mortality  Presenting problems: low  Diagnostic procedures: low  Management options: low    Patient Progress  Patient progress: stable     Patient is a 81-year-old female with no significant past medical or surgical history that presents emergency department with aching persistent worsening left foot pain for 2 days    Patient states that she had history of left foot pain in the past with [facility] after a walk on it after I get up in the morning "   Patient hemodynamically stable and afebrile  Left foot x-ray with no acute osseous abnormality on initial read  Patient denies current pregnancy; delivered Toradol and Tylenol patient verbalized decrease in left foot pain symptoms status post medication delivery  Very likely plantar fasciitis   Counseled patient on following up with Podiatry for possible orthopedic insert  Prescribed motrin and tylenol and counseled patient on medication administration and delivery   Follow up with PCP  Follow up with Podiatry   Follow up with the emergency department if symptoms persist or exacerbate  Patient demonstrates verbal understanding of all clinical imaging findings, discharge instructions, follow-up and verbalized agreement with current treatment plan  Disposition  Final diagnoses:   Plantar fasciitis of left foot     Time reflects when diagnosis was documented in both MDM as applicable and the Disposition within this note     Time User Action Codes Description Comment    3/8/2021  8:38 PM Hernandez Nicholson [M72 2] Plantar fasciitis of left foot       ED Disposition     ED Disposition Condition Date/Time Comment    Discharge Stable Mon Mar 8, 2021  8:39 PM Sandor Atkins discharge to home/self care  Follow-up Information     Follow up With Specialties Details Why Contact Info Additional Information    [address],  Family Medicine Call in 1 week for further evaluation of symptoms [address]  [place] [phone]       [address] Emergency Department Emergency Medicine Go to  As needed [address] [facility] [address] [facility] Emergency Department, [address], [place], [place], [address] [facility] [address] Podiatry   [address] [facility] [address]  [address] [facility], [facility] [place], [place], [address] [facility]          Discharge Medication List as of 3/8/2021  8:39 PM      START taking these medications    Details   acetaminophen (TYLENOL) 650 mg CR tablet Take 1 tablet (650 mg total) by mouth every 8 (eight) hours as needed for mild pain for up to 3 days, Starting Mon 3/8/2021, Until Thu 3/11/2021, Normal      !! ibuprofen (MOTRIN) 400 mg tablet Take 1 tablet (400 mg total) by mouth every 6 (six) hours as needed for mild pain for up to 3 days, Starting Mon 3/8/2021, Until Thu 3/11/2021, Normal       !! - Potential duplicate medications found  Please discuss with provider        CONTINUE these medications which have NOT CHANGED    Details   !! ibuprofen (MOTRIN) 400 mg tablet Take 1 tablet (400 mg total) by mouth every 6 (six) hours as needed for mild pain for up to 3 days, Starting Sat 6/27/2020, Until Mon 3/8/2021, Normal      levothyroxine 125 mcg tablet Take 1 tablet (125 mcg total) by mouth daily, Starting Mon 6/15/2020, Normal      VITAMIN D PO Take by mouth daily, Historical Med      levonorgestrel (MIRENA) 20 MCG/24HR IUD 1 each by Intrauterine route once, Historical Med       !! - Potential duplicate medications found  Please discuss with provider  No discharge procedures on file      PDMP Review       Value Time User    PDMP Reviewed  Yes 3/8/2021  7:59 PM Bonnell Libman, PA-C          ED Provider  Electronically Signed by           Bonnell Libman, PA-C  03/08/21 6977

## 2021-03-09 NOTE — DISCHARGE INSTRUCTIONS
Take Tylenol and Motrin as indicated  Follow-up with Podiatry  Follow-up with PCP  Follow-up with emergency department symptoms persist or exacerbate

## 2021-03-10 ENCOUNTER — OFFICE VISIT (OUTPATIENT)
Dept: FAMILY MEDICINE CLINIC | Facility: CLINIC | Age: 46
End: 2021-03-10
Payer: COMMERCIAL

## 2021-03-10 VITALS
RESPIRATION RATE: 16 BRPM | DIASTOLIC BLOOD PRESSURE: 80 MMHG | SYSTOLIC BLOOD PRESSURE: 130 MMHG | BODY MASS INDEX: 33.46 KG/M2 | HEIGHT: 64 IN | HEART RATE: 70 BPM | WEIGHT: 196 LBS | OXYGEN SATURATION: 98 %

## 2021-03-10 DIAGNOSIS — E03.9 ADULT HYPOTHYROIDISM: ICD-10-CM

## 2021-03-10 DIAGNOSIS — M77.32 CALCANEAL SPUR OF FOOT, LEFT: ICD-10-CM

## 2021-03-10 DIAGNOSIS — Z13.0 SCREENING FOR DEFICIENCY ANEMIA: ICD-10-CM

## 2021-03-10 DIAGNOSIS — Z11.3 SCREENING FOR STD (SEXUALLY TRANSMITTED DISEASE): ICD-10-CM

## 2021-03-10 DIAGNOSIS — E55.9 VITAMIN D DEFICIENCY: ICD-10-CM

## 2021-03-10 DIAGNOSIS — Z12.31 BREAST CANCER SCREENING BY MAMMOGRAM: ICD-10-CM

## 2021-03-10 DIAGNOSIS — Z12.4 CERVICAL CANCER SCREENING: ICD-10-CM

## 2021-03-10 DIAGNOSIS — Z13.220 SCREENING FOR LIPID DISORDERS: ICD-10-CM

## 2021-03-10 DIAGNOSIS — M72.2 PLANTAR FASCIITIS OF LEFT FOOT: ICD-10-CM

## 2021-03-10 DIAGNOSIS — Z13.1 SCREENING FOR DIABETES MELLITUS: ICD-10-CM

## 2021-03-10 DIAGNOSIS — Z00.00 ANNUAL PHYSICAL EXAM: Primary | ICD-10-CM

## 2021-03-10 PROCEDURE — 99214 OFFICE O/P EST MOD 30 MIN: CPT | Performed by: FAMILY MEDICINE

## 2021-03-10 PROCEDURE — 99396 PREV VISIT EST AGE 40-64: CPT | Performed by: FAMILY MEDICINE

## 2021-03-10 PROCEDURE — 3725F SCREEN DEPRESSION PERFORMED: CPT | Performed by: FAMILY MEDICINE

## 2021-03-10 RX ORDER — NAPROXEN 500 MG/1
500 TABLET ORAL 2 TIMES DAILY WITH MEALS
Qty: 28 TABLET | Refills: 0 | Status: SHIPPED | OUTPATIENT
Start: 2021-03-10 | End: 2021-03-29

## 2021-03-10 NOTE — PATIENT INSTRUCTIONS

## 2021-03-10 NOTE — PROGRESS NOTES
Assessment/Plan:   Diagnoses and all orders for this visit:    Adult BMI 33 0-33 9 kg/sq m  -     Ambulatory referral to Nutrition Services; Future  - BMI 33 6  - discussed diet and encouraged exercise  - educated that it takes 3500 shari to lose 1lb  - advised to cut down on carbs, 5 servings of fruits/veggies/day, increase water intake (65-80oz/water/day)   - appropriate weight loss goal for women = 0 5-1lb/week  - per the Heart Association - 150mins/exercise/week, but to lose and maintain weight 200-300mins/exercise/week     Adult hypothyroidism  -     TSH, 3rd generation with Free T4 reflex  - follows with Endo and last OV was 6/2020 (was due to have a 3wk f/u)   - no recent labs (last TSH checked 6/4/2020 = 43 700)   - currently on Levothyroxine 125mcg QAM   - will recheck labs and strongly advised to follow with Endo - pt aware     Vitamin D deficiency  -     Vitamin D 25 hydroxy; Future    Plantar fasciitis of left foot  -     naproxen (NAPROSYN) 500 mg tablet; Take 1 tablet (500 mg total) by mouth 2 (two) times a day with meals  -     Ambulatory referral to Physical Therapy; Future  Calcaneal spur of foot, left  -     Ambulatory referral to Podiatry; Future          Subjective:    Patient ID: Sanjeev Hall is a 42 y o  female    Sanjeev Hall is a 42 y o  female who presents to the office for an annual exam and ER f/u   1) Annual Exam    - PMHx: hypothyroidism, GERD, obesity (BMI 34)   - allergies: NKDA  - Meds: see med rec   - PSHx: none   - FHx: M (Depression/Anxiety/Bipolar, Drug Abuse, Thyroid Dz, Heart Dz), F (Heart Dz, EtOH Abuse), Sons x2 (ADHD)   - Immunizations: UTD with Tdap   - GYN Hx: unsure of last OV, unsure of last PAP, last Mammo was in 2017   - Hm: last Mammo 2017   - diet/exercise: started going to GYM, diet varied   - social: former smoker (quit in 2015 - 1/3PP/15yrs), denies vaping/illicits, rare EtOH   - sexual Hx: active with spouse, has Mirena IUD, interested in STD screening   - last vision: glasses, last Optho was last year  - last dental: a few years ago   2) Blepharitis of upper and lower eyelids of both eyes - resolved   3) Hypothyroidism  - follows with Endo and last OV was 6/2020 (was due to have a 3wk f/u)   - no recent labs (last TSH checked 6/4/2020 = 43 700)   - currently on Levothyroxine 125mcg QAM   - (+) cold intolerance, fatigue and memory loss   - denies thinning hair, brittle nails, constipation, dry skin   4) ER f/u   - was seen in the ER on 3/8/2021 and d/w Plantar fasciitis of left foot  - is on her feet 8-10hrs/day   - had pain in L-hip and down L-thigh which started last week (has been going to GYM) and resolved with OTC NSAIDs  - 2days ago stepped down and unable to walk 2/2 pain on L-side of foot   - had an Xray foot in ETC - "no acute osseous abnormality  There is a small calcaneal spur"       The following portions of the patient's history were reviewed and updated as appropriate: allergies, current medications, past family history, past medical history, past social history, past surgical history and problem list     Review of Systems  as per HPI    Objective:  /80   Pulse 70   Resp 16   Ht 5' 4" (1 626 m)   Wt 88 9 kg (196 lb)   SpO2 98%   BMI 33 64 kg/m²    Physical Exam  Vitals signs reviewed  Constitutional:       General: She is not in acute distress  Appearance: She is obese  She is not ill-appearing, toxic-appearing or diaphoretic  HENT:      Head: Normocephalic and atraumatic  Right Ear: External ear normal       Left Ear: External ear normal    Eyes:      General: No scleral icterus  Right eye: No discharge  Left eye: No discharge  Extraocular Movements: Extraocular movements intact  Conjunctiva/sclera: Conjunctivae normal    Neck:      Musculoskeletal: Normal range of motion and neck supple  Cardiovascular:      Rate and Rhythm: Normal rate and regular rhythm  Heart sounds: Normal heart sounds   No murmur  No friction rub  No gallop  Pulmonary:      Effort: Pulmonary effort is normal  No respiratory distress  Breath sounds: Normal breath sounds  No stridor  No wheezing, rhonchi or rales  Abdominal:      General: Bowel sounds are normal  There is no distension  Palpations: Abdomen is soft  There is no mass  Tenderness: There is no abdominal tenderness  There is no guarding or rebound  Musculoskeletal: Normal range of motion  General: Tenderness present  No swelling, deformity or signs of injury  Right lower leg: No edema  Left lower leg: No edema  Comments: (+) tenderness at lateral edge of L-foot and pain with PF/DF   Skin:     General: Skin is warm  Coloration: Skin is not pale  Findings: No erythema  Neurological:      General: No focal deficit present  Mental Status: She is alert and oriented to person, place, and time  Psychiatric:         Mood and Affect: Mood normal          Behavior: Behavior normal        BMI Counseling: Body mass index is 33 64 kg/m²  The BMI is above normal  Nutrition recommendations include decreasing overall calorie intake  Exercise recommendations include moderate aerobic physical activity for 150 minutes/week  Patient referred to nutritionist due to patient being obese  Depression Screening Follow-up Plan: Patient's depression screening was positive with a PHQ-2 score of 0  Their PHQ-9 score was   Patient assessed for underlying major depression  They have no active suicidal ideations  Brief counseling provided and recommend additional follow-up/re-evaluation next office visit

## 2021-03-10 NOTE — PROGRESS NOTES
Assessment/Plan:   Diagnoses and all orders for this visit:    Annual physical exam  - reviewed PMHx, PSHx, meds, allergies, FHx, Soc Hx and Sexual Hx  - UTD with Tdap   - discussed diet and exercise - see below   - due for screening labs - script given   - overdue for annual GYN exam, PAP and Mammo - referral and script given   - interested in STD screening - script given   - UTD with Optho   - Overdue for Dental   - RTO in 1month with labs for f/u - pt aware and agreeable     Adult BMI 33 0-33 9 kg/sq m  -     Ambulatory referral to Nutrition Services; Future  - BMI 33 6  - discussed diet and encouraged exercise  - educated that it takes 3500 shari to lose 1lb  - advised to cut down on carbs, 5 servings of fruits/veggies/day, increase water intake (65-80oz/water/day)   - appropriate weight loss goal for women = 0 5-1lb/week  - per the Heart Association - 150mins/exercise/week, but to lose and maintain weight 200-300mins/exercise/week     Screening for STD (sexually transmitted disease)  -     HIV 1/2 Antigen/Antibody (4th Generation) w Reflex [facility]; Future  -     RPR; Future  -     Hepatitis panel, acute; Future  -     Chlamydia/GC amplified DNA by PCR; Future    Adult hypothyroidism  -     TSH, 3rd generation with Free T4 reflex  - follows with Endo and last OV was 6/2020 (was due to have a 3wk f/u)   - no recent labs (last TSH checked 6/4/2020 = 43 700)   - currently on Levothyroxine 125mcg QAM   - will recheck labs and strongly advised to follow with Endo - pt aware     Vitamin D deficiency  -     Vitamin D 25 hydroxy; Future    Plantar fasciitis of left foot  -     naproxen (NAPROSYN) 500 mg tablet; Take 1 tablet (500 mg total) by mouth 2 (two) times a day with meals  -     Ambulatory referral to Physical Therapy; Future  Calcaneal spur of foot, left  -     Ambulatory referral to Podiatry; Future    Cervical cancer screening  -     Ambulatory referral to Gynecology;  Future  Breast cancer screening by mammogram  -     Mammo screening bilateral w 3d & cad; Future    Screening for lipid disorders  -     Lipid panel; Future    Screening for deficiency anemia  -     CBC and differential; Future    Screening for diabetes mellitus  -     Comprehensive metabolic panel; Future    Other orders  -     Cancel: TDAP VACCINE GREATER THAN OR EQUAL TO 8YO IM          Subjective:    Patient ID: Zuri Carbajal is a 42 y o  female  Zuri Backbone is a 42 y o  female who presents to the office for an annual exam and ER f/u   1) Annual Exam    - PMHx: hypothyroidism, GERD, obesity (BMI 34)   - allergies: NKDA  - Meds: see med rec   - PSHx: none   - FHx: M (Depression/Anxiety/Bipolar, Drug Abuse, Thyroid Dz, Heart Dz), F (Heart Dz, EtOH Abuse), Sons x2 (ADHD)   - Immunizations: UTD with Tdap   - GYN Hx: unsure of last OV, unsure of last PAP, last Mammo was in 2017   - Hm: last Mammo 2017   - diet/exercise: started going to GYM, diet varied   - social: former smoker (quit in 2015 - 1/3PP/15yrs), denies vaping/illicits, rare EtOH   - sexual Hx: active with spouse, has Mirena IUD, interested in STD screening   - last vision: glasses, last Optho was last year  - last dental: a few years ago   2) Blepharitis of upper and lower eyelids of both eyes - resolved   3) Hypothyroidism  - follows with Endo and last OV was 6/2020 (was due to have a 3wk f/u)   - no recent labs (last TSH checked 6/4/2020 = 43 700)   - currently on Levothyroxine 125mcg QAM   - (+) cold intolerance, fatigue and memory loss   - denies thinning hair, brittle nails, constipation, dry skin   4) ER f/u   - was seen in the ER on 3/8/2021 and d/w Plantar fasciitis of left foot  - is on her feet 8-10hrs/day   - had pain in L-hip and down L-thigh which started last week (has been going to GYM) and resolved with OTC NSAIDs  - 2days ago stepped down and unable to walk 2/2 pain on L-side of foot   - had an Xray foot in [facility] - "no acute osseous abnormality    There is a small calcaneal spur"       The following portions of the patient's history were reviewed and updated as appropriate: allergies, current medications, past family history, past medical history, past social history, past surgical history and problem list     Review of Systems  as per HPI    Objective:  /80   Pulse 70   Resp 16   Ht 5' 4" (1 626 m)   Wt 88 9 kg (196 lb)   SpO2 98%   BMI 33 64 kg/m²    Physical Exam  Vitals signs reviewed  Constitutional:       General: She is not in acute distress  Appearance: She is obese  She is not ill-appearing, toxic-appearing or diaphoretic  HENT:      Head: Normocephalic and atraumatic  Right Ear: External ear normal       Left Ear: External ear normal    Eyes:      General: No scleral icterus  Right eye: No discharge  Left eye: No discharge  Extraocular Movements: Extraocular movements intact  Conjunctiva/sclera: Conjunctivae normal    Neck:      Musculoskeletal: Normal range of motion and neck supple  Cardiovascular:      Rate and Rhythm: Normal rate and regular rhythm  Heart sounds: Normal heart sounds  No murmur  No friction rub  No gallop  Pulmonary:      Effort: Pulmonary effort is normal  No respiratory distress  Breath sounds: Normal breath sounds  No stridor  No wheezing, rhonchi or rales  Abdominal:      General: Bowel sounds are normal  There is no distension  Palpations: Abdomen is soft  There is no mass  Tenderness: There is no abdominal tenderness  There is no guarding or rebound  Musculoskeletal: Normal range of motion  General: Tenderness present  No swelling, deformity or signs of injury  Right lower leg: No edema  Left lower leg: No edema  Comments: (+) tenderness at lateral edge of L-foot and pain with PF/DF   Skin:     General: Skin is warm  Coloration: Skin is not pale  Findings: No erythema  Neurological:      General: No focal deficit present  Mental Status: She is alert and oriented to person, place, and time  Psychiatric:         Mood and Affect: Mood normal          Behavior: Behavior normal        BMI Counseling: Body mass index is 33 64 kg/m²  The BMI is above normal  Nutrition recommendations include decreasing overall calorie intake  Exercise recommendations include moderate aerobic physical activity for 150 minutes/week  Patient referred to nutritionist due to patient being obese  Depression Screening Follow-up Plan: Patient's depression screening was positive with a PHQ-2 score of 0  Their PHQ-9 score was   Patient assessed for underlying major depression  They have no active suicidal ideations  Brief counseling provided and recommend additional follow-up/re-evaluation next office visit

## 2021-03-17 ENCOUNTER — APPOINTMENT (OUTPATIENT)
Dept: LAB | Facility: CLINIC | Age: 46
End: 2021-03-17
Payer: COMMERCIAL

## 2021-03-17 ENCOUNTER — TRANSCRIBE ORDERS (OUTPATIENT)
Dept: LAB | Facility: CLINIC | Age: 46
End: 2021-03-17

## 2021-03-17 DIAGNOSIS — E03.9 ACQUIRED HYPOTHYROIDISM: ICD-10-CM

## 2021-03-17 DIAGNOSIS — E78.00 PURE HYPERCHOLESTEROLEMIA: ICD-10-CM

## 2021-03-17 DIAGNOSIS — E55.9 VITAMIN D DEFICIENCY: ICD-10-CM

## 2021-03-17 DIAGNOSIS — E03.9 ADULT HYPOTHYROIDISM: ICD-10-CM

## 2021-03-17 DIAGNOSIS — Z11.3 SCREENING FOR STD (SEXUALLY TRANSMITTED DISEASE): ICD-10-CM

## 2021-03-17 DIAGNOSIS — E66.9 OBESITY (BMI 30.0-34.9): ICD-10-CM

## 2021-03-17 DIAGNOSIS — Z13.1 SCREENING FOR DIABETES MELLITUS: ICD-10-CM

## 2021-03-17 DIAGNOSIS — Z13.0 SCREENING FOR DEFICIENCY ANEMIA: ICD-10-CM

## 2021-03-17 DIAGNOSIS — R63.1 EXCESSIVE THIRST: ICD-10-CM

## 2021-03-17 DIAGNOSIS — Z13.220 SCREENING FOR LIPID DISORDERS: ICD-10-CM

## 2021-03-17 LAB
25(OH)D3 SERPL-MCNC: 31.7 NG/ML (ref 30–100)
ALBUMIN SERPL BCP-MCNC: 3.8 G/DL (ref 3.5–5)
ALP SERPL-CCNC: 51 U/L (ref 46–116)
ALT SERPL W P-5'-P-CCNC: 26 U/L (ref 12–78)
ANION GAP SERPL CALCULATED.3IONS-SCNC: 3 MMOL/L (ref 4–13)
AST SERPL W P-5'-P-CCNC: 14 U/L (ref 5–45)
BASOPHILS # BLD AUTO: 0.07 THOUSANDS/ΜL (ref 0–0.1)
BASOPHILS NFR BLD AUTO: 1 % (ref 0–1)
BILIRUB SERPL-MCNC: 0.54 MG/DL (ref 0.2–1)
BUN SERPL-MCNC: 16 MG/DL (ref 5–25)
CALCIUM SERPL-MCNC: 8.7 MG/DL (ref 8.3–10.1)
CHLORIDE SERPL-SCNC: 110 MMOL/L (ref 100–108)
CHOLEST SERPL-MCNC: 167 MG/DL (ref 50–200)
CO2 SERPL-SCNC: 25 MMOL/L (ref 21–32)
CREAT SERPL-MCNC: 0.94 MG/DL (ref 0.6–1.3)
EOSINOPHIL # BLD AUTO: 0.13 THOUSAND/ΜL (ref 0–0.61)
EOSINOPHIL NFR BLD AUTO: 2 % (ref 0–6)
ERYTHROCYTE [DISTWIDTH] IN BLOOD BY AUTOMATED COUNT: 13.3 % (ref 11.6–15.1)
GFR SERPL CREATININE-BSD FRML MDRD: 74 ML/MIN/1.73SQ M
GLUCOSE P FAST SERPL-MCNC: 90 MG/DL (ref 65–99)
HAV IGM SER QL: NORMAL
HBV CORE IGM SER QL: NORMAL
HBV SURFACE AG SER QL: NORMAL
HCT VFR BLD AUTO: 40.7 % (ref 34.8–46.1)
HCV AB SER QL: NORMAL
HDLC SERPL-MCNC: 54 MG/DL
HGB BLD-MCNC: 13.1 G/DL (ref 11.5–15.4)
IMM GRANULOCYTES # BLD AUTO: 0.04 THOUSAND/UL (ref 0–0.2)
IMM GRANULOCYTES NFR BLD AUTO: 1 % (ref 0–2)
LDLC SERPL CALC-MCNC: 103 MG/DL (ref 0–100)
LYMPHOCYTES # BLD AUTO: 1.57 THOUSANDS/ΜL (ref 0.6–4.47)
LYMPHOCYTES NFR BLD AUTO: 21 % (ref 14–44)
MCH RBC QN AUTO: 28.6 PG (ref 26.8–34.3)
MCHC RBC AUTO-ENTMCNC: 32.2 G/DL (ref 31.4–37.4)
MCV RBC AUTO: 89 FL (ref 82–98)
MONOCYTES # BLD AUTO: 0.83 THOUSAND/ΜL (ref 0.17–1.22)
MONOCYTES NFR BLD AUTO: 11 % (ref 4–12)
NEUTROPHILS # BLD AUTO: 4.78 THOUSANDS/ΜL (ref 1.85–7.62)
NEUTS SEG NFR BLD AUTO: 64 % (ref 43–75)
NONHDLC SERPL-MCNC: 113 MG/DL
NRBC BLD AUTO-RTO: 0 /100 WBCS
PLATELET # BLD AUTO: 333 THOUSANDS/UL (ref 149–390)
PMV BLD AUTO: 9.6 FL (ref 8.9–12.7)
POTASSIUM SERPL-SCNC: 4.5 MMOL/L (ref 3.5–5.3)
PROT SERPL-MCNC: 7.4 G/DL (ref 6.4–8.2)
RBC # BLD AUTO: 4.58 MILLION/UL (ref 3.81–5.12)
SODIUM SERPL-SCNC: 138 MMOL/L (ref 136–145)
T4 FREE SERPL-MCNC: 1 NG/DL (ref 0.76–1.46)
TRIGL SERPL-MCNC: 49 MG/DL
TSH SERPL DL<=0.05 MIU/L-ACNC: 6.21 UIU/ML (ref 0.36–3.74)
WBC # BLD AUTO: 7.42 THOUSAND/UL (ref 4.31–10.16)

## 2021-03-17 PROCEDURE — 84443 ASSAY THYROID STIM HORMONE: CPT | Performed by: FAMILY MEDICINE

## 2021-03-17 PROCEDURE — 80061 LIPID PANEL: CPT

## 2021-03-17 PROCEDURE — 36415 COLL VENOUS BLD VENIPUNCTURE: CPT

## 2021-03-17 PROCEDURE — 80074 ACUTE HEPATITIS PANEL: CPT

## 2021-03-17 PROCEDURE — 80053 COMPREHEN METABOLIC PANEL: CPT

## 2021-03-17 PROCEDURE — 83036 HEMOGLOBIN GLYCOSYLATED A1C: CPT

## 2021-03-17 PROCEDURE — 85025 COMPLETE CBC W/AUTO DIFF WBC: CPT

## 2021-03-17 PROCEDURE — 82306 VITAMIN D 25 HYDROXY: CPT

## 2021-03-17 PROCEDURE — 87591 N.GONORRHOEAE DNA AMP PROB: CPT

## 2021-03-17 PROCEDURE — 86592 SYPHILIS TEST NON-TREP QUAL: CPT

## 2021-03-17 PROCEDURE — 84439 ASSAY OF FREE THYROXINE: CPT | Performed by: FAMILY MEDICINE

## 2021-03-17 PROCEDURE — 87389 HIV-1 AG W/HIV-1&-2 AB AG IA: CPT

## 2021-03-17 PROCEDURE — 87491 CHLMYD TRACH DNA AMP PROBE: CPT

## 2021-03-18 LAB
C TRACH DNA SPEC QL NAA+PROBE: NEGATIVE
EST. AVERAGE GLUCOSE BLD GHB EST-MCNC: 103 MG/DL
HBA1C MFR BLD: 5.2 %
HIV 1+2 AB+HIV1 P24 AG SERPL QL IA: NORMAL
N GONORRHOEA DNA SPEC QL NAA+PROBE: NEGATIVE
RPR SER QL: NORMAL

## 2021-03-19 ENCOUNTER — TELEMEDICINE (OUTPATIENT)
Dept: FAMILY MEDICINE CLINIC | Facility: CLINIC | Age: 46
End: 2021-03-19
Payer: COMMERCIAL

## 2021-03-19 VITALS — BODY MASS INDEX: 33.46 KG/M2 | WEIGHT: 196 LBS | HEIGHT: 64 IN

## 2021-03-19 DIAGNOSIS — E55.9 VITAMIN D DEFICIENCY: ICD-10-CM

## 2021-03-19 DIAGNOSIS — E03.9 ADULT HYPOTHYROIDISM: Primary | ICD-10-CM

## 2021-03-19 DIAGNOSIS — E66.9 OBESITY (BMI 30.0-34.9): ICD-10-CM

## 2021-03-19 DIAGNOSIS — E78.00 PURE HYPERCHOLESTEROLEMIA: ICD-10-CM

## 2021-03-19 PROBLEM — E66.811 OBESITY (BMI 30.0-34.9): Status: ACTIVE | Noted: 2021-03-19

## 2021-03-19 PROCEDURE — 99214 OFFICE O/P EST MOD 30 MIN: CPT | Performed by: FAMILY MEDICINE

## 2021-03-19 RX ORDER — LEVOTHYROXINE SODIUM 137 UG/1
125 TABLET ORAL DAILY
Qty: 60 TABLET | Refills: 0 | Status: SHIPPED | OUTPATIENT
Start: 2021-03-19 | End: 2021-04-07

## 2021-03-19 NOTE — PROGRESS NOTES
Virtual Regular Visit    Assessment/Plan:  Problem List Items Addressed This Visit        Endocrine    Acquired hypothyroidism - Primary    Relevant Medications    levothyroxine 137 mcg tablet  - follows with Endo and last OV was 6/2020 (was due to have a 3wk f/u)   - TSH 6 210 <-- 43 700 (6/2020)   - T4 1 00 <-- 0 66 (6/2020)    - currently on Levothyroxine 125mcg QAM -- will increase to Levothyroxine 137mcg QAM and repeat labs in 6wks   - strongly advised to follow with Endo - pt aware and agreeable       Other Visit Diagnoses     Vitamin D deficiency     - Vit D 32  - cont OTC Vit D 2000IU QD       Obesity (BMI 30 0-34 9)      - BMI 33 6   - discussed diet and encouraged exercise  - educated that it takes 3500 shari to lose 1lb  - advised to cut down on carbs, 5 servings of fruits/veggies/day, increase water intake (65-80oz/water/day)   - appropriate weight loss goal for women = 0 5-1lb/week  - per the Heart Association - 150mins/exercise/week, but to lose and maintain weight 200-300mins/exercise/week                Reason for visit is f/u of hypothyroid  Chief Complaint   Patient presents with    Virtual Regular Visit        Encounter provider Ramy Mcintosh DO  Provider located at [address] [place] [phone]      Recent Visits  No visits were found meeting these conditions  Showing recent visits within past 7 days and meeting all other requirements     Today's Visits  Date Type Provider Dept   03/19/21 Telemedicine Love Blancas DO [facility]   Showing today's visits and meeting all other requirements     Future Appointments  No visits were found meeting these conditions  Showing future appointments within next 150 days and meeting all other requirements        The patient was identified by name and date of birth   Candacedevan Tampa was informed that this is a telemedicine visit and that the visit is being conducted through [facility] and patient was informed that this is a secure, HIPAA-compliant platform  She agrees to proceed     My office door was closed  No one else was in the room  She acknowledged consent and understanding of privacy and security of the video platform  The patient has agreed to participate and understands they can discontinue the visit at any time  Patient is aware this is a billable service  Subjective  Ronal Esposito is a 42 y o  female who presents virtually for f/u of hypothyroid  HPI   1) Labs (3/17/2021)   - TSH 6 210 <-- 43 700 (6/2020)   - T4 1 00 <-- 0 66 (6/2020)   - A1c 5 2  - nml CBC, CMP, Vit D, Lipids, STD panel   2) Hypothyroidism   - follows with Endo and last OV was 6/2020 (was due to have a 3wk f/u)   - currently on Levothyroxine 125mcg QAM   - (+) thinning hair, cold intolerance, fatigue, memory loss, intermittent dry skin, constipation     - has been taking Biotin QD which helps with thinning hair  - labs as noted above     Past Medical History:   Diagnosis Date    Anxiety 4/16/2020    Hypothyroidism     Obesity        History reviewed  No pertinent surgical history  Current Outpatient Medications   Medication Sig Dispense Refill    ibuprofen (MOTRIN) 400 mg tablet Take 1 tablet (400 mg total) by mouth every 6 (six) hours as needed for mild pain for up to 3 days 12 tablet 0    levonorgestrel (MIRENA) 20 MCG/24HR IUD 1 each by Intrauterine route once      levothyroxine 137 mcg tablet Take 1 tablet (137 mcg total) by mouth daily 60 tablet 0    naproxen (NAPROSYN) 500 mg tablet Take 1 tablet (500 mg total) by mouth 2 (two) times a day with meals 28 tablet 0    VITAMIN D PO Take by mouth daily       No current facility-administered medications for this visit           No Known Allergies    Review of Systems as per HPI     Video Exam  Vitals:    03/19/21 1458   Weight: 88 9 kg (196 lb)   Height: 5' 4" (1 626 m)       Physical Exam   General: AAOx3, NAD, obese  HEENT: NC/AT, EOMI, clear conjunctiva, nml external ear and nose   Cardio: deferred  Pulm: no acute respiratory distress, able to talk in complete sentences w/o getting short of breath   Abd: deferred   Psych: nml mood/affect/behavior     BMI Counseling: Body mass index is 33 64 kg/m²  The BMI is above normal  Nutrition recommendations include reducing portion sizes  Exercise recommendations include moderate aerobic physical activity for 150 minutes/week  I spent 20 minutes directly with the patient during this visit      VIRTUAL VISIT DISCLAIMER    Margaret Chavez acknowledges that she has consented to an online visit or consultation  She understands that the online visit is based solely on information provided by her, and that, in the absence of a face-to-face physical evaluation by the physician, the diagnosis she receives is both limited and provisional in terms of accuracy and completeness  This is not intended to replace a full medical face-to-face evaluation by the physician  Margaret Chavez understands and accepts these terms

## 2021-03-23 DIAGNOSIS — Z20.822 EXPOSURE TO COVID-19 VIRUS: ICD-10-CM

## 2021-03-23 DIAGNOSIS — Z20.822 EXPOSURE TO COVID-19 VIRUS: Primary | ICD-10-CM

## 2021-03-23 LAB — SARS-COV-2 RNA RESP QL NAA+PROBE: NEGATIVE

## 2021-03-23 PROCEDURE — U0003 INFECTIOUS AGENT DETECTION BY NUCLEIC ACID (DNA OR RNA); SEVERE ACUTE RESPIRATORY SYNDROME CORONAVIRUS 2 (SARS-COV-2) (CORONAVIRUS DISEASE [COVID-19]), AMPLIFIED PROBE TECHNIQUE, MAKING USE OF HIGH THROUGHPUT TECHNOLOGIES AS DESCRIBED BY CMS-2020-01-R: HCPCS | Performed by: FAMILY MEDICINE

## 2021-03-23 PROCEDURE — U0005 INFEC AGEN DETEC AMPLI PROBE: HCPCS | Performed by: FAMILY MEDICINE

## 2021-03-24 ENCOUNTER — TELEPHONE (OUTPATIENT)
Dept: FAMILY MEDICINE CLINIC | Facility: CLINIC | Age: 46
End: 2021-03-24

## 2021-03-24 NOTE — PROGRESS NOTES
Iud removal    Date/Time: 3/25/2021 9:38 AM  Performed by: CAMILLA Alvarez  Authorized by: CAMILLA Alvarez   Universal Protocol:  Consent: Verbal consent obtained  Written consent obtained  Risks and benefits: risks, benefits and alternatives were discussed  Consent given by: patient  Time out: Immediately prior to procedure a "time out" was called to verify the correct patient, procedure, equipment, support staff and site/side marked as required  Timeout called at: 3/25/2021 9:42 AM   Patient understanding: patient states understanding of the procedure being performed  Patient consent: the patient's understanding of the procedure matches consent given  Procedure consent: procedure consent matches procedure scheduled  Relevant documents: relevant documents present and verified  Patient identity confirmed: verbally with patient      Procedure: Other reason for removal:  No IUD string noted at os  Will follow up with pelvic US  Questionable small cervical polyps noted

## 2021-03-25 ENCOUNTER — OFFICE VISIT (OUTPATIENT)
Dept: OBGYN CLINIC | Facility: CLINIC | Age: 46
End: 2021-03-25
Payer: COMMERCIAL

## 2021-03-25 VITALS
WEIGHT: 194.8 LBS | BODY MASS INDEX: 33.26 KG/M2 | DIASTOLIC BLOOD PRESSURE: 74 MMHG | SYSTOLIC BLOOD PRESSURE: 118 MMHG | HEIGHT: 64 IN

## 2021-03-25 DIAGNOSIS — T83.32XA INTRAUTERINE CONTRACEPTIVE DEVICE THREADS LOST, INITIAL ENCOUNTER: Primary | ICD-10-CM

## 2021-03-25 LAB — SL AMB POCT URINE HCG: NEGATIVE

## 2021-03-25 PROCEDURE — 99202 OFFICE O/P NEW SF 15 MIN: CPT | Performed by: NURSE PRACTITIONER

## 2021-03-25 PROCEDURE — 81025 URINE PREGNANCY TEST: CPT | Performed by: NURSE PRACTITIONER

## 2021-03-25 NOTE — PATIENT INSTRUCTIONS
Complete pelvic ultrasound  Use back up method of contraception at all times    Return to office for annual exam

## 2021-03-25 NOTE — PROGRESS NOTES
Iud removal    Date/Time: 3/25/2021 9:38 AM  Performed by: CAMILLA Riley  Authorized by: CAMILLA Riley   Universal Protocol:  Consent: Verbal consent obtained  Written consent obtained  Risks and benefits: risks, benefits and alternatives were discussed  Consent given by: patient  Time out: Immediately prior to procedure a "time out" was called to verify the correct patient, procedure, equipment, support staff and site/side marked as required  Timeout called at: 3/25/2021 9:42 AM   Patient understanding: patient states understanding of the procedure being performed  Patient consent: the patient's understanding of the procedure matches consent given  Procedure consent: procedure consent matches procedure scheduled  Relevant documents: relevant documents present and verified  Patient identity confirmed: verbally with patient      Procedure: Other reason for removal:  No IUD string noted at os  Will follow up with pelvic US  Questionable small cervical polyps noted

## 2021-03-29 NOTE — PROGRESS NOTES
Assessment/Plan:    Calcium 1000 mg + 600-1000 IU Vit D daily  Pap with high risk HPV Q 5 years-done, HPV 9 vaccine recommended through age 39  Check with your insurance for coverage  If covered, call office to schedule start of vaccine series  Annual mammogram (scheduled and ordered by PCP), monthly breast self exam  Complete pelvic US to check IUD placement  Use back up method of contraception until IUD placement is confirmed  She desires new Mirena placement  Exercise 150 minutes per week minimum  Kegels 20 times twice daily     Diagnoses and all orders for this visit:    Encntr for gyn exam (general) (routine) w/o abn findings  -     Liquid-based pap, screening    Cervical cancer screening  -     Liquid-based pap, screening    Intrauterine contraceptive device threads lost, subsequent encounter          Subjective:      Patient ID: Ronal Esposito is a 42 y o  female  Ronal Esposito is a 42 y o  female who is here today for her annual visit  (Children are 23, 21, 15, 11) No health concerns  Irregular menses x 2-3  days with light flow  Menses is acceptable  Desire IUD removal due to expiration and desires replacement of new Mirena  Ronal Esposito is sexually active with male partner/ Clary Spauldingter of 3 years; together 12 years  Exercises once per week  In office last on 3/25/2021 for IUD removal  No strings noted  To completed pelvic US for IUD localization tomorrow  She works FT at [employer]  on [address]      The following portions of the patient's history were reviewed and updated as appropriate: allergies, current medications, past family history, past medical history, past social history, past surgical history and problem list     Review of Systems   Constitutional: Negative  Negative for activity change, appetite change, chills, diaphoresis, fatigue, fever and unexpected weight change  HENT: Negative for congestion, dental problem, sneezing, sore throat and trouble swallowing      Eyes: Negative for visual disturbance  Respiratory: Negative for chest tightness and shortness of breath  Cardiovascular: Negative for chest pain and leg swelling  Gastrointestinal: Negative for abdominal pain, constipation, diarrhea, nausea and vomiting  Genitourinary: Negative for difficulty urinating, dyspareunia, dysuria, frequency, hematuria, menstrual problem, pelvic pain, urgency, vaginal bleeding, vaginal discharge and vaginal pain  Musculoskeletal: Negative for back pain and neck pain  Skin: Negative  Allergic/Immunologic: Negative  Neurological: Negative for weakness and headaches  Hematological: Negative for adenopathy  Psychiatric/Behavioral: Negative  Objective:      /62   Ht 5' 3 75" (1 619 m)   Wt 87 5 kg (193 lb)   LMP 03/01/2021   Breastfeeding No   BMI 33 39 kg/m²          Physical Exam  Vitals signs and nursing note reviewed  Constitutional:       Appearance: Normal appearance  She is well-developed  She is obese  HENT:      Head: Normocephalic and atraumatic  Eyes:      General:         Right eye: No discharge  Left eye: No discharge  Neck:      Musculoskeletal: Normal range of motion and neck supple  Thyroid: No thyromegaly  Trachea: Trachea normal    Cardiovascular:      Rate and Rhythm: Normal rate and regular rhythm  Heart sounds: Normal heart sounds  Pulmonary:      Effort: Pulmonary effort is normal       Breath sounds: Normal breath sounds  Chest:      Breasts: Breasts are symmetrical          Right: Normal  No inverted nipple, mass, nipple discharge, skin change or tenderness  Left: Normal  No inverted nipple, mass, nipple discharge, skin change or tenderness  Abdominal:      Palpations: Abdomen is soft  Genitourinary:     General: Normal vulva  Exam position: Lithotomy position  Labia:         Right: No rash, tenderness, lesion or injury  Left: No rash, tenderness, lesion or injury         Urethra: No prolapse, urethral pain, urethral swelling or urethral lesion  Vagina: No signs of injury and foreign body  Bleeding (light menstrual flow) present  No vaginal discharge, erythema or tenderness  Cervix: No cervical motion tenderness, discharge or friability  Uterus: Normal        Adnexa: Right adnexa normal and left adnexa normal         Right: No mass, tenderness or fullness  Left: No mass, tenderness or fullness  Rectum: No external hemorrhoid  Comments: No IUD string noted at os  Musculoskeletal: Normal range of motion  Lymphadenopathy:      Head:      Right side of head: No submental, submandibular or tonsillar adenopathy  Left side of head: No submental, submandibular or tonsillar adenopathy  Cervical: No cervical adenopathy  Upper Body:      Right upper body: No supraclavicular or axillary adenopathy  Left upper body: No supraclavicular or axillary adenopathy  Lower Body: No right inguinal adenopathy  No left inguinal adenopathy  Skin:     General: Skin is warm and dry  Neurological:      Mental Status: She is alert and oriented to person, place, and time  Psychiatric:         Mood and Affect: Mood normal          Behavior: Behavior normal          Thought Content:  Thought content normal          Judgment: Judgment normal

## 2021-03-29 NOTE — TELEPHONE ENCOUNTER
[facility] called for a refill of Naproxen I did call patient and she advised she is not taking it she threw it away she did not like the way it made her feel

## 2021-03-30 ENCOUNTER — ANNUAL EXAM (OUTPATIENT)
Dept: OBGYN CLINIC | Facility: CLINIC | Age: 46
End: 2021-03-30
Payer: COMMERCIAL

## 2021-03-30 VITALS
DIASTOLIC BLOOD PRESSURE: 62 MMHG | SYSTOLIC BLOOD PRESSURE: 110 MMHG | BODY MASS INDEX: 32.95 KG/M2 | HEIGHT: 64 IN | HEART RATE: 82 BPM | WEIGHT: 193 LBS

## 2021-03-30 DIAGNOSIS — Z01.419 ENCNTR FOR GYN EXAM (GENERAL) (ROUTINE) W/O ABN FINDINGS: Primary | ICD-10-CM

## 2021-03-30 DIAGNOSIS — T83.32XD INTRAUTERINE CONTRACEPTIVE DEVICE THREADS LOST, SUBSEQUENT ENCOUNTER: ICD-10-CM

## 2021-03-30 DIAGNOSIS — Z12.4 CERVICAL CANCER SCREENING: ICD-10-CM

## 2021-03-30 PROCEDURE — 3008F BODY MASS INDEX DOCD: CPT | Performed by: NURSE PRACTITIONER

## 2021-03-30 PROCEDURE — G0145 SCR C/V CYTO,THINLAYER,RESCR: HCPCS | Performed by: NURSE PRACTITIONER

## 2021-03-30 PROCEDURE — PNV: Performed by: NURSE PRACTITIONER

## 2021-03-30 PROCEDURE — 87624 HPV HI-RISK TYP POOLED RSLT: CPT | Performed by: NURSE PRACTITIONER

## 2021-03-30 PROCEDURE — 1036F TOBACCO NON-USER: CPT | Performed by: NURSE PRACTITIONER

## 2021-03-30 NOTE — PATIENT INSTRUCTIONS
Calcium 1000 mg + 600-1000 IU Vit D daily  Pap with high risk HPV Q 5 years-done, HPV 9 vaccine recommended through age 39  Check with your insurance for coverage  If covered, call office to schedule start of vaccine series  Annual mammogram (scheduled and ordered by PCP), monthly breast self exam  Complete pelvic US to check IUD placement  Use back up method of contraception until IUD placement is confirmed  She desires new Mirena placement  Exercise 150 minutes per week minimum   Kegels 20 times twice daily

## 2021-03-31 DIAGNOSIS — Z23 ENCOUNTER FOR IMMUNIZATION: ICD-10-CM

## 2021-04-01 LAB
HPV HR 12 DNA CVX QL NAA+PROBE: NEGATIVE
HPV16 DNA CVX QL NAA+PROBE: NEGATIVE
HPV18 DNA CVX QL NAA+PROBE: NEGATIVE

## 2021-04-07 ENCOUNTER — APPOINTMENT (OUTPATIENT)
Dept: LAB | Facility: CLINIC | Age: 46
End: 2021-04-07
Payer: COMMERCIAL

## 2021-04-07 ENCOUNTER — OFFICE VISIT (OUTPATIENT)
Dept: FAMILY MEDICINE CLINIC | Facility: CLINIC | Age: 46
End: 2021-04-07
Payer: COMMERCIAL

## 2021-04-07 VITALS
DIASTOLIC BLOOD PRESSURE: 78 MMHG | OXYGEN SATURATION: 100 % | WEIGHT: 195 LBS | HEIGHT: 64 IN | HEART RATE: 71 BPM | RESPIRATION RATE: 16 BRPM | SYSTOLIC BLOOD PRESSURE: 118 MMHG | BODY MASS INDEX: 33.29 KG/M2

## 2021-04-07 DIAGNOSIS — E03.9 ADULT HYPOTHYROIDISM: Primary | ICD-10-CM

## 2021-04-07 LAB
T4 FREE SERPL-MCNC: 0.98 NG/DL (ref 0.76–1.46)
TSH SERPL DL<=0.05 MIU/L-ACNC: 7.64 UIU/ML (ref 0.36–3.74)

## 2021-04-07 PROCEDURE — 84439 ASSAY OF FREE THYROXINE: CPT | Performed by: FAMILY MEDICINE

## 2021-04-07 PROCEDURE — 84443 ASSAY THYROID STIM HORMONE: CPT | Performed by: FAMILY MEDICINE

## 2021-04-07 PROCEDURE — 3008F BODY MASS INDEX DOCD: CPT | Performed by: OBSTETRICS & GYNECOLOGY

## 2021-04-07 PROCEDURE — 99214 OFFICE O/P EST MOD 30 MIN: CPT | Performed by: FAMILY MEDICINE

## 2021-04-07 PROCEDURE — 36415 COLL VENOUS BLD VENIPUNCTURE: CPT | Performed by: FAMILY MEDICINE

## 2021-04-07 RX ORDER — LEVOTHYROXINE SODIUM 0.15 MG/1
150 TABLET ORAL DAILY
Qty: 40 TABLET | Refills: 0 | Status: SHIPPED | OUTPATIENT
Start: 2021-04-07 | End: 2021-07-30 | Stop reason: SDUPTHER

## 2021-04-07 NOTE — PROGRESS NOTES
Assessment/Plan:   Diagnoses and all orders for this visit:    Adult hypothyroidism  -     TSH, 3rd generation with Free T4 reflex  -     Ambulatory referral to Endocrinology; Future  -     T4, free  -     levothyroxine 150 mcg tablet; Take 1 tablet (150 mcg total) by mouth daily  -     TSH, 3rd generation with Free T4 reflex; Future  - TSH (4/7/2021): 7 640 <-- 6 210 (3/17/2021)<-- 43 700 (6/2020)   - T4 pending   - (+) cold intolerance, fatigue, memory loss (minor things), intermittent dry skin, intermittent constipation     - will increase Levothyroxine from 137mcg QAM to 150mcg QAM and repeat labs in 4wks   - re-referred to Endo and strongly advised to schedule appt - pt aware and agreeable     Adult BMI 33 0-33 9 kg/sq m  -     Ambulatory referral to Nutrition Services; Future  -     Ambulatory referral to Weight Management; Future  - BMI 33 7  - discussed diet and encouraged exercise  - educated that it takes 3500 shari to lose 1lb  - advised to cut down on carbs, 5 servings of fruits/veggies/day, increase water intake (65-80oz/water/day)   - appropriate weight loss goal for women = 0 5-1lb/week  - per the Heart Association - 150mins/exercise/week, but to lose and maintain weight 200-300mins/exercise/week   - t/c referral to [address] [place] [address] [facility] at next OV           Subjective:    Patient ID: Hernan Grimm is a 42 y o  female    HPI   39yo F presents to the office for f/u of hypothyroidism   - follows with Endo and last OV was 6/2020 (was due to have a 3wk f/u) - referral given   - currently on Levothyroxine 137mcg QAM - was increased at last OV 4wks ago and pt due for repeat labs (pending)   - TSH (4/7/2021): 7 640 <-- 6 210 (3/17/2021)<-- 43 700 (6/2020)   - (+) cold intolerance, fatigue, memory loss (minor things), intermittent dry skin, intermittent constipation     - has been taking Biotin QD which helps with thinning hair  - has been taking Vit D 2000IU QD   - has been eating more fish and chicken (vs steak/pork as its causing her to "feel sick")       The following portions of the patient's history were reviewed and updated as appropriate: allergies, current medications, past family history, past medical history, past social history, past surgical history and problem list     Review of Systems  as per HPI    Objective:  /78 (BP Location: Left arm, Patient Position: Sitting, Cuff Size: Standard)   Pulse 71   Resp 16   Ht 5' 3 75" (1 619 m)   Wt 88 5 kg (195 lb)   SpO2 100%   BMI 33 73 kg/m²    Physical Exam  Constitutional:       General: She is not in acute distress  Appearance: She is obese  She is not ill-appearing, toxic-appearing or diaphoretic  HENT:      Head: Normocephalic and atraumatic  Right Ear: External ear normal       Left Ear: External ear normal    Eyes:      General: No scleral icterus  Right eye: No discharge  Left eye: No discharge  Extraocular Movements: Extraocular movements intact  Conjunctiva/sclera: Conjunctivae normal    Neck:      Musculoskeletal: Normal range of motion  Thyroid: No thyromegaly or thyroid tenderness  Cardiovascular:      Rate and Rhythm: Normal rate and regular rhythm  Heart sounds: No murmur  No friction rub  No gallop  Abdominal:      General: Bowel sounds are normal  There is no distension  Palpations: Abdomen is soft  There is no mass  Tenderness: There is no abdominal tenderness  There is no guarding or rebound  Musculoskeletal: Normal range of motion  General: No swelling, tenderness, deformity or signs of injury  Right lower leg: No edema  Left lower leg: No edema  Skin:     General: Skin is warm  Neurological:      General: No focal deficit present  Mental Status: She is alert and oriented to person, place, and time  Psychiatric:         Mood and Affect: Mood normal          Behavior: Behavior normal        BMI Counseling: Body mass index is 33 73 kg/m²  The BMI is above normal  Nutrition recommendations include reducing portion sizes and decreasing overall calorie intake  Exercise recommendations include moderate aerobic physical activity for 150 minutes/week, exercising 3-5 times per week, joining a gym and strength training exercises  Patient referred to nutritionist due to patient being obese  Patient referred to weight management due to patient being obese

## 2021-04-08 ENCOUNTER — HOSPITAL ENCOUNTER (OUTPATIENT)
Dept: ULTRASOUND IMAGING | Facility: HOSPITAL | Age: 46
Discharge: HOME | End: 2021-04-08
Payer: COMMERCIAL

## 2021-04-08 DIAGNOSIS — T83.32XA INTRAUTERINE CONTRACEPTIVE DEVICE THREADS LOST, INITIAL ENCOUNTER: ICD-10-CM

## 2021-04-08 LAB
LAB AP GYN PRIMARY INTERPRETATION: NORMAL
Lab: NORMAL

## 2021-04-08 PROCEDURE — 76830 TRANSVAGINAL US NON-OB: CPT

## 2021-04-08 PROCEDURE — 76856 US EXAM PELVIC COMPLETE: CPT

## 2021-04-14 ENCOUNTER — TELEPHONE (OUTPATIENT)
Dept: OBGYN CLINIC | Facility: CLINIC | Age: 46
End: 2021-04-14

## 2021-04-14 DIAGNOSIS — Z20.822 EXPOSURE TO COVID-19 VIRUS: Primary | ICD-10-CM

## 2021-04-14 NOTE — TELEPHONE ENCOUNTER
Patient states she was suppose to get phone call regarding appt or surgery  I did review provider note but was not sure if she wanted her schedule for pre -op, advised I will message Cindy and we will call her tomorrow  Verbalized understanding  Routing to provider for advice

## 2021-04-15 DIAGNOSIS — Z20.822 EXPOSURE TO COVID-19 VIRUS: ICD-10-CM

## 2021-04-15 LAB — SARS-COV-2 RNA RESP QL NAA+PROBE: NEGATIVE

## 2021-04-15 PROCEDURE — U0003 INFECTIOUS AGENT DETECTION BY NUCLEIC ACID (DNA OR RNA); SEVERE ACUTE RESPIRATORY SYNDROME CORONAVIRUS 2 (SARS-COV-2) (CORONAVIRUS DISEASE [COVID-19]), AMPLIFIED PROBE TECHNIQUE, MAKING USE OF HIGH THROUGHPUT TECHNOLOGIES AS DESCRIBED BY CMS-2020-01-R: HCPCS | Performed by: FAMILY MEDICINE

## 2021-04-15 PROCEDURE — U0005 INFEC AGEN DETEC AMPLI PROBE: HCPCS | Performed by: FAMILY MEDICINE

## 2021-04-15 NOTE — TELEPHONE ENCOUNTER
I tried calling her after I spoke with Dr Viviane Ferrara today  No answer  I left a message and forwarded a message to Miranda Middleton in case she returns my call here  I left a note in her chart about her options

## 2021-04-16 ENCOUNTER — OFFICE VISIT (OUTPATIENT)
Dept: PHYSICAL THERAPY | Facility: CLINIC | Age: 46
End: 2021-04-16
Payer: COMMERCIAL

## 2021-04-16 DIAGNOSIS — M25.552 LEFT HIP PAIN: Primary | ICD-10-CM

## 2021-04-16 PROCEDURE — 97162 PT EVAL MOD COMPLEX 30 MIN: CPT | Performed by: PHYSICAL THERAPIST

## 2021-04-16 PROCEDURE — 97112 NEUROMUSCULAR REEDUCATION: CPT | Performed by: PHYSICAL THERAPIST

## 2021-04-16 NOTE — TELEPHONE ENCOUNTER
Patient stopped by the office (because she was at PT) she scheduled a consult with Dr Benita Chandra for Monday 4/19   Interested in having the IUD removed during the tubal

## 2021-04-16 NOTE — PROGRESS NOTES
PT Evaluation  Direct Access    Namita Teixeira has been non-compliant with attending physical therapy  I have personally attempted to contact this patient 3 times to schedule further appointments unsuccessfully  She is scheduled for an unrelated surgery and will therefore not be able to continue skilled PT  She will be discharged at this time  I would be happy to see this patient in the future should she need further physical therapy  Today's date: 2021  Patient name: Namita Teixeira  : 1975  MRN: [id number]  Referring provider: Terra Cotter, PT  Dx:   Encounter Diagnosis     ICD-10-CM    1  Left hip pain  M25 552        Start Time: 1015  Stop Time: 1100  Total time in clinic (min): 45 minutes    Assessment  Assessment details: Namita Teixeira is a 42 y o  female was evaluated on 2021 under Direct Access for signs and symptoms consistent with Left hip pain  (primary encounter diagnosis)  Patient did have L foot pain initially but her L hip has become the primary concern  L foot pain has nearly resolved  Namita Teiexira has the above listed impairments and will benefit from skilled PT to improve deficits to return to prior level of function  Under direct access, the patient can be treated for 30 days without a prescription from the physician  Please do not hesitate to contact me at [phone]  Thank you for allowing me to participate in Namita Teixeira care  No further referral appears necessary at this time based upon examination results  Primary movement impairment diagnosis of Hip weakness resulting in pathoanatomical symptoms of left hip pain  (primary encounter diagnosis) and limiting her ability to care for self, dress independently, drive, exercise or recreation, get off the toilet, get out of a chair, lift, perform household chores, perform yard work, sit, sleep and squat to  objects from the floor      Impairments include:  1) Krishna hip weakness  2) L ITB tightness  3) Decreased control of hip abd/ER    Etiologic factors include none recalled by the patient  Discussed risks, benefits, and alternatives to treatment, and answered all patient questions to patient satisfaction  Impairments: abnormal gait, abnormal muscle firing, abnormal muscle tone, abnormal or restricted ROM, abnormal movement, impaired balance, impaired physical strength, lacks appropriate home exercise program, pain with function and poor posture   Understanding of Dx/Px/POC: good   Prognosis: good  Prognosis details: Positive prognostic indicators include positive attitude toward recovery, good understanding of diagnosis and treatment plan options and absence of observed red flags  Negative prognostic indicators include chronicity of symptoms, high symptom irritability and multiple concurrent orthopedic problems  Goals  Impairment Goals 4-6 weeks  - Decrease pain to <3/10  - Improve hip AROM to equal to the unaffected lower extremity  - Increase knee strength to 4+/5 throughout  - Increase hip strength to 4+/5 throughout    Functional Goals 6-8 weeks  - Return to Prior Level of Function  - Increase Functional Status Measure (FOTO) to: 78  - Patient will be independent with HEP  - Patient will be able to squat without increased pain/compensation/difficulty  - Patient will be able to perform sit to stand without increased pain/compensation/difficulty   - Patient will be able to ascend and descend stairs without increased pain/compensation/difficulty   - Patient will be able to walk without difficulty      Plan  Plan details: Prognosis above is given PT services 2x/week tapering to 1x/week over the next 2 months and home program adherence    Patient would benefit from: skilled physical therapy  Planned modality interventions: cryotherapy, electrical stimulation/Russian stimulation, high voltage pulsed current: pain management, high voltage pulsed current: spasm management, H-Wave, TENS, thermotherapy: hydrocollator packs and unattended electrical stimulation  Planned therapy interventions: abdominal trunk stabilization, behavior modification, body mechanics training, breathing training, flexibility, functional ROM exercises, home exercise program, joint mobilization, manual therapy, massage, Nieves taping, muscle pump exercises, neuromuscular re-education, patient education, postural training, strengthening, stretching, therapeutic activities, therapeutic exercise, therapeutic training, gait training, transfer training and balance  Frequency: 2x week  Duration in weeks: 8  Treatment plan discussed with: patient        Subjective Evaluation    History of Present Illness  Mechanism of injury: WORK/SCHOOL: 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  HOME LIFE: 4 boys at home, 3 sets of stairs  HOBBIES/EXERCISE: Gym  PLOF:  No back pain, no injuries  HISTORY OF CURRENT INJURY:  Patient had a lot of pain in the arch of her L foot and it swelled up more than normal and she had a lot of pain  She went to the ER, and was diagnosed with plantar fasciitis  She followed up with PCP who recommended PT  She had hip pain when she went to the ER but this was dismissed  Hip pain has been getting worse, and the foot pain has gotten better with inserts   PAIN LOCATION/DESCRIPTORS: Pinching pain located in L lateral hip, burning pain at times and also sharp     AGGRAVATING FACTORS:  Crossing her ankles, lifting L leg to put on pants, crossing her legs, prolonged sitting, getting out of a chair, driving the truck, squatting, laying on the L hip, worse with adduction of L hip  EASES: Pillow between knees when sleeping, movement, heat  DAY PATTERN: Worst in the AM, after inactivity  IMAGING:  None of hip, x-ray of foot showed small spur  SPECIAL QUESTIONS:    [place]iel [place] denies a new onset of Bladder incontinence, Bowel dysfunction, Recent unexplained weight loss, Clumsy or unsteadiness, Constant night pain and History of cancer  PATIENT GOALS: Patient wishes to be able to sit and stand without pain  Patient wishes to get back to the gym  Pain  Current pain ratin  At best pain ratin  At worst pain ratin  Quality: discomfort, burning, sharp and tight  Progression: worsening    Patient Goals  Patient goals for therapy: decreased pain, increased motion, increased strength, independence with ADLs/IADLs and return to sport/leisure activities          Objective     Lumbar Screen  Lumbar range of motion within normal limits with the following exceptions: Active Range of Motion   Left Hip   Flexion: WFL  Adduction: 10 degrees with pain  External rotation (90/90): 20 degrees with pain  Internal rotation (90/90): 40 degrees     Right Hip   Flexion: WFL  Extension: WFL  Abduction: WFL  Adduction: WFL  External rotation (90/90): 37 degrees   Internal rotation (90/90): 40 degrees     Strength/Myotome Testing     Left Hip   Planes of Motion   Flexion: 4- (pain)  Extension: 4+  Abduction: 4+  External rotation: 4+  Internal rotation: 4 (pain)    Right Hip   Planes of Motion   Flexion: 5  Extension: 4  Abduction: 4  External rotation: 5  Internal rotation: 5    Additional Strength Details  Knee strength 5/5 bilaterally    Tests     Left Hip   Positive DONNAANGELIR, modified Rocky and Rocky  Negative J sign and scour  90/90 SLR: Negative  Right Hip   Negative DONNA, FADIR, Rocky and scour  90/90 SLR: Negative      Additional Tests Details  Asterisks sign with ITB S in L hip    No pain with adduction and extension, asterisks pain with adduction and flexion stretch    TTP of GT and ITB proximally        General Comments:      Hip Comments   Sit to stand: less weight in L leg  Squat: Less weight in L leg  Ambulation: Slight L lateral shift, L trendelenberg               Diagnosis: L hip pain, GTPS   Precautions: L plantar fasciitis   Primary Goals: Krishna hip strength, ITB length, neuro   *asterisks by exercise = given for HEP   Manuals 4/16       IASTM L ITB                                        There Ex        Bike        ITB S* 3x30 sec       Hip flexor S        Hip IR S        Piriformis S                                        Neuro Re-Ed        Clamshells* 2x10       X-walks* YTB 1 lap       Bridge with hip abd        4 way SLR        Cup taps        Star drill        Lateral step downs                                                 Re-evaluation              Ther Act             Sit to stand band around knees                           Modalities

## 2021-04-17 ENCOUNTER — IMMUNIZATIONS (OUTPATIENT)
Dept: FAMILY MEDICINE CLINIC | Facility: HOSPITAL | Age: 46
End: 2021-04-17

## 2021-04-17 DIAGNOSIS — Z23 ENCOUNTER FOR IMMUNIZATION: Primary | ICD-10-CM

## 2021-04-17 PROCEDURE — 91300 SARS-COV-2 / COVID-19 MRNA VACCINE (PFIZER-BIONTECH) 30 MCG: CPT

## 2021-04-17 PROCEDURE — 0001A SARS-COV-2 / COVID-19 MRNA VACCINE (PFIZER-BIONTECH) 30 MCG: CPT

## 2021-04-19 ENCOUNTER — OFFICE VISIT (OUTPATIENT)
Dept: OBGYN CLINIC | Facility: CLINIC | Age: 46
End: 2021-04-19
Payer: COMMERCIAL

## 2021-04-19 ENCOUNTER — PREP FOR PROCEDURE (OUTPATIENT)
Dept: OBGYN CLINIC | Facility: CLINIC | Age: 46
End: 2021-04-19

## 2021-04-19 ENCOUNTER — APPOINTMENT (OUTPATIENT)
Dept: PHYSICAL THERAPY | Facility: CLINIC | Age: 46
End: 2021-04-19
Payer: COMMERCIAL

## 2021-04-19 VITALS — BODY MASS INDEX: 33.56 KG/M2 | DIASTOLIC BLOOD PRESSURE: 76 MMHG | SYSTOLIC BLOOD PRESSURE: 110 MMHG | WEIGHT: 194 LBS

## 2021-04-19 DIAGNOSIS — Z30.2 ENCOUNTER FOR STERILIZATION: ICD-10-CM

## 2021-04-19 DIAGNOSIS — N83.202 CYST OF LEFT OVARY: ICD-10-CM

## 2021-04-19 DIAGNOSIS — T83.32XD INTRAUTERINE CONTRACEPTIVE DEVICE THREADS LOST, SUBSEQUENT ENCOUNTER: Primary | ICD-10-CM

## 2021-04-19 DIAGNOSIS — Z30.09 CONSULTATION FOR STERILIZATION: ICD-10-CM

## 2021-04-19 PROCEDURE — 99214 OFFICE O/P EST MOD 30 MIN: CPT | Performed by: OBSTETRICS & GYNECOLOGY

## 2021-04-19 PROCEDURE — 1036F TOBACCO NON-USER: CPT | Performed by: OBSTETRICS & GYNECOLOGY

## 2021-04-19 NOTE — H&P (VIEW-ONLY)
Assessment Cassondra Severin was seen today for consult and ovarian cyst     Diagnoses and all orders for this visit:    Intrauterine contraceptive device threads lost, subsequent encounter    Consultation for sterilization         Plan   Will proceed to OR for hysteroscopy, IUD removal, diagnostic laparoscopy, bilateral salpingectomy, possible ovarian cystectomy  Please see separate documentation of consent and surgical H&P        Subjective     Santi Knutson is a 42 y o  female here for a problem visit  Patient has had her IUD for approximately 9 years and came in recently for removal  IUD threads not visible on exam but IUD was present on pelvic US  US also notable for 2cm cyst  The patient discussed IUD removal in the office vs OR with the previous provider and she desires OR removal with sterilization  She does not desire future childbearing and would like to have sterilization performed at the time of IUD removal       Patient Active Problem List   Diagnosis    Acid reflux    Acquired hypothyroidism    Varicose veins of right lower extremity with edema    Varicose veins of right lower extremity with pain    Blepharitis of upper and lower eyelids of both eyes    Former smoker    Anxiety    Pure hypercholesterolemia    Obesity (BMI 30 0-34  9)         Gynecologic History  No LMP recorded  (Menstrual status: Birth Control)    Contraception: IUD    Obstetric History  OB History    Para Term  AB Living   5 4 3 1 1 4   SAB TAB Ectopic Multiple Live Births     1     4      # Outcome Date GA Lbr Hemanth/2nd Weight Sex Delivery Anes PTL Lv   5 Term 01/09/10 42w0d   M Vag-Spont   SHAUNA   4 Term 05 40w0d   M Vag-Spont   SHAUNA   3 TAB 2000           2 Term 97 41w0d   M Vag-Spont   SHAUNA   1  95 34w3d   M Vag-Spont   SHAUNA      Obstetric Comments   Menarche: 17       Past Medical/Surgical/Family/Social History  The following portions of the patient's history were reviewed and updated as appropriate: allergies, current medications, past family history, past medical history, past social history, past surgical history and problem list     Allergies  Patient has no known allergies      Medications    Current Outpatient Medications:     levonorgestrel (MIRENA) 20 MCG/24HR IUD, 1 each by Intrauterine route once, Disp: , Rfl:     levothyroxine 150 mcg tablet, Take 1 tablet (150 mcg total) by mouth daily, Disp: 40 tablet, Rfl: 0    VITAMIN D PO, Take by mouth daily, Disp: , Rfl:       Review of Systems  Constitutional: no fever, feels well, no tiredness, no recent weight gain or loss  Breasts:no complaints of breast pain, breast lump, or nipple discharge  Gastrointestinal: no complaints of abdominal pain, constipation, nausea, vomiting, or diarrhea or bloody stools  Genitourinary : no complaints of dysuria, incontinence, pelvic pain, dysmenorrhea,vaginal discharge or abnormal vaginal bleeding       Objective     /76   Wt 88 kg (194 lb)   Breastfeeding No   BMI 33 56 kg/m²     General: alert and oriented, in no acute distress  Heart: regular rate and rhythm, S1, S2 normal, no murmur, click, rub or gallop  Lungs: clear to auscultation bilaterally  MSK: Normal gait  Skin: warm, dry  Neuro: no focal deficits  Psych: normal affect and judgement, cooperative

## 2021-04-19 NOTE — PROGRESS NOTES
Assessment Fahad Bauer was seen today for consult and ovarian cyst     Diagnoses and all orders for this visit:    Intrauterine contraceptive device threads lost, subsequent encounter    Consultation for sterilization         Plan    Gus Echols is scheduled for hysteroscopy, IUD removal, diagnostic laparoscopy, bilateral salpingectomy, possible cystectomy on 5/3/21  Pre-op instructions, including showering with Hibiclens, discussed with patient and patient received paper copy  The risks, benefits and alternatives to the procedure were discussed  We discussed the risks of pain, bleeding, blood clot, infection, allergic reaction, neurovascular injury, injury to uterus, injury to surrounding structures such as bowel, bladder and/or ureters, and possibility of inability to complete the procedure  We discussed code status - full code  Blood transfusion is acceptable  We discussed resident physician participation in the procedure, including pelvic exam   All questions answered, consent obtained  Subjective     Gus Echols is a 42 y o  female here for a pre-op consultation  She has missing IUD threads and prefers OR removal of her IUD  She desires permanent sterilization  Please see previous documentation by me of counseling  Patient Active Problem List   Diagnosis    Acid reflux    Acquired hypothyroidism    Varicose veins of right lower extremity with edema    Varicose veins of right lower extremity with pain    Blepharitis of upper and lower eyelids of both eyes    Former smoker    Anxiety    Pure hypercholesterolemia    Obesity (BMI 30 0-34  9)         Gynecologic History  No LMP recorded  (Menstrual status: Birth Control)    Contraception: IUD    Obstetric History  OB History    Para Term  AB Living   5 4 3 1 1 4   SAB TAB Ectopic Multiple Live Births     1     4      # Outcome Date GA Lbr Hemanth/2nd Weight Sex Delivery Anes PTL Lv   5 Term 01/09/10 42w0d   M Vag-Spont   SHAUNA 4 Term 05 40w0d   M Vag-Spont   SHAUNA   3 TAB 2000           2 Term 97 41w0d   M Vag-Spont   SHAUNA   1  95 34w3d   M Vag-Spont   SHAUNA      Obstetric Comments   Menarche: 17       Past Medical/Surgical/Family/Social History    Past Medical History:   Diagnosis Date    Anxiety 2020    Disease of thyroid gland 2017    History of ovarian cyst     Hypothyroidism     Obesity      Past Surgical History:   Procedure Laterality Date    NO PAST SURGERIES       Family History   Problem Relation Age of Onset    Depression Mother     Drug abuse Mother     Anxiety disorder Mother         anxiety and depression    Heart disease Mother         dx in 19's    Thyroid disease Mother     Bipolar disorder Mother     Heart failure Mother     Heart disease Father         dx in 39's    Alcohol abuse Father     Heart attack Father     Alzheimer's disease Maternal Grandmother     Alcohol abuse Maternal Grandfather     Diabetes Maternal Grandfather         Alcohol related    No Known Problems Sister     No Known Problems Brother     Allergies Son     ADD / ADHD Son     Bipolar disorder Son     ADD / ADHD Son     Breast cancer Neg Hx     Cervical cancer Neg Hx     Colon cancer Neg Hx     Ovarian cancer Neg Hx     Uterine cancer Neg Hx      Social History     Socioeconomic History    Marital status: /Civil Union     Spouse name: Not on file    Number of children: 3    Years of education: Not on file    Highest education level: Not on file   Occupational History    Occupation:    Social Needs    Financial resource strain: Not on file    Food insecurity     Worry: Not on file     Inability: Not on file    Transportation needs     Medical: Not on file     Non-medical: Not on file   Tobacco Use    Smoking status: Former Smoker     Packs/day: 0 25     Years: 5 00     Pack years: 1 25     Types: Cigarettes     Quit date: 2014     Years since quittin 3    Smokeless tobacco: Former User   Substance and Sexual Activity    Alcohol use: Yes     Frequency: 2-4 times a month     Comment: social    Drug use: Never    Sexual activity: Yes     Partners: Male     Birth control/protection: I U D  Lifestyle    Physical activity     Days per week: Not on file     Minutes per session: Not on file    Stress: Not on file   Relationships    Social connections     Talks on phone: Not on file     Gets together: Not on file     Attends Temple service: Not on file     Active member of club or organization: Not on file     Attends meetings of clubs or organizations: Not on file     Relationship status: Not on file    Intimate partner violence     Fear of current or ex partner: Not on file     Emotionally abused: Not on file     Physically abused: Not on file     Forced sexual activity: Not on file   Other Topics Concern    Not on file   Social History Narrative    Drinks 4 cups of coffee daily          Patient has no known allergies  Current Outpatient Medications:     levonorgestrel (MIRENA) 20 MCG/24HR IUD, 1 each by Intrauterine route once, Disp: , Rfl:     levothyroxine 150 mcg tablet, Take 1 tablet (150 mcg total) by mouth daily, Disp: 40 tablet, Rfl: 0    VITAMIN D PO, Take by mouth daily, Disp: , Rfl:       Review of Systems  Constitutional: no fever, feels well, no tiredness, no recent weight gain or loss  Gastrointestinal: no complaints of abdominal pain, constipation, nausea, vomiting, or diarrhea or bloody stools  Genitourinary : no complaints of dysuria, incontinence, pelvic pain, dysmenorrhea,vaginal discharge or abnormal vaginal bleeding       Objective     /76   Wt 88 kg (194 lb)   Breastfeeding No   BMI 33 56 kg/m²     GEN: The patient was alert and oriented x3, pleasant well-appearing female in no acute distress     CV: Regular rate and rhythm  PULM: nonlabored respirations, CTAB  : deferred  MSK: Normal gait  Skin: warm, dry  Neuro: no focal deficits  Psych: normal affect and judgement, cooperative

## 2021-04-19 NOTE — PROGRESS NOTES
Assessment Rosalee Martin was seen today for consult and ovarian cyst     Diagnoses and all orders for this visit:    Intrauterine contraceptive device threads lost, subsequent encounter    Consultation for sterilization         Plan   Will proceed to OR for hysteroscopy, IUD removal, diagnostic laparoscopy, bilateral salpingectomy, possible ovarian cystectomy  Please see separate documentation of consent and surgical H&P        Subjective     Allyn Spatz is a 42 y o  female here for a problem visit  Patient has had her IUD for approximately 9 years and came in recently for removal  IUD threads not visible on exam but IUD was present on pelvic US  US also notable for 2cm cyst  The patient discussed IUD removal in the office vs OR with the previous provider and she desires OR removal with sterilization  She does not desire future childbearing and would like to have sterilization performed at the time of IUD removal       Patient Active Problem List   Diagnosis    Acid reflux    Acquired hypothyroidism    Varicose veins of right lower extremity with edema    Varicose veins of right lower extremity with pain    Blepharitis of upper and lower eyelids of both eyes    Former smoker    Anxiety    Pure hypercholesterolemia    Obesity (BMI 30 0-34  9)         Gynecologic History  No LMP recorded  (Menstrual status: Birth Control)    Contraception: IUD    Obstetric History  OB History    Para Term  AB Living   5 4 3 1 1 4   SAB TAB Ectopic Multiple Live Births     1     4      # Outcome Date GA Lbr Hemanth/2nd Weight Sex Delivery Anes PTL Lv   5 Term 01/09/10 42w0d   M Vag-Spont   SHAUNA   4 Term 05 40w0d   M Vag-Spont   SHAUNA   3 TAB 2000           2 Term 97 41w0d   M Vag-Spont   SHAUNA   1  95 34w3d   M Vag-Spont   SHAUNA      Obstetric Comments   Menarche: 17       Past Medical/Surgical/Family/Social History  The following portions of the patient's history were reviewed and updated as appropriate: allergies, current medications, past family history, past medical history, past social history, past surgical history and problem list     Allergies  Patient has no known allergies      Medications    Current Outpatient Medications:     levonorgestrel (MIRENA) 20 MCG/24HR IUD, 1 each by Intrauterine route once, Disp: , Rfl:     levothyroxine 150 mcg tablet, Take 1 tablet (150 mcg total) by mouth daily, Disp: 40 tablet, Rfl: 0    VITAMIN D PO, Take by mouth daily, Disp: , Rfl:       Review of Systems  Constitutional: no fever, feels well, no tiredness, no recent weight gain or loss  Breasts:no complaints of breast pain, breast lump, or nipple discharge  Gastrointestinal: no complaints of abdominal pain, constipation, nausea, vomiting, or diarrhea or bloody stools  Genitourinary : no complaints of dysuria, incontinence, pelvic pain, dysmenorrhea,vaginal discharge or abnormal vaginal bleeding       Objective     /76   Wt 88 kg (194 lb)   Breastfeeding No   BMI 33 56 kg/m²     General: alert and oriented, in no acute distress  Heart: regular rate and rhythm, S1, S2 normal, no murmur, click, rub or gallop  Lungs: clear to auscultation bilaterally  MSK: Normal gait  Skin: warm, dry  Neuro: no focal deficits  Psych: normal affect and judgement, cooperative

## 2021-04-21 ENCOUNTER — ANESTHESIA EVENT (OUTPATIENT)
Dept: PERIOP | Facility: AMBULARY SURGERY CENTER | Age: 46
End: 2021-04-21
Payer: COMMERCIAL

## 2021-04-28 ENCOUNTER — APPOINTMENT (OUTPATIENT)
Dept: PHYSICAL THERAPY | Facility: CLINIC | Age: 46
End: 2021-04-28
Payer: COMMERCIAL

## 2021-04-28 ENCOUNTER — TELEPHONE (OUTPATIENT)
Dept: OBGYN CLINIC | Facility: CLINIC | Age: 46
End: 2021-04-28

## 2021-04-28 NOTE — PRE-PROCEDURE INSTRUCTIONS
Pre-Surgery Instructions:   Medication Instructions    levonorgestrel (MIRENA) 20 MCG/24HR IUD Internal    levothyroxine 150 mcg tablet Instructed to take per normal schedule including DOS with sips water    VITAMIN D PO Instructed patient per Anesthesia Guidelines  Pre op instructions per My Surgical Experience booklet,medications per anesthesia guidelines and showering instructions per [facility] protocol reviewed-Patient has CHG  Pt  Verbalized understanding of current visitor restrictions  Instructed to avoid all ASA/NSAIDs and OTC Vit/Supp from now until after surgery  Tylenol ok prn  Pt  Verbalized an understanding of all instructions reviewed and offers no concerns at this time

## 2021-04-28 NOTE — TELEPHONE ENCOUNTER
Called pts insurance to see if pt needs prior auth for surgery on 05/03/21   Using codes 17293 (Hysteroscopy), 90895 (IUD Removal), 98905 (B/L salpingectomy), 56952 (Cystectomy) and 88585 (Diag   Lap)        PA REQUIRED   Clinicals faxed and reviewed on 04/26/21     Received Philmore Frankel # [id number]

## 2021-04-29 ENCOUNTER — TELEPHONE (OUTPATIENT)
Dept: BARIATRICS | Facility: CLINIC | Age: 46
End: 2021-04-29

## 2021-04-29 ENCOUNTER — TELEPHONE (OUTPATIENT)
Dept: OBGYN CLINIC | Facility: CLINIC | Age: 46
End: 2021-04-29

## 2021-04-29 NOTE — TELEPHONE ENCOUNTER
Pt LM stating she needs FMLA filled out for her surgery  RC and spoke with patient  Advised patient to drop off her FMLA form at the   Pt also requests surgery start time  Advised patient her surgery is scheduled for 8:30 am on 5/3 but PAT will call her the evening before to tell her what time she should arrive  Advised patient they typically have you arrive 1 hour before surgery time  Verbalized understanding

## 2021-04-30 ENCOUNTER — APPOINTMENT (OUTPATIENT)
Dept: LAB | Facility: CLINIC | Age: 46
End: 2021-04-30
Payer: COMMERCIAL

## 2021-04-30 DIAGNOSIS — Z30.2 ENCOUNTER FOR STERILIZATION: ICD-10-CM

## 2021-04-30 LAB
ERYTHROCYTE [DISTWIDTH] IN BLOOD BY AUTOMATED COUNT: 13.3 % (ref 11.6–15.1)
HCT VFR BLD AUTO: 39.6 % (ref 34.8–46.1)
HGB BLD-MCNC: 13.1 G/DL (ref 11.5–15.4)
MCH RBC QN AUTO: 28.6 PG (ref 26.8–34.3)
MCHC RBC AUTO-ENTMCNC: 33.1 G/DL (ref 31.4–37.4)
MCV RBC AUTO: 87 FL (ref 82–98)
PLATELET # BLD AUTO: 359 THOUSANDS/UL (ref 149–390)
PMV BLD AUTO: 9.1 FL (ref 8.9–12.7)
RBC # BLD AUTO: 4.58 MILLION/UL (ref 3.81–5.12)
WBC # BLD AUTO: 11.96 THOUSAND/UL (ref 4.31–10.16)

## 2021-04-30 PROCEDURE — 36415 COLL VENOUS BLD VENIPUNCTURE: CPT

## 2021-04-30 PROCEDURE — 85027 COMPLETE CBC AUTOMATED: CPT

## 2021-05-03 ENCOUNTER — HOSPITAL ENCOUNTER (OUTPATIENT)
Facility: AMBULARY SURGERY CENTER | Age: 46
Setting detail: OUTPATIENT SURGERY
Discharge: HOME | End: 2021-05-03
Attending: OBSTETRICS & GYNECOLOGY | Admitting: OBSTETRICS & GYNECOLOGY
Payer: COMMERCIAL

## 2021-05-03 ENCOUNTER — ANESTHESIA (OUTPATIENT)
Dept: PERIOP | Facility: AMBULARY SURGERY CENTER | Age: 46
End: 2021-05-03
Payer: COMMERCIAL

## 2021-05-03 VITALS
HEART RATE: 63 BPM | HEIGHT: 64 IN | TEMPERATURE: 98.1 F | DIASTOLIC BLOOD PRESSURE: 57 MMHG | OXYGEN SATURATION: 99 % | RESPIRATION RATE: 18 BRPM | WEIGHT: 197 LBS | SYSTOLIC BLOOD PRESSURE: 94 MMHG | BODY MASS INDEX: 33.63 KG/M2

## 2021-05-03 DIAGNOSIS — Z30.2 ENCOUNTER FOR STERILIZATION: ICD-10-CM

## 2021-05-03 DIAGNOSIS — T83.32XD INTRAUTERINE CONTRACEPTIVE DEVICE THREADS LOST, SUBSEQUENT ENCOUNTER: ICD-10-CM

## 2021-05-03 DIAGNOSIS — N83.202 CYST OF LEFT OVARY: ICD-10-CM

## 2021-05-03 PROBLEM — T83.32XA IUD THREADS LOST: Status: RESOLVED | Noted: 2021-05-03 | Resolved: 2021-05-03

## 2021-05-03 PROBLEM — T83.32XA IUD THREADS LOST: Status: ACTIVE | Noted: 2021-05-03

## 2021-05-03 LAB
EXT PREGNANCY TEST URINE: NEGATIVE
EXT. CONTROL: NORMAL

## 2021-05-03 PROCEDURE — 88305 TISSUE EXAM BY PATHOLOGIST: CPT | Performed by: PATHOLOGY

## 2021-05-03 PROCEDURE — 58301 REMOVE INTRAUTERINE DEVICE: CPT | Performed by: OBSTETRICS & GYNECOLOGY

## 2021-05-03 PROCEDURE — 88302 TISSUE EXAM BY PATHOLOGIST: CPT | Performed by: PATHOLOGY

## 2021-05-03 PROCEDURE — 81025 URINE PREGNANCY TEST: CPT | Performed by: OBSTETRICS & GYNECOLOGY

## 2021-05-03 PROCEDURE — 58662 LAPAROSCOPY EXCISE LESIONS: CPT | Performed by: OBSTETRICS & GYNECOLOGY

## 2021-05-03 PROCEDURE — 58661 LAPAROSCOPY REMOVE ADNEXA: CPT | Performed by: OBSTETRICS & GYNECOLOGY

## 2021-05-03 RX ORDER — LIDOCAINE HYDROCHLORIDE 10 MG/ML
INJECTION, SOLUTION EPIDURAL; INFILTRATION; INTRACAUDAL; PERINEURAL AS NEEDED
Status: DISCONTINUED | OUTPATIENT
Start: 2021-05-03 | End: 2021-05-03

## 2021-05-03 RX ORDER — HYDROMORPHONE HCL/PF 1 MG/ML
SYRINGE (ML) INJECTION AS NEEDED
Status: DISCONTINUED | OUTPATIENT
Start: 2021-05-03 | End: 2021-05-03

## 2021-05-03 RX ORDER — PROPOFOL 10 MG/ML
INJECTION, EMULSION INTRAVENOUS AS NEEDED
Status: DISCONTINUED | OUTPATIENT
Start: 2021-05-03 | End: 2021-05-03

## 2021-05-03 RX ORDER — DOCUSATE SODIUM 100 MG/1
100 CAPSULE, LIQUID FILLED ORAL 2 TIMES DAILY PRN
Qty: 30 CAPSULE | Refills: 0 | Status: SHIPPED | OUTPATIENT
Start: 2021-05-03 | End: 2021-09-02

## 2021-05-03 RX ORDER — ROCURONIUM BROMIDE 10 MG/ML
INJECTION, SOLUTION INTRAVENOUS AS NEEDED
Status: DISCONTINUED | OUTPATIENT
Start: 2021-05-03 | End: 2021-05-03

## 2021-05-03 RX ORDER — FENTANYL CITRATE/PF 50 MCG/ML
25 SYRINGE (ML) INJECTION
Status: DISCONTINUED | OUTPATIENT
Start: 2021-05-03 | End: 2021-05-03 | Stop reason: HOSPADM

## 2021-05-03 RX ORDER — IBUPROFEN 600 MG/1
600 TABLET ORAL EVERY 6 HOURS PRN
Qty: 30 TABLET | Refills: 0 | Status: SHIPPED | OUTPATIENT
Start: 2021-05-03 | End: 2021-05-07

## 2021-05-03 RX ORDER — ONDANSETRON 2 MG/ML
4 INJECTION INTRAMUSCULAR; INTRAVENOUS EVERY 6 HOURS PRN
Status: DISCONTINUED | OUTPATIENT
Start: 2021-05-03 | End: 2021-05-03 | Stop reason: HOSPADM

## 2021-05-03 RX ORDER — OXYCODONE HYDROCHLORIDE 5 MG/1
10 TABLET ORAL EVERY 4 HOURS PRN
Status: DISCONTINUED | OUTPATIENT
Start: 2021-05-03 | End: 2021-05-03 | Stop reason: HOSPADM

## 2021-05-03 RX ORDER — ONDANSETRON 2 MG/ML
4 INJECTION INTRAMUSCULAR; INTRAVENOUS ONCE AS NEEDED
Status: COMPLETED | OUTPATIENT
Start: 2021-05-03 | End: 2021-05-03

## 2021-05-03 RX ORDER — GLYCOPYRROLATE 0.2 MG/ML
INJECTION INTRAMUSCULAR; INTRAVENOUS AS NEEDED
Status: DISCONTINUED | OUTPATIENT
Start: 2021-05-03 | End: 2021-05-03

## 2021-05-03 RX ORDER — DEXAMETHASONE SODIUM PHOSPHATE 4 MG/ML
INJECTION, SOLUTION INTRA-ARTICULAR; INTRALESIONAL; INTRAMUSCULAR; INTRAVENOUS; SOFT TISSUE AS NEEDED
Status: DISCONTINUED | OUTPATIENT
Start: 2021-05-03 | End: 2021-05-03

## 2021-05-03 RX ORDER — OXYCODONE HYDROCHLORIDE 5 MG/1
5 TABLET ORAL EVERY 4 HOURS PRN
Status: DISCONTINUED | OUTPATIENT
Start: 2021-05-03 | End: 2021-05-03 | Stop reason: HOSPADM

## 2021-05-03 RX ORDER — OXYCODONE HYDROCHLORIDE AND ACETAMINOPHEN 5; 325 MG/1; MG/1
1 TABLET ORAL EVERY 4 HOURS PRN
Qty: 10 TABLET | Refills: 0 | Status: SHIPPED | OUTPATIENT
Start: 2021-05-03 | End: 2021-05-13

## 2021-05-03 RX ORDER — NEOSTIGMINE METHYLSULFATE 1 MG/ML
INJECTION INTRAVENOUS AS NEEDED
Status: DISCONTINUED | OUTPATIENT
Start: 2021-05-03 | End: 2021-05-03

## 2021-05-03 RX ORDER — ONDANSETRON 2 MG/ML
INJECTION INTRAMUSCULAR; INTRAVENOUS AS NEEDED
Status: DISCONTINUED | OUTPATIENT
Start: 2021-05-03 | End: 2021-05-03

## 2021-05-03 RX ORDER — FENTANYL CITRATE 50 UG/ML
INJECTION, SOLUTION INTRAMUSCULAR; INTRAVENOUS AS NEEDED
Status: DISCONTINUED | OUTPATIENT
Start: 2021-05-03 | End: 2021-05-03

## 2021-05-03 RX ORDER — MAGNESIUM HYDROXIDE 1200 MG/15ML
LIQUID ORAL AS NEEDED
Status: DISCONTINUED | OUTPATIENT
Start: 2021-05-03 | End: 2021-05-03 | Stop reason: HOSPADM

## 2021-05-03 RX ORDER — SODIUM CHLORIDE, SODIUM LACTATE, POTASSIUM CHLORIDE, CALCIUM CHLORIDE 600; 310; 30; 20 MG/100ML; MG/100ML; MG/100ML; MG/100ML
INJECTION, SOLUTION INTRAVENOUS CONTINUOUS PRN
Status: DISCONTINUED | OUTPATIENT
Start: 2021-05-03 | End: 2021-05-03

## 2021-05-03 RX ORDER — KETOROLAC TROMETHAMINE 30 MG/ML
INJECTION, SOLUTION INTRAMUSCULAR; INTRAVENOUS AS NEEDED
Status: DISCONTINUED | OUTPATIENT
Start: 2021-05-03 | End: 2021-05-03

## 2021-05-03 RX ORDER — MIDAZOLAM HYDROCHLORIDE 2 MG/2ML
INJECTION, SOLUTION INTRAMUSCULAR; INTRAVENOUS AS NEEDED
Status: DISCONTINUED | OUTPATIENT
Start: 2021-05-03 | End: 2021-05-03

## 2021-05-03 RX ORDER — ACETAMINOPHEN 325 MG/1
650 TABLET ORAL EVERY 6 HOURS PRN
Status: DISCONTINUED | OUTPATIENT
Start: 2021-05-03 | End: 2021-05-03 | Stop reason: HOSPADM

## 2021-05-03 RX ORDER — IBUPROFEN 600 MG/1
600 TABLET ORAL EVERY 6 HOURS PRN
Status: DISCONTINUED | OUTPATIENT
Start: 2021-05-03 | End: 2021-05-03 | Stop reason: HOSPADM

## 2021-05-03 RX ORDER — BUPIVACAINE HYDROCHLORIDE 2.5 MG/ML
INJECTION, SOLUTION EPIDURAL; INFILTRATION; INTRACAUDAL AS NEEDED
Status: DISCONTINUED | OUTPATIENT
Start: 2021-05-03 | End: 2021-05-03 | Stop reason: HOSPADM

## 2021-05-03 RX ORDER — HYDROMORPHONE HCL/PF 1 MG/ML
0.2 SYRINGE (ML) INJECTION
Status: DISCONTINUED | OUTPATIENT
Start: 2021-05-03 | End: 2021-05-03 | Stop reason: HOSPADM

## 2021-05-03 RX ORDER — PROPOFOL 10 MG/ML
INJECTION, EMULSION INTRAVENOUS CONTINUOUS PRN
Status: DISCONTINUED | OUTPATIENT
Start: 2021-05-03 | End: 2021-05-03

## 2021-05-03 RX ADMIN — ROCURONIUM BROMIDE 10 MG: 10 INJECTION, SOLUTION INTRAVENOUS at 09:14

## 2021-05-03 RX ADMIN — ROCURONIUM BROMIDE 10 MG: 10 INJECTION, SOLUTION INTRAVENOUS at 09:26

## 2021-05-03 RX ADMIN — ONDANSETRON 4 MG: 2 INJECTION INTRAMUSCULAR; INTRAVENOUS at 08:33

## 2021-05-03 RX ADMIN — PROPOFOL 200 MG: 10 INJECTION, EMULSION INTRAVENOUS at 08:34

## 2021-05-03 RX ADMIN — FENTANYL CITRATE 50 MCG: 50 INJECTION, SOLUTION INTRAMUSCULAR; INTRAVENOUS at 08:33

## 2021-05-03 RX ADMIN — ROCURONIUM BROMIDE 40 MG: 10 INJECTION, SOLUTION INTRAVENOUS at 08:35

## 2021-05-03 RX ADMIN — MIDAZOLAM HYDROCHLORIDE 2 MG: 1 INJECTION, SOLUTION INTRAMUSCULAR; INTRAVENOUS at 08:30

## 2021-05-03 RX ADMIN — HYDROMORPHONE HYDROCHLORIDE 0.25 MG: 1 INJECTION, SOLUTION INTRAMUSCULAR; INTRAVENOUS; SUBCUTANEOUS at 09:54

## 2021-05-03 RX ADMIN — NEOSTIGMINE METHYLSULFATE 4 MG: 1 INJECTION, SOLUTION INTRAMUSCULAR; INTRAVENOUS; SUBCUTANEOUS at 09:54

## 2021-05-03 RX ADMIN — DEXAMETHASONE SODIUM PHOSPHATE 4 MG: 4 INJECTION INTRA-ARTICULAR; INTRALESIONAL; INTRAMUSCULAR; INTRAVENOUS; SOFT TISSUE at 08:33

## 2021-05-03 RX ADMIN — LIDOCAINE HYDROCHLORIDE 50 MG: 10 INJECTION, SOLUTION EPIDURAL; INFILTRATION; INTRACAUDAL at 08:33

## 2021-05-03 RX ADMIN — GLYCOPYRROLATE 0.6 MG: 0.2 INJECTION, SOLUTION INTRAMUSCULAR; INTRAVENOUS at 09:54

## 2021-05-03 RX ADMIN — SODIUM CHLORIDE, SODIUM LACTATE, POTASSIUM CHLORIDE, AND CALCIUM CHLORIDE: .6; .31; .03; .02 INJECTION, SOLUTION INTRAVENOUS at 08:13

## 2021-05-03 RX ADMIN — ONDANSETRON 4 MG: 2 INJECTION INTRAMUSCULAR; INTRAVENOUS at 10:36

## 2021-05-03 RX ADMIN — HYDROMORPHONE HYDROCHLORIDE 0.25 MG: 1 INJECTION, SOLUTION INTRAMUSCULAR; INTRAVENOUS; SUBCUTANEOUS at 10:04

## 2021-05-03 RX ADMIN — FENTANYL CITRATE 50 MCG: 50 INJECTION, SOLUTION INTRAMUSCULAR; INTRAVENOUS at 08:57

## 2021-05-03 RX ADMIN — KETOROLAC TROMETHAMINE 30 MG: 30 INJECTION, SOLUTION INTRAMUSCULAR at 09:50

## 2021-05-03 RX ADMIN — SODIUM CHLORIDE, SODIUM LACTATE, POTASSIUM CHLORIDE, AND CALCIUM CHLORIDE: .6; .31; .03; .02 INJECTION, SOLUTION INTRAVENOUS at 09:18

## 2021-05-03 RX ADMIN — PROPOFOL 120 MCG/KG/MIN: 10 INJECTION, EMULSION INTRAVENOUS at 08:35

## 2021-05-03 NOTE — ANESTHESIA POSTPROCEDURE EVALUATION
Post-Op Assessment Note    CV Status:  Stable  Pain Score: 0    Pain management: adequate     Mental Status:  Alert and awake   Hydration Status:  Euvolemic   PONV Controlled:  Controlled   Airway Patency:  Patent      Post Op Vitals Reviewed: Yes      Staff: CRNA         No complications documented      BP   109/59   Temp      Pulse  82   Resp   14   SpO2  95%

## 2021-05-03 NOTE — OP NOTE
OPERATIVE REPORT  PATIENT NAME: Hernan Grimm    :  1975  MRN: [id number]  Pt Location: [facility]    SURGERY DATE: 5/3/2021    Surgeon(s) and Role:     * Eder Molina DO - Primary     * Rosalba Valentine MD - Assisting    Preop Diagnosis:  Intrauterine contraceptive device threads lost, subsequent encounter [T83 32XD]  Encounter for sterilization [Z30 2]  Cyst of left ovary [N83 202]    Post-Op Diagnosis Codes:     * Intrauterine contraceptive device threads lost, subsequent encounter [T83 32XD]     * Encounter for sterilization [Z30 2]     * dermoid cyst of right ovary [N83 202]    Procedure(s) (LRB):  REMOVAL OF INTRAUTERINE DEVICE (IUD) (N/A)  SALPINGECTOMY, LAPAROSCOPIC (Bilateral)  RIGHT LAPAROSCOPIC OVARIAN CYSTECTOMY (Right)  LAPAROSCOPY DIAGNOSTIC (N/A)    Specimen(s):  ID Type Source Tests Collected by Time Destination   1 : BILATERAL FALLOPIAN TUBES  Tissue Fallopian Tubes, Bilateral TISSUE EXAM Eder Molina DO 5/3/2021 0908    2 : RIGHT OVARIAN CYST Tissue Ovary, Cyst TISSUE EXAM Eder Molina DO 5/3/2021 3533        Estimated Blood Loss:   25 mL    Drains:  [REMOVED] Urethral Catheter Non-latex 16 Fr   (Removed)   Number of days: 0       Anesthesia Type:   General    Operative Indications:  Intrauterine contraceptive device threads lost, subsequent encounter [T83 32XD]  Encounter for sterilization [Z30 2]  Cyst of left ovary [N83 202]    Operative Findings:  Grossly normal appearing anteverted uterus with normal consistency and size  Grossly normal appearing bilateral fallopian tubes  2cm left ovarian corpus luteum cyst  4cm right dermoid cyst  Grossly normal appearing liver edge and gallbladder edge  Grossly normal appearing stomach edge  No evidence of adhesion or endometriosis  IUD without strings removed intact    Complications:   None    Procedure and Technique:    Description of Procedure    Patient was taken to the operating room were a time out was performed to confirm correct patient and correct procedure  General endotracheal anesthesia (GET) was administered and the patient was positioned on the OR table in the dorsal lithotomy position  All pressure points were padded and a beth hugger was placed to maintain control of core body temperature  A bimanual exam was performed and the uterus was noted to be anteverted, normal in size and consistency with no palpable adnexal masses or fullness  The patient was prepped and draped in the usual sterile fashion with chloroprep on the abdomen and chloroprep on the vagina and perineum  Operative Technique    A ruiz was introduced into the bladder  A weight speculum was inserted into the vagina and used to visualize the anterior lip of the cervix, which was then grasped with a single toothed tenaculum  The cervix was dilated with waldo dilator to a Western Mary 16  The IUD was removed intact with a polyp forceps  A cone uterine manipulator was inserted into the cervix and secured to the tenaculum  The speculum was removed from the vagina  Sterile gloves were then exchanged and attention was turned to the abdomen  A 5mm incision was made at the inferior edge of the umbilicus for introduction of a 5mm trocar  Trocar was introduced under direct visualization  Pneumoperitoneum was then established to a maximum of 15mmHg  The entire abdomen and pelvis was inspected and there was no evidence of injury to bowel, bladder, vasculature, or other structures  Attention was then turned to the pelvis  Patient was placed in Trendelenburg and the uterus was elevated to visualize the fallopian tubes  There was noted to be the above findings  Two additional port sites were selected in the left and right lower abdomen approximately 2cm superior and medial to the iliac crests  A 5mm incision was made for introduction of a 5mm trocar under direct visualization at each site   A blunt probe was inserted through this port and used to visualize the fimbriated ends of the tubes  The left corpus luteum was drained using the Enseal device  Good hemostasis was noted  The left fallopian tube was grasped at its fimbriated end with a blunt grasper and elevated to visualize the mesosalpinx  The Enseal device was used to ligate along the mesosalpinx, working proximally and taking care to avoid ovarian vasculature  Approximately 2cm from the cornua, the Enseal was used to amputate fallopian tube  This was then withdrawn from the abdominal cavity and sent for pathology  Attention was then turned to the right dermoid cyst  The monopolar laparoscopic scissors was used to score along the dermoid cyst edge  However, the content was spilled upon scoring the edge  The blunt grasper and [place] grasper were used to remove the cyst wall from the ovary  Good hemostasis was noted  The dermoid cyst and its content was placed in a 5 laparoscopic bag and removed through the left port site  Attention was then turned to the contralateral tube, which was amputated in similar fashion  Good hemostasis was confirmed following salpingectomy  Multiple irrigation and suction was used to wash the abdominal cavity of any debris from the dermoid cyst     Following salpingectomy and right ovarian cystectomy, pneumoperitoneum was allowed to escape  Adequate hemostasis was visualized  The inferior trocars were removed under direct visualization  The laparoscope was withdrawn from the abdomen, followed by its trocar sleeve at the umbilicus  Skin incisions were closed with 4-0 monocryl and exofin  Attention was turned to the vagina  Bivalved speculum was reinserted into the vagina and the uterine manipulator was withdrawn  The ruiz was removed  Single toothed tenaculum was removed from the anterior lip of the cervix  The ring forcep was placed on the anterior lip of the cervix for hemostasis  Good hemostasis was confirmed at the tenaculum puncture sites   Speculum was then removed from the vagina  At the conclusion of the procedure, all needle, sponge, and instrument counts were noted to be correct x2  Patient tolerated the procedure well and was transferred to PACU in stable condition prior to discharge with follow up in 1-2 weeks  Dr Hayley Kimball was present and participated in all key portions of the case      Patient Disposition:  PACU     SIGNATURE: Seema Bates MD  DATE: May 3, 2021  TIME: 10:18 AM

## 2021-05-03 NOTE — ANESTHESIA PREPROCEDURE EVALUATION
Procedure:  HYSTEROSCOPY (N/A Uterus)  REMOVAL OF INTRAUTERINE DEVICE (IUD) (N/A Uterus)  SALPINGECTOMY, LAPAROSCOPIC (Bilateral Pelvis)  POSSIBLE LEFT LAPAROSCOPIC OVARIAN CYSTECTOMY (Left Pelvis)  LAPAROSCOPY DIAGNOSTIC (N/A Abdomen)    Relevant Problems   ENDO   (+) Acquired hypothyroidism      GI/HEPATIC   (+) Acid reflux      NEURO/PSYCH   (+) Anxiety      Other   (+) Former smoker   (+) Obesity (BMI 30 0-34 9)      Upreg negative    Physical Exam    Airway    Mallampati score: II  TM Distance: >3 FB  Neck ROM: full     Dental   No notable dental hx     Cardiovascular  Cardiovascular exam normal    Pulmonary  Pulmonary exam normal     Other Findings        Anesthesia Plan  ASA Score- 2     Anesthesia Type- general with ASA Monitors  Additional Monitors:   Airway Plan: ETT  Plan Factors-Exercise tolerance (METS): >4 METS  Chart reviewed  Patient summary reviewed  Patient is not a current smoker  Patient did not smoke on day of surgery  Induction- intravenous and rapid sequence induction  Postoperative Plan- Plan for postoperative opioid use  Planned trial extubation    Informed Consent- Anesthetic plan and risks discussed with patient  I personally reviewed this patient with the CRNA  Discussed and agreed on the Anesthesia Plan with the CRNA  Deanna Rashid

## 2021-05-03 NOTE — INTERVAL H&P NOTE
H&P reviewed  After examining the patient I find no changes in the patients condition since the H&P had been written      Vitals:    05/03/21 0743   BP: 120/76   Pulse: 74   Resp: 18   Temp: 97 6 °F (36 4 °C)   SpO2: (!) 18%

## 2021-05-03 NOTE — DISCHARGE INSTRUCTIONS
Salpingectomy   WHAT YOU NEED TO KNOW:   A salpingectomy is surgery to remove one or both of your fallopian tubes  The fallopian tubes carry eggs from the ovaries to the uterus  They are part of a woman's reproductive system  A salpingectomy may be done to treat an ectopic pregnancy, cancer, endometriosis, or an infection  It may also be done to prevent pregnancy or some types of cancer  DISCHARGE INSTRUCTIONS:   Call 911 for any of the following:   · You feel lightheaded, short of breath, and have chest pain  · You cough up blood  · You have trouble breathing  Seek care immediately if:   · Your arm or leg feels warm, tender, and painful  It may look swollen and red  · Blood soaks through your bandage  · Your stitches come apart  · You soak through 1 sanitary pad in 1 hour  · You have trouble urinating or cannot urinate at all  Contact your healthcare provider if:   · You have a fever or chills  · Your wound is red, swollen, or draining pus  · You have pus or a foul-smelling odor coming from your vagina  · Your pain does not get better after you take your medicine  · You have nausea or are vomiting  · Your skin is itchy, swollen, or you have a rash  · You have questions or concerns about your condition or care  Medicines: You may need any of the following:  · Prescription pain medicine  may be given  Ask your healthcare provider how to take this medicine safely  · NSAIDs , such as ibuprofen, help decrease swelling, pain, and fever  NSAIDs can cause stomach bleeding or kidney problems in certain people  If you take blood thinner medicine, always ask your healthcare provider if NSAIDs are safe for you  Always read the medicine label and follow directions  · Take your medicine as directed  Contact your healthcare provider if you think your medicine is not helping or if you have side effects  Tell him or her if you are allergic to any medicine   Keep a list of the medicines, vitamins, and herbs you take  Include the amounts, and when and why you take them  Bring the list or the pill bottles to follow-up visits  Carry your medicine list with you in case of an emergency  Care for your wound as directed:  Ask your healthcare provider when your wound can get wet  Do not take a bath until your healthcare provider says it is okay  Take a shower only  Carefully wash around the wound with soap and water  Let the soap and water gently run over your incision  Do not  scrub your incision  Dry the area and put on new, clean bandages as directed  Change your bandages when they get wet or dirty  If you have strips of medical tape, let them fall off on their own  Activity:  Ask your healthcare provider when you can return to your normal activities  Do not douche, use tampons, or have sex until your healthcare provider says it is okay  These activities may cause infection  Do not exercise or lift anything heavy until your healthcare provider says it is okay  This may put too much stress on your incision  Follow up with your healthcare provider as directed:  Write down your questions so you remember to ask them during your visits  © Copyright [address] [place] [address] Drive Information is for End User's use only and may not be sold, redistributed or otherwise used for commercial purposes  All illustrations and images included in CareNotes® are the copyrighted property of [employer]  or Adrien Vazquez  The above information is an  only  It is not intended as medical advice for individual conditions or treatments  Talk to your doctor, nurse or pharmacist before following any medical regimen to see if it is safe and effective for you

## 2021-05-07 DIAGNOSIS — Z30.2 ENCOUNTER FOR STERILIZATION: ICD-10-CM

## 2021-05-07 RX ORDER — IBUPROFEN 600 MG/1
TABLET ORAL
Qty: 30 TABLET | Refills: 0 | Status: SHIPPED | OUTPATIENT
Start: 2021-05-07 | End: 2021-05-13

## 2021-05-09 ENCOUNTER — IMMUNIZATIONS (OUTPATIENT)
Dept: FAMILY MEDICINE CLINIC | Facility: HOSPITAL | Age: 46
End: 2021-05-09

## 2021-05-09 DIAGNOSIS — Z23 ENCOUNTER FOR IMMUNIZATION: Primary | ICD-10-CM

## 2021-05-09 PROCEDURE — 0002A SARS-COV-2 / COVID-19 MRNA VACCINE (PFIZER-BIONTECH) 30 MCG: CPT

## 2021-05-09 PROCEDURE — 91300 SARS-COV-2 / COVID-19 MRNA VACCINE (PFIZER-BIONTECH) 30 MCG: CPT

## 2021-05-13 DIAGNOSIS — Z30.2 ENCOUNTER FOR STERILIZATION: ICD-10-CM

## 2021-05-13 RX ORDER — IBUPROFEN 600 MG/1
TABLET ORAL
Qty: 30 TABLET | Refills: 0 | Status: SHIPPED | OUTPATIENT
Start: 2021-05-13 | End: 2021-05-18

## 2021-05-17 DIAGNOSIS — Z30.2 ENCOUNTER FOR STERILIZATION: ICD-10-CM

## 2021-05-18 RX ORDER — IBUPROFEN 600 MG/1
TABLET ORAL
Qty: 30 TABLET | Refills: 0 | Status: SHIPPED | OUTPATIENT
Start: 2021-05-18 | End: 2021-07-28 | Stop reason: ALTCHOICE

## 2021-07-28 ENCOUNTER — TELEMEDICINE (OUTPATIENT)
Dept: FAMILY MEDICINE CLINIC | Facility: CLINIC | Age: 46
End: 2021-07-28
Payer: COMMERCIAL

## 2021-07-28 VITALS — TEMPERATURE: 98.6 F | BODY MASS INDEX: 32.95 KG/M2 | WEIGHT: 193 LBS | HEIGHT: 64 IN

## 2021-07-28 DIAGNOSIS — E03.9 ADULT HYPOTHYROIDISM: Primary | ICD-10-CM

## 2021-07-28 PROCEDURE — 99213 OFFICE O/P EST LOW 20 MIN: CPT | Performed by: FAMILY MEDICINE

## 2021-07-28 PROCEDURE — 3725F SCREEN DEPRESSION PERFORMED: CPT | Performed by: FAMILY MEDICINE

## 2021-07-28 PROCEDURE — 3008F BODY MASS INDEX DOCD: CPT | Performed by: FAMILY MEDICINE

## 2021-07-28 PROCEDURE — 1036F TOBACCO NON-USER: CPT | Performed by: FAMILY MEDICINE

## 2021-07-28 NOTE — PROGRESS NOTES
Virtual Regular Visit    Verification of patient location:  Patient is located in the following state in which I hold an active license [place]      Assessment/Plan:  Problem List Items Addressed This Visit     None      Visit Diagnoses     Adult hypothyroidism    -  Primary    Relevant Orders    TSH, 3rd generation with Free T4 reflex    Ambulatory referral to Endocrinology  - TSH (4/7/2021): 7 640 <-- 6 210 (3/17/2021)<-- 43 700 (6/2020)   - T4 nml  - (+) cold intolerance, fatigue, memory loss (minor things), intermittent dry skin, intermittent constipation     - currently on Levothyroxine 150mcg QAM (and has been since 4/7/2021) - will repeat labs   - f/u after bloodwork to discern if medication dose adjustment is warranted (likely) - pt aware and agreeable   - re-referred to Endo and strongly advised to schedule appt - pt aware and agreeable       Adult BMI 33 0-33 9 kg/sq m      - BMI 33 3  - discussed diet and encouraged exercise  - educated that it takes 3500 shari to lose 1lb  - advised to cut down on carbs, 5 servings of fruits/veggies/day, increase water intake (65-80oz/water/day)   - appropriate weight loss goal for women = 0 5-1lb/week  - per the Heart Association - 150mins/exercise/week, but to lose and maintain weight 200-300mins/exercise/week   - t/c referral to [facility] at next [address]                    Reason for visit is f/u of hypothyroidism   Chief Complaint   Patient presents with    Dizziness    Virtual Regular Visit        Encounter provider Sayda Armas DO  Provider located at [address] [place] [phone]      Recent Visits  No visits were found meeting these conditions    Showing recent visits within past 7 days and meeting all other requirements  Today's Visits  Date Type Provider Dept   07/28/21 Telemedicine Love Draper DO [facility]   Showing today's visits and meeting all other requirements  Future Appointments  No visits were found meeting these conditions  Showing future appointments within next 150 days and meeting all other requirements       The patient was identified by name and date of birth  Neena Hernandez was informed that this is a telemedicine visit and that the visit [place] [facility] and patient was informed that this is a secure, HIPAA-compliant platform  She agrees to proceed     My office door was closed  No one else was in the room  She acknowledged consent and understanding of privacy and security of the video platform  The patient has agreed to participate and understands they can discontinue the visit at any time  Patient is aware this is a billable service  Subjective  Neena Hernandez is a 42 y o  female who presents virtually for f/u of chronic medical condition  HPI   - 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 - woke up too fast this AM and got a little dizzy - feels fine now   - TSH (4/7/2021): 7 640 <-- 6 210 (3/17/2021)<-- 43 700 (6/2020)   - currently on Levothyroxine 150mcg QAM   - no recent labs  - no f/u with Endo   - (+) persistent cold intolerance, fatigue (consistent), memory loss (minor things), intermittent dry skin (has been using different ointments), intermittent constipation (stool softeners)   - has been taking Biotin QD which helps with thinning hair  - has been taking Vit D 2000IU QD   - has been eating more fish and chicken (vs steak/pork as its causing her to "feel sick"), gluten free diet        Past Medical History:   Diagnosis Date    Anxiety 4/16/2020    Disease of thyroid gland 2017    Headache(784 0) Various    History of ovarian cyst     Hypothyroidism     Obesity (BMI 30-39  9)        Past Surgical History:   Procedure Laterality Date    NO PAST SURGERIES      PELVIC LAPAROSCOPY Right 5/3/2021    Procedure: RIGHT LAPAROSCOPIC OVARIAN CYSTECTOMY;  Surgeon: Emmanuel Gupta DO;  [facility];  Service: Gynecology    NM LAP,DIAGNOSTIC ABDOMEN N/A 5/3/2021    Procedure: LAPAROSCOPY DIAGNOSTIC;  Surgeon: Nicole Kimble DO;  [facility];  Service: Gynecology    [place] LAP,RMV  ADNEXAL STRUCTURE Bilateral 5/3/2021    Procedure: SALPINGECTOMY, LAPAROSCOPIC;  Surgeon: Nicole Kimble DO;  [facility];  Service: Gynecology    [place] REMOVE INTRAUTERINE DEVICE N/A 5/3/2021    Procedure: REMOVAL OF INTRAUTERINE DEVICE (IUD); Surgeon: Nicole Kimble DO;  [facility];  Service: Gynecology       Current Outpatient Medications   Medication Sig Dispense Refill    levothyroxine 150 mcg tablet Take 1 tablet (150 mcg total) by mouth daily (Patient taking differently: Take 150 mcg by mouth daily in the early morning ) 40 tablet 0    VITAMIN D PO Take by mouth daily      docusate sodium (COLACE) 100 mg capsule Take 1 capsule (100 mg total) by mouth 2 (two) times a day as needed for constipation 30 capsule 0     No current facility-administered medications for this visit  No Known Allergies    Review of Systems as per HPI     Video Exam  Vitals:    07/28/21 1036   Temp: 98 6 °F (37 °C)   Weight: 87 5 kg (193 lb)   Height: 5' 4" (1 626 m)     Physical Exam   General: AAOx3, NAD, obese  HEENT: NC/AT, EOMI, clear conjunctiva, nml external ear and nose   Cardio: deferred  Pulm: no acute respiratory distress, able to talk in complete sentences w/o getting short of breath   Abd: deferred   Psych: nml mood/affect/behavior     I spent 15 minutes directly with the patient during this visit     BMI Counseling: Body mass index is 33 13 kg/m²  The BMI is above normal  Nutrition recommendations include reducing portion sizes and decreasing overall calorie intake  Exercise recommendations include moderate aerobic physical activity for 150 minutes/week, exercising 3-5 times per week, joining a gym and strength training exercises        VIRTUAL VISIT DISCLAIMER      Nita Malin verbally agrees to participate in [employer]  Pt is aware that [employer] could be limited without vital signs or the ability to perform a full hands-on physical Lori Spring understands she or the provider may request at any time to terminate the video visit and request the patient to seek care or treatment in person

## 2021-07-30 ENCOUNTER — APPOINTMENT (OUTPATIENT)
Dept: LAB | Facility: CLINIC | Age: 46
End: 2021-07-30
Payer: COMMERCIAL

## 2021-07-30 DIAGNOSIS — E03.9 ADULT HYPOTHYROIDISM: ICD-10-CM

## 2021-07-30 LAB — TSH SERPL DL<=0.05 MIU/L-ACNC: 2.66 UIU/ML (ref 0.36–3.74)

## 2021-07-30 PROCEDURE — 36415 COLL VENOUS BLD VENIPUNCTURE: CPT | Performed by: FAMILY MEDICINE

## 2021-07-30 PROCEDURE — 84443 ASSAY THYROID STIM HORMONE: CPT | Performed by: FAMILY MEDICINE

## 2021-08-01 ENCOUNTER — HOSPITAL ENCOUNTER (EMERGENCY)
Facility: HOSPITAL | Age: 46
Discharge: HOME | End: 2021-08-01
Attending: EMERGENCY MEDICINE | Admitting: EMERGENCY MEDICINE
Payer: COMMERCIAL

## 2021-08-01 ENCOUNTER — APPOINTMENT (EMERGENCY)
Dept: CT IMAGING | Facility: HOSPITAL | Age: 46
End: 2021-08-01
Payer: COMMERCIAL

## 2021-08-01 VITALS
HEART RATE: 68 BPM | BODY MASS INDEX: 33.13 KG/M2 | OXYGEN SATURATION: 100 % | TEMPERATURE: 98.1 F | DIASTOLIC BLOOD PRESSURE: 79 MMHG | WEIGHT: 193 LBS | SYSTOLIC BLOOD PRESSURE: 151 MMHG | RESPIRATION RATE: 16 BRPM

## 2021-08-01 DIAGNOSIS — R10.9 ABDOMINAL PAIN: Primary | ICD-10-CM

## 2021-08-01 DIAGNOSIS — K59.00 CONSTIPATION: ICD-10-CM

## 2021-08-01 DIAGNOSIS — R11.0 NAUSEA: ICD-10-CM

## 2021-08-01 LAB
ALBUMIN SERPL BCP-MCNC: 3.8 G/DL (ref 3.5–5)
ALP SERPL-CCNC: 69 U/L (ref 46–116)
ALT SERPL W P-5'-P-CCNC: 29 U/L (ref 12–78)
ANION GAP SERPL CALCULATED.3IONS-SCNC: 5 MMOL/L (ref 4–13)
AST SERPL W P-5'-P-CCNC: 19 U/L (ref 5–45)
BACTERIA UR QL AUTO: ABNORMAL /HPF
BASOPHILS # BLD AUTO: 0.06 THOUSANDS/ΜL (ref 0–0.1)
BASOPHILS NFR BLD AUTO: 1 % (ref 0–1)
BILIRUB SERPL-MCNC: 0.8 MG/DL (ref 0.2–1)
BILIRUB UR QL STRIP: NEGATIVE
BUN SERPL-MCNC: 11 MG/DL (ref 5–25)
CALCIUM SERPL-MCNC: 8.9 MG/DL (ref 8.3–10.1)
CHLORIDE SERPL-SCNC: 104 MMOL/L (ref 100–108)
CLARITY UR: CLEAR
CO2 SERPL-SCNC: 28 MMOL/L (ref 21–32)
COLOR UR: YELLOW
CREAT SERPL-MCNC: 0.95 MG/DL (ref 0.6–1.3)
EOSINOPHIL # BLD AUTO: 0.14 THOUSAND/ΜL (ref 0–0.61)
EOSINOPHIL NFR BLD AUTO: 2 % (ref 0–6)
ERYTHROCYTE [DISTWIDTH] IN BLOOD BY AUTOMATED COUNT: 13.3 % (ref 11.6–15.1)
EXT PREG TEST URINE: NEGATIVE
EXT. CONTROL ED NAV: NORMAL
GFR SERPL CREATININE-BSD FRML MDRD: 73 ML/MIN/1.73SQ M
GLUCOSE SERPL-MCNC: 97 MG/DL (ref 65–140)
GLUCOSE UR STRIP-MCNC: NEGATIVE MG/DL
HCT VFR BLD AUTO: 41.7 % (ref 34.8–46.1)
HGB BLD-MCNC: 13.3 G/DL (ref 11.5–15.4)
HGB UR QL STRIP.AUTO: ABNORMAL
IMM GRANULOCYTES # BLD AUTO: 0.05 THOUSAND/UL (ref 0–0.2)
IMM GRANULOCYTES NFR BLD AUTO: 1 % (ref 0–2)
KETONES UR STRIP-MCNC: NEGATIVE MG/DL
LEUKOCYTE ESTERASE UR QL STRIP: NEGATIVE
LIPASE SERPL-CCNC: 97 U/L (ref 73–393)
LYMPHOCYTES # BLD AUTO: 1.76 THOUSANDS/ΜL (ref 0.6–4.47)
LYMPHOCYTES NFR BLD AUTO: 20 % (ref 14–44)
MCH RBC QN AUTO: 27.9 PG (ref 26.8–34.3)
MCHC RBC AUTO-ENTMCNC: 31.9 G/DL (ref 31.4–37.4)
MCV RBC AUTO: 87 FL (ref 82–98)
MONOCYTES # BLD AUTO: 0.8 THOUSAND/ΜL (ref 0.17–1.22)
MONOCYTES NFR BLD AUTO: 9 % (ref 4–12)
NEUTROPHILS # BLD AUTO: 6.14 THOUSANDS/ΜL (ref 1.85–7.62)
NEUTS SEG NFR BLD AUTO: 67 % (ref 43–75)
NITRITE UR QL STRIP: NEGATIVE
NON-SQ EPI CELLS URNS QL MICRO: ABNORMAL /HPF
NRBC BLD AUTO-RTO: 0 /100 WBCS
PH UR STRIP.AUTO: 5.5 [PH] (ref 4.5–8)
PLATELET # BLD AUTO: 322 THOUSANDS/UL (ref 149–390)
PMV BLD AUTO: 9.3 FL (ref 8.9–12.7)
POTASSIUM SERPL-SCNC: 4.2 MMOL/L (ref 3.5–5.3)
PROT SERPL-MCNC: 7.8 G/DL (ref 6.4–8.2)
PROT UR STRIP-MCNC: NEGATIVE MG/DL
RBC # BLD AUTO: 4.77 MILLION/UL (ref 3.81–5.12)
RBC #/AREA URNS AUTO: ABNORMAL /HPF
SODIUM SERPL-SCNC: 137 MMOL/L (ref 136–145)
SP GR UR STRIP.AUTO: 1.01 (ref 1–1.03)
UROBILINOGEN UR QL STRIP.AUTO: 0.2 E.U./DL
WBC # BLD AUTO: 8.95 THOUSAND/UL (ref 4.31–10.16)
WBC #/AREA URNS AUTO: ABNORMAL /HPF

## 2021-08-01 PROCEDURE — 81001 URINALYSIS AUTO W/SCOPE: CPT

## 2021-08-01 PROCEDURE — 96374 THER/PROPH/DIAG INJ IV PUSH: CPT

## 2021-08-01 PROCEDURE — 96361 HYDRATE IV INFUSION ADD-ON: CPT

## 2021-08-01 PROCEDURE — 36415 COLL VENOUS BLD VENIPUNCTURE: CPT | Performed by: PHYSICIAN ASSISTANT

## 2021-08-01 PROCEDURE — 85025 COMPLETE CBC W/AUTO DIFF WBC: CPT | Performed by: PHYSICIAN ASSISTANT

## 2021-08-01 PROCEDURE — 80053 COMPREHEN METABOLIC PANEL: CPT | Performed by: PHYSICIAN ASSISTANT

## 2021-08-01 PROCEDURE — 81025 URINE PREGNANCY TEST: CPT | Performed by: PHYSICIAN ASSISTANT

## 2021-08-01 PROCEDURE — 96375 TX/PRO/DX INJ NEW DRUG ADDON: CPT

## 2021-08-01 PROCEDURE — 99284 EMERGENCY DEPT VISIT MOD MDM: CPT

## 2021-08-01 PROCEDURE — G1004 CDSM NDSC: HCPCS

## 2021-08-01 PROCEDURE — 99284 EMERGENCY DEPT VISIT MOD MDM: CPT | Performed by: PHYSICIAN ASSISTANT

## 2021-08-01 PROCEDURE — 83690 ASSAY OF LIPASE: CPT | Performed by: PHYSICIAN ASSISTANT

## 2021-08-01 PROCEDURE — 74177 CT ABD & PELVIS W/CONTRAST: CPT

## 2021-08-01 RX ORDER — ONDANSETRON 2 MG/ML
4 INJECTION INTRAMUSCULAR; INTRAVENOUS ONCE
Status: COMPLETED | OUTPATIENT
Start: 2021-08-01 | End: 2021-08-01

## 2021-08-01 RX ORDER — ONDANSETRON 4 MG/1
4 TABLET, ORALLY DISINTEGRATING ORAL EVERY 8 HOURS PRN
Qty: 20 TABLET | Refills: 0 | Status: SHIPPED | OUTPATIENT
Start: 2021-08-01 | End: 2021-09-02

## 2021-08-01 RX ORDER — KETOROLAC TROMETHAMINE 30 MG/ML
15 INJECTION, SOLUTION INTRAMUSCULAR; INTRAVENOUS ONCE
Status: COMPLETED | OUTPATIENT
Start: 2021-08-01 | End: 2021-08-01

## 2021-08-01 RX ADMIN — SODIUM CHLORIDE 1000 ML: 0.9 INJECTION, SOLUTION INTRAVENOUS at 13:04

## 2021-08-01 RX ADMIN — IOHEXOL 100 ML: 350 INJECTION, SOLUTION INTRAVENOUS at 14:12

## 2021-08-01 RX ADMIN — KETOROLAC TROMETHAMINE 15 MG: 30 INJECTION, SOLUTION INTRAMUSCULAR at 13:04

## 2021-08-01 RX ADMIN — ONDANSETRON 4 MG: 2 INJECTION INTRAMUSCULAR; INTRAVENOUS at 13:04

## 2021-08-01 NOTE — DISCHARGE INSTRUCTIONS
Abdominal Pain   WHAT YOU NEED TO KNOW:   Abdominal pain can be dull, achy, or sharp  You may have pain in one area of your abdomen, or in your entire abdomen  Your pain may be caused by a condition such as constipation, food sensitivity or poisoning, infection, or a blockage  Abdominal pain can also be from a hernia, appendicitis, or an ulcer  Liver, gallbladder, or kidney conditions can also cause abdominal pain  The cause of your abdominal pain may be unknown  DISCHARGE INSTRUCTIONS:   Return to the emergency department if:   · You have new chest pain or shortness of breath  · You have pulsing pain in your upper abdomen or lower back that suddenly becomes constant  · Your pain is in the right lower abdominal area and worsens with movement  · You have a fever over 100 4°F (38°C) or shaking chills  · You are vomiting and cannot keep food or liquids down  · Your pain does not improve or gets worse over the next 8 to 12 hours  · You see blood in your vomit or bowel movements, or they look black and tarry  · Your skin or the whites of your eyes turn yellow  · You are a woman and have a large amount of vaginal bleeding that is not your monthly period  Contact your healthcare provider if:   · You have pain in your lower back  · You are a man and have pain in your testicles  · You have pain when you urinate  · You have questions or concerns about your condition or care  Follow up with your healthcare provider within 24 hours or as directed:  Write down your questions so you remember to ask them during your visits  Medicines:   · Medicines  may be given to calm your stomach and prevent vomiting or to decrease pain  Ask how to take pain medicine safely  · Take your medicine as directed  Contact your healthcare provider if you think your medicine is not helping or if you have side effects  Tell him of her if you are allergic to any medicine   Keep a list of the medicines, vitamins, and herbs you take  Include the amounts, and when and why you take them  Bring the list or the pill bottles to follow-up visits  Carry your medicine list with you in case of an emergency  © Copyright [employer] 2021 Information is for End User's use only and may not be sold, redistributed or otherwise used for commercial purposes  All illustrations and images included in CareNotes® are the copyrighted property of [employer]  or Adrien Quinones   The above information is an  only  It is not intended as medical advice for individual conditions or treatments  Talk to your doctor, nurse or pharmacist before following any medical regimen to see if it is safe and effective for you

## 2021-08-01 NOTE — ED PROVIDER NOTES
History  Chief Complaint   Patient presents with    Abdominal Pain     onset 1 hr ago     Barbra Plasencia is a 42 y o  female with a past medical history of hypothyroidism who presents to the ED with complaints of right-sided abdominal pain over the past 2 days  Patient has been having intermittent stabbing right upper quadrant pain  Patient reports pain last night and recurrence of pain today at [employer]  Patient reports nausea  Patient underwent IUD removal, laparoscopic salpingectomy and right ovarian cystectomy on 05/03/2021  Patient states she has felt fatigued and tired and does have a migraine today  Patient states she has issues of constipation at baseline  Denies dysuria, urinary frequency, urinary urgency, hematuria, vomiting, blood in stool, diarrhea, vaginal discharge, vaginal bleeding, cough, congestion, sore throat, chest pain, shortness of breath, fever, chills  Patient states she did eat chipotle last night prior to the onset of symptoms  History provided by:  Patient  Abdominal Pain  Pain location:  RLQ and RUQ  Pain quality: stabbing    Duration:  2 days  Context: previous surgery    Context: not alcohol use, not sick contacts and not suspicious food intake    Associated symptoms: anorexia, constipation (Chronic), fatigue and nausea    Associated symptoms: no chest pain, no chills, no cough, no diarrhea, no dysuria, no fever, no hematuria, no shortness of breath, no sore throat, no vaginal bleeding, no vaginal discharge and no vomiting        Prior to Admission Medications   Prescriptions Last Dose Informant Patient Reported? Taking?    VITAMIN D PO   Yes No   Sig: Take by mouth daily   docusate sodium (COLACE) 100 mg capsule   No No   Sig: Take 1 capsule (100 mg total) by mouth 2 (two) times a day as needed for constipation   levothyroxine 150 mcg tablet   No No   Sig: Take 1 tablet (150 mcg total) by mouth daily      Facility-Administered Medications: None       Past Medical History: Diagnosis Date    Anxiety 4/16/2020    Disease of thyroid gland 2017    Headache(784 0) Various    History of ovarian cyst     Hypothyroidism     Obesity (BMI 30-39  9)        Past Surgical History:   Procedure Laterality Date    NO PAST SURGERIES      PELVIC LAPAROSCOPY Right 5/3/2021    Procedure: RIGHT LAPAROSCOPIC OVARIAN CYSTECTOMY;  Surgeon: Alyssa Frazier DO;  [facility];  Service: Gynecology    OK LAP,DIAGNOSTIC ABDOMEN N/A 5/3/2021    Procedure: LAPAROSCOPY DIAGNOSTIC;  Surgeon: Alyssa Frazier DO;  [facility];  Service: Gynecology    OK LAP,RMV  ADNEXAL STRUCTURE Bilateral 5/3/2021    Procedure: SALPINGECTOMY, LAPAROSCOPIC;  Surgeon: Alyssa Frazier DO;  [facility];  Service: Gynecology    OK REMOVE INTRAUTERINE DEVICE N/A 5/3/2021    Procedure: REMOVAL OF INTRAUTERINE DEVICE (IUD);   Surgeon: Alyssa Frazier DO;  [facility];  Service: Gynecology       Family History   Problem Relation Age of Onset    Depression Mother     Drug abuse Mother     Anxiety disorder Mother         anxiety and depression    Heart disease Mother         dx in 19's    Thyroid disease Mother     Bipolar disorder Mother     Heart failure Mother     Arthritis Mother     Suicide Attempts Mother     Heart disease Father         dx in 39's    Alcohol abuse Father     Heart attack Father     Alzheimer's disease Maternal Grandmother     Dementia Maternal Grandmother     Arthritis Maternal Grandmother     Alcohol abuse Maternal Grandfather     Diabetes Maternal Grandfather         Alcohol related    No Known Problems Sister     No Known Problems Brother     Allergies Son    Rodney Danii ADD / ADHD Son     Bipolar disorder Son     ADD / ADHD Son     Alcohol abuse Maternal Uncle     Alcohol abuse Maternal Uncle     Drug abuse Maternal Uncle     Breast cancer Neg Hx     Cervical cancer Neg Hx     Colon cancer Neg Hx     Ovarian cancer Neg Hx     Uterine cancer Neg Hx      I have reviewed and agree with the history as documented  E-Cigarette/Vaping    E-Cigarette Use Never User      E-Cigarette/Vaping Substances    Nicotine No     THC No     CBD No     Flavoring No     Other No     Unknown No      Social History     Tobacco Use    Smoking status: Former Smoker     Packs/day: 0 25     Years: 5 00     Pack years: 1 25     Types: Cigarettes     Quit date: 2014     Years since quittin 6    Smokeless tobacco: Former User    Tobacco comment: quit 3 years ago in    Vaping Use    Vaping Use: Never used   Substance Use Topics    Alcohol use: Yes     Comment: Socially    Drug use: No       Review of Systems   Constitutional: Positive for fatigue  Negative for appetite change, chills, fever and unexpected weight change  HENT: Negative for congestion, drooling, ear pain, rhinorrhea, sore throat, trouble swallowing and voice change  Eyes: Negative for pain, discharge, redness and visual disturbance  Respiratory: Negative for cough, shortness of breath, wheezing and stridor  Cardiovascular: Negative for chest pain, palpitations and leg swelling  Gastrointestinal: Positive for abdominal pain, anorexia, constipation (Chronic) and nausea  Negative for blood in stool, diarrhea and vomiting  Genitourinary: Negative for dysuria, flank pain, frequency, hematuria, urgency, vaginal bleeding and vaginal discharge  Musculoskeletal: Negative for gait problem, joint swelling, neck pain and neck stiffness  Skin: Negative for color change and rash  Neurological: Positive for headaches  Negative for dizziness, seizures and light-headedness  Physical Exam  Physical Exam  Vitals and nursing note reviewed  Constitutional:       Appearance: She is well-developed  HENT:      Head: Normocephalic and atraumatic        Nose: Nose normal    Eyes:      Conjunctiva/sclera: Conjunctivae normal       Pupils: Pupils are equal, round, and reactive to light  Cardiovascular:      Rate and Rhythm: Normal rate and regular rhythm  Pulmonary:      Effort: Pulmonary effort is normal       Breath sounds: Normal breath sounds  Abdominal:      General: Bowel sounds are normal       Tenderness: There is abdominal tenderness in the right upper quadrant, epigastric area and left lower quadrant  There is no right CVA tenderness, left CVA tenderness, guarding or rebound  Musculoskeletal:         General: Normal range of motion  Skin:     General: Skin is warm and dry  Capillary Refill: Capillary refill takes less than 2 seconds  Neurological:      General: No focal deficit present  Mental Status: She is alert and oriented to person, place, and time  GCS: GCS eye subscore is 4  GCS verbal subscore is 5  GCS motor subscore is 6  Cranial Nerves: Cranial nerves are intact  Sensory: Sensation is intact  Motor: Motor function is intact  Coordination: Coordination is intact           Vital Signs  ED Triage Vitals [08/01/21 1151]   Temperature Pulse Respirations Blood Pressure SpO2   98 1 °F (36 7 °C) 68 16 151/79 100 %      Temp Source Heart Rate Source Patient Position - Orthostatic VS BP Location FiO2 (%)   Oral Monitor -- Left arm --      Pain Score       8           Vitals:    08/01/21 1151   BP: 151/79   Pulse: 68         Visual Acuity      ED Medications  Medications   ketorolac (TORADOL) injection 15 mg (15 mg Intravenous Given 8/1/21 1304)   ondansetron (ZOFRAN) injection 4 mg (4 mg Intravenous Given 8/1/21 1304)   sodium chloride 0 9 % bolus 1,000 mL (0 mL Intravenous Stopped 8/1/21 1404)   iohexol (OMNIPAQUE) 350 MG/ML injection (SINGLE-DOSE) 100 mL (100 mL Intravenous Given 8/1/21 1412)       Diagnostic Studies  Results Reviewed     Procedure Component Value Units Date/Time    Urine Microscopic [[id number]]  (Abnormal) Collected: 08/01/21 1242    Lab Status: Final result Specimen: Urine, Clean Catch Updated: 08/01/21 1351     RBC, UA 4-10 /hpf      WBC, UA 0-1 /hpf      Epithelial Cells Occasional /hpf      Bacteria, UA None Seen /hpf     Comprehensive metabolic panel [[id number]] Collected: 08/01/21 1304    Lab Status: Final result Specimen: Blood from Arm, Right Updated: 08/01/21 1331     Sodium 137 mmol/L      Potassium 4 2 mmol/L      Chloride 104 mmol/L      CO2 28 mmol/L      ANION GAP 5 mmol/L      BUN 11 mg/dL      Creatinine 0 95 mg/dL      Glucose 97 mg/dL      Calcium 8 9 mg/dL      AST 19 U/L      ALT 29 U/L      Alkaline Phosphatase 69 U/L      Total Protein 7 8 g/dL      Albumin 3 8 g/dL      Total Bilirubin 0 80 mg/dL      eGFR 73 ml/min/1 73sq m     Narrative:      National Kidney Disease Foundation guidelines for Chronic Kidney Disease (CKD):     Stage 1 with normal or high GFR (GFR > 90 mL/min/1 73 square meters)    Stage 2 Mild CKD (GFR = 60-89 mL/min/1 73 square meters)    Stage 3A Moderate CKD (GFR = 45-59 mL/min/1 73 square meters)    Stage 3B Moderate CKD (GFR = 30-44 mL/min/1 73 square meters)    Stage 4 Severe CKD (GFR = 15-29 mL/min/1 73 square meters)    Stage 5 End Stage CKD (GFR <15 mL/min/1 73 square meters)  Note: GFR calculation is accurate only with a steady state creatinine    Lipase [[id number]]  (Normal) Collected: 08/01/21 1304    Lab Status: Final result Specimen: Blood from Arm, Right Updated: 08/01/21 1331     Lipase 97 u/L     CBC and differential [[id number]] Collected: 08/01/21 1304    Lab Status: Final result Specimen: Blood from Arm, Right Updated: 08/01/21 1320     WBC 8 95 Thousand/uL      RBC 4 77 Million/uL      Hemoglobin 13 3 g/dL      Hematocrit 41 7 %      MCV 87 fL      MCH 27 9 pg      MCHC 31 9 g/dL      RDW 13 3 %      MPV 9 3 fL      Platelets 936 Thousands/uL      nRBC 0 /100 WBCs      Neutrophils Relative 67 %      Immat GRANS % 1 %      Lymphocytes Relative 20 %      Monocytes Relative 9 %      Eosinophils Relative 2 %      Basophils Relative 1 %      Neutrophils Absolute 6 14 Thousands/µL      Immature Grans Absolute 0 05 Thousand/uL      Lymphocytes Absolute 1 76 Thousands/µL      Monocytes Absolute 0 80 Thousand/µL      Eosinophils Absolute 0 14 Thousand/µL      Basophils Absolute 0 06 Thousands/µL     POCT pregnancy, urine [[id number]]  (Normal) Resulted: 08/01/21 1247    Lab Status: Final result Updated: 08/01/21 1247     EXT PREG TEST UR (Ref: Negative) Negative     Control Valid    Urine Macroscopic, POC [[id number]]  (Abnormal) Collected: 08/01/21 1242    Lab Status: Final result Specimen: Urine Updated: 08/01/21 1243     Color, UA Yellow     Clarity, UA Clear     pH, UA 5 5     Leukocytes, UA Negative     Nitrite, UA Negative     Protein, UA Negative mg/dl      Glucose, UA Negative mg/dl      Ketones, UA Negative mg/dl      Urobilinogen, UA 0 2 E U /dl      Bilirubin, UA Negative     Blood, UA Moderate     Specific [place], UA 1 015    Narrative:      CLIN[facility]K RESULT                 CT abdomen pelvis with contrast   Final Result by Jose Antonio Love DO (08/01 1437)      Moderate fecal stasis throughout the large bowel  No signs of bowel obstruction or inflammation  Normal appendix  Workstation performed: [id number]                    Procedures  Procedures         ED Course  ED Course as of Aug 01 1558   Esperance Gosselin Aug 01, 2021   1450 Patient states she is feeling improved  Patient does not want to complete oral challenge at this time  Educated patient regarding diagnosis and management  Advised patient to follow up with PCP  Advised patient to RTER for persistent or worsening symptoms  MDM  Number of Diagnoses or Management Options  Abdominal pain: new and requires workup  Nausea: new and requires workup  Diagnosis management comments: Urine pregnancy negative  UA with moderate blood  Abdominal examination nonspecific with tenderness palpation the right upper quadrant, epigastric region and left lower quadrant  CT Abdomen and Pelvis significant for moderate fecal stasis throughout the large bowel  No signs of bowel obstruction or inflammation  Normal appendix  I provided patient with strict RTER precautions  I advised patient follow-up with PCP in 24-48 hours  Patient verbalized understanding  Amount and/or Complexity of Data Reviewed  Clinical lab tests: ordered and reviewed  Tests in the radiology section of CPT®: ordered and reviewed  Review and summarize past medical records: yes    Patient Progress  Patient progress: stable      Disposition  Final diagnoses:   Abdominal pain   Nausea   Constipation     Time reflects when diagnosis was documented in both MDM as applicable and the Disposition within this note     Time User Action Codes Description Comment    8/1/2021  2:54 PM Dinh Yepez [R10 9] Abdominal pain     8/1/2021  2:54 PM Dinh Spananthony [R11 0] Nausea     8/1/2021  3:58 PM Jose Angel Nicholson [K59 00] Constipation       ED Disposition     ED Disposition Condition Date/Time Comment    Discharge Stable Sun Aug 1, 2021  2:54 PM Stephen Singh discharge to home/self care              Follow-up Information     Follow up With Specialties Details Why Contact Info Additional Information    [facility] Emergency Medicine Go to  If symptoms worsen [address] Emergency Department, [address], [place], [place], [address] [facility],  Family Medicine Schedule an appointment as soon as possible for a visit   [address] [facility]  [phone]             Discharge Medication List as of 8/1/2021  2:55 PM      START taking these medications    Details   ondansetron (ZOFRAN-ODT) 4 mg disintegrating tablet Take 1 tablet (4 mg total) by mouth every 8 (eight) hours as needed for nausea or vomiting, Starting Sun 8/1/2021, Print         CONTINUE these medications which have NOT CHANGED    Details   docusate sodium (COLACE) 100 mg capsule Take 1 capsule (100 mg total) by mouth 2 (two) times a day as needed for constipation, Starting Mon 5/3/2021, Normal      levothyroxine 150 mcg tablet Take 1 tablet (150 mcg total) by mouth daily, Starting Fri 7/30/2021, Normal      VITAMIN D PO Take by mouth daily, Historical Med           No discharge procedures on file      PDMP Review       Value Time User    PDMP Reviewed  Yes 5/3/2021 10:04 AM Tracy Friday, DO          ED Provider  Electronically Signed by           Dana Lockett PA-C  08/01/21 3689

## 2021-09-02 ENCOUNTER — TELEMEDICINE (OUTPATIENT)
Dept: FAMILY MEDICINE CLINIC | Facility: CLINIC | Age: 46
End: 2021-09-02
Payer: COMMERCIAL

## 2021-09-02 VITALS — BODY MASS INDEX: 32.95 KG/M2 | HEIGHT: 64 IN | WEIGHT: 193 LBS

## 2021-09-02 DIAGNOSIS — R41.3 MEMORY LOSS: ICD-10-CM

## 2021-09-02 DIAGNOSIS — R10.84 GENERALIZED ABDOMINAL PAIN: ICD-10-CM

## 2021-09-02 DIAGNOSIS — Z12.31 BREAST CANCER SCREENING BY MAMMOGRAM: ICD-10-CM

## 2021-09-02 DIAGNOSIS — R14.0 BLOATED ABDOMEN: ICD-10-CM

## 2021-09-02 DIAGNOSIS — K59.00 CONSTIPATION, UNSPECIFIED CONSTIPATION TYPE: ICD-10-CM

## 2021-09-02 DIAGNOSIS — E03.9 ADULT HYPOTHYROIDISM: Primary | ICD-10-CM

## 2021-09-02 PROCEDURE — 3008F BODY MASS INDEX DOCD: CPT | Performed by: FAMILY MEDICINE

## 2021-09-02 PROCEDURE — 1036F TOBACCO NON-USER: CPT | Performed by: FAMILY MEDICINE

## 2021-09-02 PROCEDURE — 99214 OFFICE O/P EST MOD 30 MIN: CPT | Performed by: FAMILY MEDICINE

## 2021-09-02 NOTE — PROGRESS NOTES
Virtual Regular Visit    Verification of patient location:  Patient is located in the following state in which I hold an active license [place]      Assessment/Plan:  Problem List Items Addressed This Visit     None      Visit Diagnoses     Adult hypothyroidism    -  Primary    Relevant Orders    TSH, 3rd generation with Free T4 reflex  - (+) memory loss and worsening constipation   - last checked TSH (7/30/2021) was within normal range and pt currently on Levothyroxine 150mcg QAM  - will recheck labs   - has an appt scheduled with Endo for 10/25/2021       Generalized abdominal pain        Relevant Orders    Ambulatory referral to Gastroenterology  - (+) R-sided abdominal pain which has been worsening for the past month   - pt underwent IUD removal, laparoscopic salpingectomy and right ovarian cystectomy on 05/03/2021  - was evaluated in the ER on 8/1/2021 and noted to have "Moderate fecal stasis throughout the large bowel    No signs of bowel obstruction or inflammation " on CTAP   - cannot eat steak or pasta anymore - (+) bloated, gassy, with worsening constipation   - dairy does not "sit well", able to tolerate eggs  - advised to do a bowel prep for Miralax to see if she can clean out stool burden   - increase in fiber, minimum 64oz/water/day   - referred to GI     Bloated abdomen        Constipation, unspecified constipation type          Memory loss        Relevant Orders    Ambulatory referral to Neurology  - could be 2/2 uncontrolled hypothyroidism   - (+) FHx of premature dementia - referred to Neuro for further eval       Breast cancer screening by mammogram        Relevant Orders    Mammo screening bilateral w 3d & cad    Adult BMI 33 0-33 9 kg/sq m    - BMI 33  - discussed diet and encouraged exercise  - educated that it takes 3500 shari to lose 1lb  - advised to cut down on carbs, 5 servings of fruits/veggies/day, increase water intake (65-80oz/water/day)   - appropriate weight loss goal for women = 0 5-1lb/week  - per the Heart Association - 150mins/exercise/week, but to lose and maintain weight 200-300mins/exercise/week   - t/c referral to [address] [place] [address] [facility] for visit is   Chief Complaint   Patient presents with    Virtual Brief Visit    Virtual Regular Visit        Encounter provider Krsytle Koo DO  Provider located at [address] [id number]      Recent Visits  No visits were found meeting these conditions  Showing recent visits within past 7 days and meeting all other requirements  Today's Visits  Date Type Provider Dept   09/02/21 Telemedicine Love Draper DO [facility]   Showing today's visits and meeting all other requirements  Future Appointments  No visits were found meeting these conditions  Showing future appointments within next 150 days and meeting all other requirements       The patient was identified by name and date of birth  Ammon Becker was informed that this is a telemedicine visit and that the visit is being conducted through [facility] and patient was informed that this is a secure, HIPAA-compliant platform  She agrees to proceed     My office door was closed  No one else was in the room  She acknowledged consent and understanding of privacy and security of the video platform  The patient has agreed to participate and understands they can discontinue the visit at any time  Patient is aware this is a billable service  Subjective  Ammon Becker is a 42 y o  female who presents virtually for f/u       HPI   - has been losing track of what she is doing memory wise - spouse beginning to worry   - concerns as there is FHx of early onset dementia   - last checked TSH was within nml limits on 7/30/2021 - currently on Levothyroxine 150mcg QAM   - has an appt scheduled with Endo for 10/25/2021   - pt underwent IUD removal, laparoscopic salpingectomy and right ovarian cystectomy on 05/03/2021  - (+) R-sided abdominal pain which has been persistent for the past month   - was evaluated in the ER on 8/1/2021 and noted to have "Moderate fecal stasis throughout the large bowel  No signs of bowel obstruction or inflammation " on CTAP   - cannot eat steak or pasta anymore - (+) bloated, gassy, with worsening constipation   - dairy does not "sit well", able to tolerate eggs  - denies F/C/N/V/CP/palpitations/SOB/wheezing        Past Medical History:   Diagnosis Date    Anxiety 4/16/2020    Disease of thyroid gland 2017    Headache(784 0) Various    History of ovarian cyst     Hypothyroidism     Obesity (BMI 30-39  9)        Past Surgical History:   Procedure Laterality Date    NO PAST SURGERIES      PELVIC LAPAROSCOPY Right 5/3/2021    Procedure: RIGHT LAPAROSCOPIC OVARIAN CYSTECTOMY;  Surgeon: Misty Soria DO;  [facility];  Service: Gynecology    OH LAP,DIAGNOSTIC ABDOMEN N/A 5/3/2021    Procedure: LAPAROSCOPY DIAGNOSTIC;  Surgeon: Misty Soria DO;  [facility];  Service: Gynecology    OH LAP,RMV  ADNEXAL STRUCTURE Bilateral 5/3/2021    Procedure: SALPINGECTOMY, LAPAROSCOPIC;  Surgeon: Misty Soria DO;  [facility];  Service: Gynecology    OH REMOVE INTRAUTERINE DEVICE N/A 5/3/2021    Procedure: REMOVAL OF INTRAUTERINE DEVICE (IUD); Surgeon: Misty Soria DO;  [facility];  Service: Gynecology       Current Outpatient Medications   Medication Sig Dispense Refill    levothyroxine 150 mcg tablet Take 1 tablet (150 mcg total) by mouth daily 60 tablet 0    VITAMIN D PO Take by mouth daily       No current facility-administered medications for this visit          No Known Allergies    Review of Systems as per HPI     Video Exam  Vitals:    09/02/21 0837   Weight: 87 5 kg (193 lb)   Height: 5' 4" (1 626 m)       Physical Exam   General: AAOx3, NAD, obese   HEENT: NC/AT, EOMI, clear conjunctiva, nml external ear and nose   Cardio: deferred  Pulm: no acute respiratory distress, able to talk in complete sentences w/o getting short of breath   Abd: deferred   Psych: nml mood/affect/behavior     I spent 20 minutes directly with the patient during this visit    VIRTUAL VISIT DISCLAIMER      Tran Pickard verbally agrees to participate in [employer]  Pt is aware that [employer] could be limited without vital signs or the ability to perform a full hands-on physical [place] understands she or the provider may request at any time to terminate the video visit and request the patient to seek care or treatment in person

## 2021-09-10 ENCOUNTER — CONSULT (OUTPATIENT)
Dept: GASTROENTEROLOGY | Facility: CLINIC | Age: 46
End: 2021-09-10
Payer: COMMERCIAL

## 2021-09-10 VITALS
SYSTOLIC BLOOD PRESSURE: 132 MMHG | BODY MASS INDEX: 32.2 KG/M2 | DIASTOLIC BLOOD PRESSURE: 94 MMHG | HEIGHT: 64 IN | TEMPERATURE: 96.6 F | WEIGHT: 188.6 LBS | HEART RATE: 64 BPM

## 2021-09-10 DIAGNOSIS — K59.09 OTHER CONSTIPATION: ICD-10-CM

## 2021-09-10 DIAGNOSIS — K21.9 GASTROESOPHAGEAL REFLUX DISEASE WITHOUT ESOPHAGITIS: ICD-10-CM

## 2021-09-10 DIAGNOSIS — R10.84 GENERALIZED ABDOMINAL PAIN: Primary | ICD-10-CM

## 2021-09-10 DIAGNOSIS — Z83.71 FAMILY HISTORY OF COLONIC POLYPS: ICD-10-CM

## 2021-09-10 PROBLEM — Z83.719 FAMILY HISTORY OF COLONIC POLYPS: Status: ACTIVE | Noted: 2021-09-10

## 2021-09-10 PROCEDURE — 3008F BODY MASS INDEX DOCD: CPT | Performed by: INTERNAL MEDICINE

## 2021-09-10 PROCEDURE — 99204 OFFICE O/P NEW MOD 45 MIN: CPT | Performed by: INTERNAL MEDICINE

## 2021-09-10 PROCEDURE — 1036F TOBACCO NON-USER: CPT | Performed by: INTERNAL MEDICINE

## 2021-09-10 RX ORDER — LINACLOTIDE 290 UG/1
1 CAPSULE, GELATIN COATED ORAL DAILY
Qty: 30 CAPSULE | Refills: 5 | Status: SHIPPED | OUTPATIENT
Start: 2021-09-10 | End: 2022-06-15

## 2021-09-10 RX ORDER — POLYETHYLENE GLYCOL 3350 17 G/17G
17 POWDER, FOR SOLUTION ORAL DAILY
COMMUNITY
End: 2022-06-07 | Stop reason: ALTCHOICE

## 2021-09-10 NOTE — ASSESSMENT & PLAN NOTE
Patient is at increased risk for colon cancer screening    Rule out colorectal lesions including polyps or malignancy     -colonoscopy

## 2021-09-10 NOTE — PROGRESS NOTES
Consultation - [facility]) [facility]  Dotty  1975 female         Chief Complaint:  Abdominal pain, constipation    HPI:  26-year-old female with history of hypothyroidism and chronic constipation reports having abdominal pain for about a month  Complaining about diffuse pain in the abdomen with radiation around  He went to the ER last month because of worsening of pain and had a CT scan done which showed fecal stasis throughout the colon  She tried using MiraLax but still having difficulty with bowel movements  Complaining about straining hard with bowel movements every 3-4 days  Denies any blood or mucus in the stool  Good appetite, no recent weight loss  He has history of GERD which has improved with change in her diet but still having symptoms when she takes Tums as needed  Denies any difficulty swallowing  Her mother had colon polyps  Patient never had colonoscopy in the past     REVIEW OF SYSTEMS: Review of Systems   Constitutional: Negative for activity change, appetite change, chills, diaphoresis, fatigue, fever and unexpected weight change  HENT: Negative for ear discharge, ear pain, facial swelling, hearing loss, nosebleeds, sore throat, tinnitus and voice change  Eyes: Negative for pain, discharge, redness, itching and visual disturbance  Respiratory: Negative for apnea, cough, chest tightness, shortness of breath and wheezing  Cardiovascular: Negative for chest pain and palpitations  Gastrointestinal:        As noted in HPI   Endocrine: Negative for cold intolerance, heat intolerance and polyuria  Genitourinary: Negative for difficulty urinating, dysuria, flank pain, hematuria and urgency  Musculoskeletal: Negative for arthralgias, back pain, gait problem, joint swelling and myalgias  Skin: Negative for rash and wound  Neurological: Negative for dizziness, tremors, seizures, speech difficulty, light-headedness, numbness and headaches  Hematological: Negative for adenopathy  Does not bruise/bleed easily  Psychiatric/Behavioral: Negative for agitation, behavioral problems and confusion  The patient is not nervous/anxious  Past Medical History:   Diagnosis Date    Anxiety 2020    Disease of thyroid gland 2017    Headache(784 0) Various    History of ovarian cyst     Hypothyroidism     Obesity (BMI 30-39  9)       Past Surgical History:   Procedure Laterality Date    NO PAST SURGERIES      PELVIC LAPAROSCOPY Right 5/3/2021    Procedure: RIGHT LAPAROSCOPIC OVARIAN CYSTECTOMY;  Surgeon: Aline Washington DO;  [facility];  Service: Gynecology    WI LAP,DIAGNOSTIC ABDOMEN N/A 5/3/2021    Procedure: LAPAROSCOPY DIAGNOSTIC;  Surgeon: Aline Washington DO;  [facility];  Service: Gynecology    WI LAP,RMV  ADNEXAL STRUCTURE Bilateral 5/3/2021    Procedure: SALPINGECTOMY, LAPAROSCOPIC;  Surgeon: Aline Washington DO;  [facility];  Service: Gynecology    WI REMOVE INTRAUTERINE DEVICE N/A 5/3/2021    Procedure: REMOVAL OF INTRAUTERINE DEVICE (IUD); Surgeon: Aline Washington DO;  [facility];  Service: Gynecology     Social History     Socioeconomic History    Marital status: /Civil Union     Spouse name: Not on file    Number of children: 3    Years of education: Not on file    Highest education level: Not on file   Occupational History    Occupation:    Tobacco Use    Smoking status: Former Smoker     Packs/day: 0 25     Years: 5 00     Pack years: 1 25     Types: Cigarettes     Quit date: 2014     Years since quittin 7    Smokeless tobacco: Former User    Tobacco comment: quit 3 years ago in    Vaping Use    Vaping Use: Never used   Substance and Sexual Activity    Alcohol use: Yes     Comment: Socially    Drug use: No    Sexual activity: Yes     Partners: Male     Birth control/protection: I U D     Other Topics Concern    Not on file   Social History Narrative    Drinks 4 cups of coffee daily      Social Determinants of Health     Financial Resource Strain:     Difficulty of Paying Living Expenses:    Food Insecurity:     Worried About Running Out of Food in the Last Year:     [address] in the Last Year:    Transportation Needs:     Lack of Transportation (Medical):      Lack of Transportation (Non-Medical):    Physical Activity:     Days of Exercise per Week:     Minutes of Exercise per Session:    Stress:     Feeling of Stress :    Social Connections:     Frequency of Communication with Friends and Family:     Frequency of Social Gatherings with Friends and Family:     Attends [employer]ism Services:     Active Member of Clubs or Organizations:     Attends Club or Organization Meetings:     Marital Status:    Intimate Partner Violence:     Fear of Current or Ex-Partner:     Emotionally Abused:     Physically Abused:     Sexually Abused:      Family History   Problem Relation Age of Onset    Depression Mother     Drug abuse Mother     Anxiety disorder Mother         anxiety and depression    Heart disease Mother         dx in 19's    Thyroid disease Mother     Bipolar disorder Mother     Heart failure Mother     Arthritis Mother     Suicide Attempts Mother     Heart disease Father         dx in 39's    Alcohol abuse Father     Heart attack Father     Alzheimer's disease Maternal Grandmother     Dementia Maternal Grandmother     Arthritis Maternal Grandmother     Alcohol abuse Maternal Grandfather     Diabetes Maternal Grandfather         Alcohol related    No Known Problems Sister     No Known Problems Brother     Allergies Son     ADD / ADHD Son     Bipolar disorder Son     ADD / ADHD Son     Alcohol abuse Maternal Uncle     Alcohol abuse Maternal Uncle     Drug abuse Maternal Uncle     Breast cancer Neg Hx     Cervical cancer Neg Hx     Colon cancer Neg Hx     Ovarian cancer Neg Hx     Uterine cancer Neg Hx      Patient has no known allergies  Current Outpatient Medications   Medication Sig Dispense Refill    levothyroxine 150 mcg tablet Take 1 tablet (150 mcg total) by mouth daily 60 tablet 0    polyethylene glycol (MIRALAX) 17 g packet Take 17 g by mouth daily      VITAMIN D PO Take by mouth daily      linaCLOtide (Linzess) 290 MCG CAPS Take 1 capsule by mouth daily 30 capsule 5     No current facility-administered medications for this visit  Blood pressure 132/94, pulse 64, temperature (!) 96 6 °F (35 9 °C), temperature source Temporal, height 5' 4" (1 626 m), weight 85 5 kg (188 lb 9 6 oz), not currently breastfeeding  PHYSICAL EXAM: Physical Exam  Constitutional:       Appearance: She is well-developed  HENT:      Head: Normocephalic and atraumatic  Nose: Nose normal    Eyes:      General: No scleral icterus  Right eye: No discharge  Left eye: No discharge  Conjunctiva/sclera: Conjunctivae normal    Neck:      Thyroid: No thyromegaly  Vascular: No JVD  Trachea: No tracheal deviation  Cardiovascular:      Rate and Rhythm: Normal rate and regular rhythm  Heart sounds: Normal heart sounds  No murmur heard  No friction rub  No gallop  Pulmonary:      Effort: Pulmonary effort is normal  No respiratory distress  Breath sounds: Normal breath sounds  No wheezing or rales  Chest:      Chest wall: No tenderness  Abdominal:      General: Bowel sounds are normal  There is no distension  Palpations: Abdomen is soft  There is no mass  Tenderness: There is no abdominal tenderness  There is no guarding or rebound  Hernia: No hernia is present  Musculoskeletal:         General: No tenderness or deformity  Cervical back: Neck supple  Lymphadenopathy:      Cervical: No cervical adenopathy  Skin:     General: Skin is warm and dry  Findings: No erythema or rash     Neurological:      Mental Status: She is alert and oriented to person, place, and time  Psychiatric:         Behavior: Behavior normal          Thought Content: Thought content normal           Lab Results   Component Value Date    WBC 8 95 08/01/2021    HGB 13 3 08/01/2021    HCT 41 7 08/01/2021    MCV 87 08/01/2021     08/01/2021     Lab Results   Component Value Date    CALCIUM 8 9 08/01/2021    K 4 2 08/01/2021    CO2 28 08/01/2021     08/01/2021    BUN 11 08/01/2021    CREATININE 0 95 08/01/2021     Lab Results   Component Value Date    ALT 29 08/01/2021    AST 19 08/01/2021    ALKPHOS 69 08/01/2021     No results found for: INR, PROTIME    CT abdomen pelvis with contrast    Result Date: 8/1/2021  Impression: Moderate fecal stasis throughout the large bowel  No signs of bowel obstruction or inflammation  Normal appendix  Workstation performed: [id number]       ASSESSMENT & PLAN:    Generalized abdominal pain  Secondary to constipation and fecal stasis  No evidence of any bowel pathology on the CT scan     -discussed with patient in detail about the CT scan findings and given reassurance  Other constipation  Patient with history of chronic constipation which appear to be idiopathic     -will put her on Linzess regularly    -advised about high-fiber diet, MiraLax and take plenty of liquids     -Schedule for colonoscopy  -High-fiber diet     -Patient was given instructions about the colonoscopy prep     -Patient was explained about  the risks and benefits of the procedure  Risks including but not limited to bleeding, infection, perforation were explained in detail  Also explained about less than 100% sensitivity with the exam and other alternatives  Family history of colonic polyps  Patient is at increased risk for colon cancer screening    Rule out colorectal lesions including polyps or malignancy     -colonoscopy    Gastroesophageal reflux disease without esophagitis  Gastroesophageal reflux disease - Patient has the symptoms of chronic acid reflux for a long time  Possible hiatal hernia or LES weakness  Should rule out Cole's esophagus because of chronic symptoms         -Patient was explained about the lifestyle and dietary modifications  Advised to avoid fatty foods, chocolates, caffeine, alcohol and any other triggering foods  Avoid eating for at least 3 hours before going to bed          -schedule for EGD

## 2021-09-10 NOTE — ASSESSMENT & PLAN NOTE
Gastroesophageal reflux disease - Patient has the symptoms of chronic acid reflux for a long time  Possible hiatal hernia or LES weakness  Should rule out Cole's esophagus because of chronic symptoms         -Patient was explained about the lifestyle and dietary modifications  Advised to avoid fatty foods, chocolates, caffeine, alcohol and any other triggering foods  Avoid eating for at least 3 hours before going to bed          -schedule for EGD

## 2021-09-10 NOTE — ASSESSMENT & PLAN NOTE
Secondary to constipation and fecal stasis  No evidence of any bowel pathology on the CT scan     -discussed with patient in detail about the CT scan findings and given reassurance

## 2021-09-10 NOTE — ASSESSMENT & PLAN NOTE
Patient with history of chronic constipation which appear to be idiopathic     -will put her on Linzess regularly    -advised about high-fiber diet, MiraLax and take plenty of liquids     -Schedule for colonoscopy  -High-fiber diet     -Patient was given instructions about the colonoscopy prep     -Patient was explained about  the risks and benefits of the procedure  Risks including but not limited to bleeding, infection, perforation were explained in detail  Also explained about less than 100% sensitivity with the exam and other alternatives  [FreeTextEntry1] : PFT: restrictive dysfunction without a significant bronchodilator response.  Lung volumes low with evidence of air trapping. DLCO normal when corrected for volumes.\par \par Exhaled nitric oxide: normal\par

## 2021-10-18 DIAGNOSIS — E03.9 ADULT HYPOTHYROIDISM: ICD-10-CM

## 2021-10-18 RX ORDER — LEVOTHYROXINE SODIUM 0.15 MG/1
150 TABLET ORAL DAILY
Qty: 60 TABLET | Refills: 0 | Status: SHIPPED | OUTPATIENT
Start: 2021-10-18 | End: 2021-11-17

## 2021-10-25 ENCOUNTER — OFFICE VISIT (OUTPATIENT)
Dept: ENDOCRINOLOGY | Facility: CLINIC | Age: 46
End: 2021-10-25
Payer: COMMERCIAL

## 2021-10-25 VITALS
TEMPERATURE: 96.1 F | HEART RATE: 68 BPM | SYSTOLIC BLOOD PRESSURE: 118 MMHG | DIASTOLIC BLOOD PRESSURE: 76 MMHG | HEIGHT: 64 IN | BODY MASS INDEX: 32.8 KG/M2 | WEIGHT: 192.1 LBS

## 2021-10-25 DIAGNOSIS — R68.89 FORGETFULNESS: ICD-10-CM

## 2021-10-25 DIAGNOSIS — E03.9 ACQUIRED HYPOTHYROIDISM: Primary | ICD-10-CM

## 2021-10-25 DIAGNOSIS — E55.9 VITAMIN D DEFICIENCY: ICD-10-CM

## 2021-10-25 DIAGNOSIS — R53.83 OTHER FATIGUE: ICD-10-CM

## 2021-10-25 DIAGNOSIS — E66.9 OBESITY (BMI 30.0-34.9): ICD-10-CM

## 2021-10-25 PROCEDURE — 1036F TOBACCO NON-USER: CPT | Performed by: INTERNAL MEDICINE

## 2021-10-25 PROCEDURE — 3008F BODY MASS INDEX DOCD: CPT | Performed by: INTERNAL MEDICINE

## 2021-10-25 PROCEDURE — 99214 OFFICE O/P EST MOD 30 MIN: CPT | Performed by: INTERNAL MEDICINE

## 2021-11-11 ENCOUNTER — TELEPHONE (OUTPATIENT)
Dept: PREADMISSION TESTING | Facility: HOSPITAL | Age: 46
End: 2021-11-11

## 2021-11-15 ENCOUNTER — TELEPHONE (OUTPATIENT)
Dept: GASTROENTEROLOGY | Facility: AMBULARY SURGERY CENTER | Age: 46
End: 2021-11-15

## 2021-11-15 ENCOUNTER — TELEMEDICINE (OUTPATIENT)
Dept: FAMILY MEDICINE CLINIC | Facility: CLINIC | Age: 46
End: 2021-11-15
Payer: COMMERCIAL

## 2021-11-15 VITALS — BODY MASS INDEX: 32.78 KG/M2 | HEIGHT: 64 IN | WEIGHT: 192 LBS

## 2021-11-15 DIAGNOSIS — R06.02 SHORTNESS OF BREATH: ICD-10-CM

## 2021-11-15 DIAGNOSIS — U07.1 COVID-19: Primary | ICD-10-CM

## 2021-11-15 DIAGNOSIS — R07.89 CHEST TIGHTNESS: ICD-10-CM

## 2021-11-15 DIAGNOSIS — E66.9 OBESITY (BMI 30.0-34.9): ICD-10-CM

## 2021-11-15 PROCEDURE — 1036F TOBACCO NON-USER: CPT | Performed by: FAMILY MEDICINE

## 2021-11-15 PROCEDURE — 3008F BODY MASS INDEX DOCD: CPT | Performed by: FAMILY MEDICINE

## 2021-11-15 PROCEDURE — 99214 OFFICE O/P EST MOD 30 MIN: CPT | Performed by: FAMILY MEDICINE

## 2021-11-15 RX ORDER — AMOXICILLIN 875 MG/1
TABLET, COATED ORAL
COMMUNITY
Start: 2021-09-15 | End: 2022-06-07 | Stop reason: ALTCHOICE

## 2021-12-20 ENCOUNTER — APPOINTMENT (OUTPATIENT)
Dept: LAB | Facility: CLINIC | Age: 46
End: 2021-12-20
Payer: COMMERCIAL

## 2021-12-20 DIAGNOSIS — E03.9 ACQUIRED HYPOTHYROIDISM: ICD-10-CM

## 2021-12-20 DIAGNOSIS — E55.9 VITAMIN D DEFICIENCY: ICD-10-CM

## 2021-12-20 DIAGNOSIS — R53.83 OTHER FATIGUE: ICD-10-CM

## 2021-12-20 LAB
25(OH)D3 SERPL-MCNC: 29.8 NG/ML (ref 30–100)
T4 FREE SERPL-MCNC: 0.99 NG/DL (ref 0.76–1.46)
TSH SERPL DL<=0.05 MIU/L-ACNC: 3.2 UIU/ML (ref 0.36–3.74)
VIT B12 SERPL-MCNC: 349 PG/ML (ref 100–900)

## 2021-12-20 PROCEDURE — 84443 ASSAY THYROID STIM HORMONE: CPT | Performed by: FAMILY MEDICINE

## 2021-12-20 PROCEDURE — 82306 VITAMIN D 25 HYDROXY: CPT

## 2021-12-20 PROCEDURE — 82607 VITAMIN B-12: CPT

## 2021-12-20 PROCEDURE — 36415 COLL VENOUS BLD VENIPUNCTURE: CPT | Performed by: FAMILY MEDICINE

## 2021-12-20 PROCEDURE — 84439 ASSAY OF FREE THYROXINE: CPT

## 2021-12-21 ENCOUNTER — TELEPHONE (OUTPATIENT)
Dept: ENDOCRINOLOGY | Facility: CLINIC | Age: 46
End: 2021-12-21

## 2022-01-21 ENCOUNTER — TELEPHONE (OUTPATIENT)
Dept: PREADMISSION TESTING | Facility: HOSPITAL | Age: 47
End: 2022-01-21

## 2022-01-24 DIAGNOSIS — E03.9 ADULT HYPOTHYROIDISM: ICD-10-CM

## 2022-01-24 RX ORDER — LEVOTHYROXINE SODIUM 0.15 MG/1
150 TABLET ORAL DAILY
Qty: 90 TABLET | Refills: 3 | Status: SHIPPED | OUTPATIENT
Start: 2022-01-24 | End: 2022-02-22 | Stop reason: SDUPTHER

## 2022-01-31 ENCOUNTER — ANESTHESIA EVENT (OUTPATIENT)
Dept: ANESTHESIOLOGY | Facility: HOSPITAL | Age: 47
End: 2022-01-31

## 2022-01-31 ENCOUNTER — ANESTHESIA (OUTPATIENT)
Dept: ANESTHESIOLOGY | Facility: HOSPITAL | Age: 47
End: 2022-01-31

## 2022-02-01 ENCOUNTER — TELEPHONE (OUTPATIENT)
Dept: GASTROENTEROLOGY | Facility: AMBULARY SURGERY CENTER | Age: 47
End: 2022-02-01

## 2022-02-22 DIAGNOSIS — E03.9 ADULT HYPOTHYROIDISM: ICD-10-CM

## 2022-02-22 RX ORDER — LEVOTHYROXINE SODIUM 0.15 MG/1
150 TABLET ORAL DAILY
Qty: 90 TABLET | Refills: 3 | Status: SHIPPED | OUTPATIENT
Start: 2022-02-22

## 2022-05-12 ENCOUNTER — OFFICE VISIT (OUTPATIENT)
Dept: OBGYN CLINIC | Facility: CLINIC | Age: 47
End: 2022-05-12
Payer: COMMERCIAL

## 2022-05-12 VITALS
HEIGHT: 64 IN | BODY MASS INDEX: 32.61 KG/M2 | DIASTOLIC BLOOD PRESSURE: 82 MMHG | SYSTOLIC BLOOD PRESSURE: 120 MMHG | WEIGHT: 191 LBS

## 2022-05-12 DIAGNOSIS — N92.1 MENORRHAGIA WITH IRREGULAR CYCLE: ICD-10-CM

## 2022-05-12 DIAGNOSIS — N76.0 ACUTE VAGINITIS: Primary | ICD-10-CM

## 2022-05-12 PROBLEM — Z30.2 ENCOUNTER FOR STERILIZATION: Status: RESOLVED | Noted: 2021-05-03 | Resolved: 2022-05-12

## 2022-05-12 PROCEDURE — 81514 NFCT DS BV&VAGINITIS DNA ALG: CPT | Performed by: PHYSICIAN ASSISTANT

## 2022-05-12 PROCEDURE — 3008F BODY MASS INDEX DOCD: CPT | Performed by: PHYSICIAN ASSISTANT

## 2022-05-12 PROCEDURE — 99214 OFFICE O/P EST MOD 30 MIN: CPT | Performed by: PHYSICIAN ASSISTANT

## 2022-05-12 NOTE — PROGRESS NOTES
Assessment/Plan:    No problem-specific Assessment & Plan notes found for this encounter  Diagnoses and all orders for this visit:    Acute vaginitis  -     Molecular Vaginal Panel    Menorrhagia with irregular cycle  -     US pelvis complete w transvaginal; Future        Vaginal cultures done  Order entered for pelvic U/S  We will call with results  Counseled patient that she could be experiencing perimenopausal symptoms  It is not unusual for periods to be heavier and more painful during this time  Could also be scar tissue from prior surgeries  Will f/u in 2 weeks for yearly gyn exam   Will discuss symptoms and possible treatment further at that time  Call with problems in the interim  Subjective:      Patient ID: Sanjeev Hall is a 41 y o  female  Patient is here with multiple complaints  S/p IUD removal under anesthesia for lost strings, as well as tubal sterilization and removal of R dermoid cyst in May 2021  Since then, periods are regular, but are much heavier  Bleeding lasts 5-7 days  They are also accompanied by worsening dysmenorrhea  Experiencing vaginal itching and dryness  Has had recent vaginal pain during intercourse  She denies urinary symptoms, vaginal discharge, odor, abdominal pain, n/v, and fever/chills  She is overdue for a Pap  The following portions of the patient's history were reviewed and updated as appropriate: allergies, current medications, past family history, past medical history, past social history, past surgical history and problem list     Review of Systems   Constitutional: Negative for appetite change and unexpected weight change  Gastrointestinal: Negative for abdominal distention, abdominal pain, constipation, diarrhea, nausea and vomiting  Genitourinary: Positive for dyspareunia, menstrual problem (Menorrhagia; dysmenorrhea) and pelvic pain   Negative for difficulty urinating, dysuria, frequency, genital sores, hematuria, urgency, vaginal bleeding, vaginal discharge and vaginal pain  Objective:      /82 (BP Location: Left arm, Patient Position: Sitting, Cuff Size: Standard)   Ht 5' 4" (1 626 m)   Wt 86 6 kg (191 lb)   LMP 04/21/2022   BMI 32 79 kg/m²          Physical Exam  Vitals reviewed  Exam conducted with a chaperone present  Constitutional:       Appearance: Normal appearance  She is well-developed  Genitourinary:     General: Normal vulva  Pubic Area: No rash  Labia:         Right: No rash, tenderness, lesion or injury  Left: No rash, tenderness, lesion or injury  Vagina: Tenderness present  No vaginal discharge, erythema or bleeding  Cervix: Normal       Uterus: Normal        Adnexa: Right adnexa normal and left adnexa normal         Right: No mass, tenderness or fullness  Left: No mass, tenderness or fullness  Lymphadenopathy:      Lower Body: No right inguinal adenopathy  No left inguinal adenopathy  Skin:     General: Skin is warm and dry  Neurological:      Mental Status: She is alert and oriented to person, place, and time  Psychiatric:         Mood and Affect: Mood normal          Behavior: Behavior normal  Behavior is cooperative  Thought Content:  Thought content normal          Judgment: Judgment normal

## 2022-05-13 LAB
C GLABRATA DNA VAG QL NAA+PROBE: NEGATIVE
C KRUSEI DNA VAG QL NAA+PROBE: NEGATIVE
CANDIDA SP 6 PNL VAG NAA+PROBE: NEGATIVE
T VAGINALIS DNA VAG QL NAA+PROBE: NEGATIVE
VAGINOSIS/ITIS DNA PNL VAG PROBE+SIG AMP: NEGATIVE

## 2022-05-29 ENCOUNTER — HOSPITAL ENCOUNTER (OUTPATIENT)
Dept: ULTRASOUND IMAGING | Facility: HOSPITAL | Age: 47
Discharge: HOME | End: 2022-05-29
Payer: COMMERCIAL

## 2022-05-29 DIAGNOSIS — N92.1 MENORRHAGIA WITH IRREGULAR CYCLE: ICD-10-CM

## 2022-05-29 PROCEDURE — 76830 TRANSVAGINAL US NON-OB: CPT

## 2022-05-29 PROCEDURE — 76856 US EXAM PELVIC COMPLETE: CPT

## 2022-06-03 ENCOUNTER — TELEPHONE (OUTPATIENT)
Dept: OBGYN CLINIC | Facility: CLINIC | Age: 47
End: 2022-06-03

## 2022-06-03 DIAGNOSIS — E03.9 ACQUIRED HYPOTHYROIDISM: Primary | ICD-10-CM

## 2022-06-03 NOTE — TELEPHONE ENCOUNTER
Patient called regarding US results, mer did not leave a description as to what the ultrasound showed  I advised patient I would have a provider call her back on Monday

## 2022-06-06 NOTE — TELEPHONE ENCOUNTER
I called and spoke to pt regarding results and further recommendations  Details of discussion in result note from pelvic US  Pt agreeable to scheduling appt w/ dr Anamaria Solano for [facility] and further review of pelvic US and recommendations for tx of dysmenorrhea and menorrhagia to include TA vs IUD (mirena) vs ablation  Antonia Love MA took call to schedule

## 2022-06-06 NOTE — TELEPHONE ENCOUNTER
I tried calling pt to discuss results, pt stated at hair appt and asked If I could call her back, will try back later today

## 2022-06-08 ENCOUNTER — OFFICE VISIT (OUTPATIENT)
Dept: FAMILY MEDICINE CLINIC | Facility: CLINIC | Age: 47
End: 2022-06-08
Payer: COMMERCIAL

## 2022-06-08 ENCOUNTER — OFFICE VISIT (OUTPATIENT)
Dept: OBGYN CLINIC | Facility: CLINIC | Age: 47
End: 2022-06-08
Payer: COMMERCIAL

## 2022-06-08 ENCOUNTER — TELEPHONE (OUTPATIENT)
Dept: BEHAVIORAL/MENTAL HEALTH CLINIC | Facility: CLINIC | Age: 47
End: 2022-06-08

## 2022-06-08 VITALS
RESPIRATION RATE: 16 BRPM | HEART RATE: 80 BPM | SYSTOLIC BLOOD PRESSURE: 112 MMHG | BODY MASS INDEX: 32.61 KG/M2 | HEIGHT: 64 IN | OXYGEN SATURATION: 98 % | DIASTOLIC BLOOD PRESSURE: 64 MMHG | WEIGHT: 191 LBS

## 2022-06-08 VITALS
SYSTOLIC BLOOD PRESSURE: 104 MMHG | HEIGHT: 64 IN | DIASTOLIC BLOOD PRESSURE: 68 MMHG | WEIGHT: 191 LBS | BODY MASS INDEX: 32.61 KG/M2

## 2022-06-08 DIAGNOSIS — N92.1 MENORRHAGIA WITH IRREGULAR CYCLE: Primary | ICD-10-CM

## 2022-06-08 DIAGNOSIS — F32.1 CURRENT MODERATE EPISODE OF MAJOR DEPRESSIVE DISORDER WITHOUT PRIOR EPISODE (HCC): ICD-10-CM

## 2022-06-08 DIAGNOSIS — E66.9 OBESITY (BMI 30.0-34.9): ICD-10-CM

## 2022-06-08 DIAGNOSIS — Z71.85 VACCINE COUNSELING: ICD-10-CM

## 2022-06-08 DIAGNOSIS — Z12.11 COLON CANCER SCREENING: ICD-10-CM

## 2022-06-08 DIAGNOSIS — E03.9 ADULT HYPOTHYROIDISM: ICD-10-CM

## 2022-06-08 DIAGNOSIS — Z13.220 SCREENING FOR LIPID DISORDERS: ICD-10-CM

## 2022-06-08 DIAGNOSIS — Z12.31 BREAST CANCER SCREENING BY MAMMOGRAM: ICD-10-CM

## 2022-06-08 DIAGNOSIS — N83.201 CYST OF RIGHT OVARY: ICD-10-CM

## 2022-06-08 DIAGNOSIS — Z81.8 FAMILY HISTORY OF DEPRESSION: ICD-10-CM

## 2022-06-08 DIAGNOSIS — Z83.49 FAMILY HISTORY OF THYROID DISEASE: ICD-10-CM

## 2022-06-08 DIAGNOSIS — Z13.1 SCREENING FOR DIABETES MELLITUS: ICD-10-CM

## 2022-06-08 DIAGNOSIS — Z13.0 SCREENING FOR DEFICIENCY ANEMIA: ICD-10-CM

## 2022-06-08 DIAGNOSIS — Z00.00 ANNUAL PHYSICAL EXAM: Primary | ICD-10-CM

## 2022-06-08 DIAGNOSIS — Z83.71 FAMILY HISTORY OF COLONIC POLYPS: ICD-10-CM

## 2022-06-08 PROCEDURE — 88305 TISSUE EXAM BY PATHOLOGIST: CPT | Performed by: PATHOLOGY

## 2022-06-08 PROCEDURE — 3725F SCREEN DEPRESSION PERFORMED: CPT | Performed by: FAMILY MEDICINE

## 2022-06-08 PROCEDURE — 99214 OFFICE O/P EST MOD 30 MIN: CPT | Performed by: FAMILY MEDICINE

## 2022-06-08 PROCEDURE — 99396 PREV VISIT EST AGE 40-64: CPT | Performed by: FAMILY MEDICINE

## 2022-06-08 RX ORDER — TRANEXAMIC ACID 650 1/1
1300 TABLET ORAL 3 TIMES DAILY
Qty: 30 TABLET | Refills: 3 | Status: SHIPPED | OUTPATIENT
Start: 2022-06-08 | End: 2022-06-13

## 2022-06-08 NOTE — PROGRESS NOTES
Assessment/Plan:   Diagnoses and all orders for this visit:    Annual physical exam  - reviewed PMHx, PSHx, meds, allergies, FHx, Soc Hx and Sexual Hx  - UTD with Tdap   - UTD with COVID primary series but no Booster  - discussed diet and exercise - see below   - due for screening labs - script given   - UTD with STD screening at her Ob/Gyn's OV earlier today   - overdue for annual GYN exam  - UTD with PAP (3/2021)   - overdue for Mammo - script given   - due for Colon Ca screening - discussed options - pt will check what's covered by her insurance - script given for Cologuard and referred to GI   - UTD with Optho   - Overdue for Dental   - RTO in 1wk with labs for close f/u - pt aware and agreeable   Vaccine counseling    Obesity (BMI 30 0-34 9)  - BMI 32 8  - discussed diet and encouraged exercise  - educated that it takes 3500 shari to lose 1lb  - advised to cut down on carbs, 5 servings of fruits/veggies/day, increase water intake (65-80oz/water/day)   - appropriate weight loss goal for women = 0 5-1lb/week  - per the Heart Association - 150mins/exercise/week, but to lose and maintain weight 200-300mins/exercise/week     Adult hypothyroidism  -     TSH, 3rd generation with Free T4 reflex  - follows with Endo but has not been able to see specialist   - last TSH was 12/2021 - nml - on Levothyroxine 150mcg QAM   - (+) exhaustion, cold intolerance, brain fog  - RTO in 1wk with labs for close f/u - pt aware and agreeable   Family history of thyroid disease    Current moderate episode of major depressive disorder without prior episode ([facility] [address] )  -     Ambulatory Referral to [facility]; Future  -     Vitamin D 25 hydroxy; Future  - PHQ-9 score of 13  - denies SI/HI  - (+) strong FHx of mental illness   - could be 2/2 uncontrolled hypothyroidism   - referred to Therapist (msg sent to Tana Paul) and [employer]  com   - RTO in in 1wk with labs for close f/u - pt aware and agreeable   Family history of depression    Breast cancer screening by mammogram  -     Mammo screening bilateral w 3d & cad; Future  - last in 2017     Colon cancer screening  -     Ambulatory Referral to Gastroenterology; Future  -     Cologuard  - due for Colon Ca screening - discussed options - pt will check what's covered by her insurance - script given for Cologuard and referred to GI   Family history of colonic polyps    Screening for lipid disorders  -     Lipid panel; Future    Screening for deficiency anemia  -     CBC and differential; Future  -     Iron Panel (Includes Ferritin, Iron Sat%, Iron, and TIBC); Future    Screening for diabetes mellitus  -     Comprehensive metabolic panel; Future          Subjective:    Patient ID: Avi Gosselin is a 41 y o  female    Avi Gosselin is a 41 y o  female who presents to the office for an annual exam and f/u of chronic issues   1) Annual Exam    - PMHx: hypothyroidism, GERD, obesity (BMI 32 8 <-- 34)   - allergies: NKDA  - Meds: see med rec   - PSHx: tubal ligation   - FHx: M (Depression/Anxiety/Bipolar, Drug Abuse, Thyroid Dz, Heart Dz), F (Heart Dz, EtOH Abuse), Sons x2 (ADHD)   - Immunizations: UTD with Tdap, UTD with COVID primary series but no Booster   - GYN Hx: follows with [facility] - had an OV earlier today, last PAP was 3/2021  - Hm: last Mammo 2017, due for Colon Ca screening   - diet/exercise: started going to GYM, diet varied   - social: former smoker (quit in 2015 - 1/3PP/15yrs), denies vaping/illicits, rare EtOH   - sexual Hx: active with spouse, UTD with STD screening at her Ob/Gyn appt earlier today   - last vision: glasses, last Optho was last year  - last dental: >1yr  2) Hypothyroidism  - follows with Endo   - last TSH was 12/2021 - nml - on Levothyroxine 150mcg QAM   - (+) exhaustion, cold intolerance, brain fog  - denies thinning hair, brittle nails, constipation, dry skin   3) Depression   - (+) " and I are having issues"  - (+) "issues with boys"  - has been trying to get in with a Therapist   - PHQ-9 score 13   - denies SI/HI      The following portions of the patient's history were reviewed and updated as appropriate: allergies, current medications, past family history, past medical history, past social history, past surgical history and problem list     Review of Systems  as per HPI    Objective:  /64   Pulse 80   Resp 16   Ht 5' 4" (1 626 m)   Wt 86 6 kg (191 lb)   SpO2 98%   BMI 32 79 kg/m²    Physical Exam  Vitals reviewed  Constitutional:       General: She is not in acute distress  Appearance: Normal appearance  She is obese  She is not ill-appearing, toxic-appearing or diaphoretic  HENT:      Head: Normocephalic and atraumatic  Right Ear: External ear normal       Left Ear: External ear normal       Nose: Nose normal    Eyes:      General: No scleral icterus  Right eye: No discharge  Left eye: No discharge  Extraocular Movements: Extraocular movements intact  Conjunctiva/sclera: Conjunctivae normal    Cardiovascular:      Rate and Rhythm: Normal rate and regular rhythm  Heart sounds: Normal heart sounds  No murmur heard  No gallop  Pulmonary:      Effort: Pulmonary effort is normal       Breath sounds: Normal breath sounds  Abdominal:      Palpations: Abdomen is soft  Musculoskeletal:         General: Normal range of motion  Cervical back: Normal range of motion and neck supple  Right lower leg: No edema  Left lower leg: No edema  Skin:     General: Skin is warm  Neurological:      General: No focal deficit present  Mental Status: She is alert and oriented to person, place, and time  Psychiatric:         Attention and Perception: Attention and perception normal          Mood and Affect: Affect normal  Mood is depressed  Speech: Speech normal  She is communicative           Behavior: Behavior normal  Behavior is cooperative  Thought Content: Thought content normal  Thought content does not include homicidal or suicidal ideation  Thought content does not include homicidal or suicidal plan  Cognition and Memory: Cognition normal          Judgment: Judgment normal       Comments: PHQ-9 score of 13, denies SI/HI         BMI Counseling: Body mass index is 32 79 kg/m²  The BMI is above normal  Nutrition recommendations include 3-5 servings of fruits/vegetables daily  Exercise recommendations include exercising 3-5 times per week  Depression Screening Follow-up Plan: Patient's depression screening was positive with a PHQ-2 score of 3  Their PHQ-9 score was 13  Patient assessed for underlying major depression  They have no active suicidal ideations  Brief counseling provided and recommend additional follow-up/re-evaluation next office visit  Continue regular follow-up with their psychologist/therapist/psychiatrist who is managing their mental health condition(s)  Patient advised to follow-up with PCP for further management

## 2022-06-08 NOTE — PATIENT INSTRUCTIONS

## 2022-06-08 NOTE — TELEPHONE ENCOUNTER
This writer reached out to Felipe phillips at the request of her PCP Dr Jules Mayen, requesting support with connect with mental health therapy, as this writer is covering for clinical supervisor [place], [facility] as he is out of the office  This writer left a message for Felipe phillips, requesting that she e-mail this writer directly to assist in support with service coordination and/or referrals for mental health therapy, as her PCP verbalized that she has been on a wait list for (3) months awaiting services  This writer reviewed Renu's chart:     Physical Health Insurance: [address] Coverage: [employer]     Demographic Information: [place], [address] [facility]  This writer requested that Felipe phillips respond to this writer via e-mail to clarify preferences with male/ female as a therapy provider and/or telehealth verses in-person  This writer is aware that [facility] located @ [address], [address] [address], [address] [facility] ) [phone] is in network with Jeannie Frausto and taking on new patients for therapy services  PCP notified

## 2022-06-08 NOTE — PROGRESS NOTES
Assessment/Plan:   Diagnoses and all orders for this visit:      Adult hypothyroidism  -     TSH, 3rd generation with Free T4 reflex  - follows with Endo but has not been able to see specialist   - last TSH was 12/2021 - nml - on Levothyroxine 150mcg QAM   - (+) exhaustion, cold intolerance, brain fog  - RTO in 1wk with labs for close f/u - pt aware and agreeable   Family history of thyroid disease    Current moderate episode of major depressive disorder without prior episode ([facility] [address] )  -     Ambulatory Referral to [facility]; Future  -     Vitamin D 25 hydroxy; Future  - PHQ-9 score of 13  - denies SI/HI  - (+) strong FHx of mental illness   - could be 2/2 uncontrolled hypothyroidism   - referred to Therapist (msg sent to Fauzia Tam) and [employer]  com   - RTO in in 1wk with labs for close f/u - pt aware and agreeable   Family history of depression    Breast cancer screening by mammogram  -     Mammo screening bilateral w 3d & cad; Future  - last in 2017     Colon cancer screening  -     Ambulatory Referral to Gastroenterology; Future  -     Cologuard  - due for Colon Ca screening - discussed options - pt will check what's covered by her insurance - script given for Cologuard and referred to GI   Family history of colonic polyps            Subjective:    Patient ID: Katerina Phillips is a 41 y o  female    Katerina Phillips is a 41 y o  female who presents to the office for an annual exam and f/u of chronic issues   1) Annual Exam    - PMHx: hypothyroidism, GERD, obesity (BMI 32 8 <-- 34)   - allergies: NKDA  - Meds: see med rec   - PSHx: tubal ligation   - FHx: M (Depression/Anxiety/Bipolar, Drug Abuse, Thyroid Dz, Heart Dz), F (Heart Dz, EtOH Abuse), Sons x2 (ADHD)   - Immunizations: UTD with Tdap, UTD with COVID primary series but no Booster   - GYN Hx: follows with [facility] - had an OV earlier today, last PAP was 3/2021  - Hm: last Mammo 2017, due for Colon Ca screening   - diet/exercise: started going to GYM, diet varied   - social: former smoker (quit in 2015 - 1/3PP/15yrs), denies vaping/illicits, rare EtOH   - sexual Hx: active with spouse, UTD with STD screening at her Ob/Gyn appt earlier today   - last vision: glasses, last Optho was last year  - last dental: >1yr  2) Hypothyroidism  - follows with Endo   - last TSH was 12/2021 - nml - on Levothyroxine 150mcg QAM   - (+) exhaustion, cold intolerance, brain fog  - denies thinning hair, brittle nails, constipation, dry skin   3) Depression   - (+) " and I are having issues"  - (+) "issues with boys"  - has been trying to get in with a Therapist   - PHQ-9 score 13   - denies SI/HI      The following portions of the patient's history were reviewed and updated as appropriate: allergies, current medications, past family history, past medical history, past social history, past surgical history and problem list     Review of Systems  as per HPI    Objective:  /64   Pulse 80   Resp 16   Ht 5' 4" (1 626 m)   Wt 86 6 kg (191 lb)   SpO2 98%   BMI 32 79 kg/m²    Physical Exam  Vitals reviewed  Constitutional:       General: She is not in acute distress  Appearance: Normal appearance  She is obese  She is not ill-appearing, toxic-appearing or diaphoretic  HENT:      Head: Normocephalic and atraumatic  Right Ear: External ear normal       Left Ear: External ear normal       Nose: Nose normal    Eyes:      General: No scleral icterus  Right eye: No discharge  Left eye: No discharge  Extraocular Movements: Extraocular movements intact  Conjunctiva/sclera: Conjunctivae normal    Cardiovascular:      Rate and Rhythm: Normal rate and regular rhythm  Heart sounds: Normal heart sounds  No murmur heard  No gallop  Pulmonary:      Effort: Pulmonary effort is normal       Breath sounds: Normal breath sounds  Abdominal:      Palpations: Abdomen is soft  Musculoskeletal:         General: Normal range of motion  Cervical back: Normal range of motion and neck supple  Right lower leg: No edema  Left lower leg: No edema  Skin:     General: Skin is warm  Neurological:      General: No focal deficit present  Mental Status: She is alert and oriented to person, place, and time  Psychiatric:         Attention and Perception: Attention and perception normal          Mood and Affect: Affect normal  Mood is depressed  Speech: Speech normal  She is communicative  Behavior: Behavior normal  Behavior is cooperative  Thought Content: Thought content normal  Thought content does not include homicidal or suicidal ideation  Thought content does not include homicidal or suicidal plan  Cognition and Memory: Cognition normal          Judgment: Judgment normal       Comments: PHQ-9 score of 13, denies SI/HI         BMI Counseling: Body mass index is 32 79 kg/m²  The BMI is above normal  Nutrition recommendations include 3-5 servings of fruits/vegetables daily  Exercise recommendations include exercising 3-5 times per week  Depression Screening Follow-up Plan: Patient's depression screening was positive with a PHQ-2 score of 3  Their PHQ-9 score was 13  Patient assessed for underlying major depression  They have no active suicidal ideations  Brief counseling provided and recommend additional follow-up/re-evaluation next office visit  Continue regular follow-up with their psychologist/therapist/psychiatrist who is managing their mental health condition(s)  Patient advised to follow-up with PCP for further management

## 2022-06-09 ENCOUNTER — APPOINTMENT (OUTPATIENT)
Dept: LAB | Facility: CLINIC | Age: 47
End: 2022-06-09
Payer: COMMERCIAL

## 2022-06-09 ENCOUNTER — TELEPHONE (OUTPATIENT)
Dept: ENDOCRINOLOGY | Facility: CLINIC | Age: 47
End: 2022-06-09

## 2022-06-09 DIAGNOSIS — Z13.0 SCREENING FOR DEFICIENCY ANEMIA: ICD-10-CM

## 2022-06-09 DIAGNOSIS — Z13.1 SCREENING FOR DIABETES MELLITUS: ICD-10-CM

## 2022-06-09 DIAGNOSIS — F32.1 CURRENT MODERATE EPISODE OF MAJOR DEPRESSIVE DISORDER WITHOUT PRIOR EPISODE (HCC): ICD-10-CM

## 2022-06-09 DIAGNOSIS — Z13.220 SCREENING FOR LIPID DISORDERS: ICD-10-CM

## 2022-06-09 DIAGNOSIS — E03.9 ACQUIRED HYPOTHYROIDISM: ICD-10-CM

## 2022-06-09 LAB
25(OH)D3 SERPL-MCNC: 38.9 NG/ML (ref 30–100)
ALBUMIN SERPL BCP-MCNC: 4 G/DL (ref 3.5–5)
ALP SERPL-CCNC: 51 U/L (ref 34–104)
ALT SERPL W P-5'-P-CCNC: 15 U/L (ref 7–52)
ANION GAP SERPL CALCULATED.3IONS-SCNC: 4 MMOL/L (ref 4–13)
AST SERPL W P-5'-P-CCNC: 14 U/L (ref 13–39)
BASOPHILS # BLD AUTO: 0.08 THOUSANDS/ΜL (ref 0–0.1)
BASOPHILS NFR BLD AUTO: 1 % (ref 0–1)
BILIRUB SERPL-MCNC: 0.32 MG/DL (ref 0.2–1)
BUN SERPL-MCNC: 18 MG/DL (ref 5–25)
CALCIUM SERPL-MCNC: 8.8 MG/DL (ref 8.4–10.2)
CHLORIDE SERPL-SCNC: 106 MMOL/L (ref 96–108)
CHOLEST SERPL-MCNC: 152 MG/DL
CO2 SERPL-SCNC: 26 MMOL/L (ref 21–32)
CREAT SERPL-MCNC: 0.87 MG/DL (ref 0.6–1.3)
EOSINOPHIL # BLD AUTO: 0.31 THOUSAND/ΜL (ref 0–0.61)
EOSINOPHIL NFR BLD AUTO: 3 % (ref 0–6)
ERYTHROCYTE [DISTWIDTH] IN BLOOD BY AUTOMATED COUNT: 14.4 % (ref 11.6–15.1)
FERRITIN SERPL-MCNC: 49 NG/ML (ref 8–388)
GFR SERPL CREATININE-BSD FRML MDRD: 80 ML/MIN/1.73SQ M
GLUCOSE P FAST SERPL-MCNC: 91 MG/DL (ref 65–99)
HCT VFR BLD AUTO: 39.7 % (ref 34.8–46.1)
HDLC SERPL-MCNC: 43 MG/DL
HGB BLD-MCNC: 13 G/DL (ref 11.5–15.4)
IMM GRANULOCYTES # BLD AUTO: 0.07 THOUSAND/UL (ref 0–0.2)
IMM GRANULOCYTES NFR BLD AUTO: 1 % (ref 0–2)
IRON SATN MFR SERPL: 11 % (ref 15–50)
IRON SERPL-MCNC: 36 UG/DL (ref 50–170)
LDLC SERPL CALC-MCNC: 90 MG/DL (ref 0–100)
LYMPHOCYTES # BLD AUTO: 2.21 THOUSANDS/ΜL (ref 0.6–4.47)
LYMPHOCYTES NFR BLD AUTO: 20 % (ref 14–44)
MCH RBC QN AUTO: 28.6 PG (ref 26.8–34.3)
MCHC RBC AUTO-ENTMCNC: 32.7 G/DL (ref 31.4–37.4)
MCV RBC AUTO: 87 FL (ref 82–98)
MONOCYTES # BLD AUTO: 0.98 THOUSAND/ΜL (ref 0.17–1.22)
MONOCYTES NFR BLD AUTO: 9 % (ref 4–12)
NEUTROPHILS # BLD AUTO: 7.3 THOUSANDS/ΜL (ref 1.85–7.62)
NEUTS SEG NFR BLD AUTO: 66 % (ref 43–75)
NONHDLC SERPL-MCNC: 109 MG/DL
NRBC BLD AUTO-RTO: 0 /100 WBCS
PLATELET # BLD AUTO: 325 THOUSANDS/UL (ref 149–390)
PMV BLD AUTO: 9.7 FL (ref 8.9–12.7)
POTASSIUM SERPL-SCNC: 4.2 MMOL/L (ref 3.5–5.3)
PROT SERPL-MCNC: 7 G/DL (ref 6.4–8.4)
RBC # BLD AUTO: 4.54 MILLION/UL (ref 3.81–5.12)
SODIUM SERPL-SCNC: 136 MMOL/L (ref 135–147)
T4 FREE SERPL-MCNC: 1.25 NG/DL (ref 0.76–1.46)
TIBC SERPL-MCNC: 314 UG/DL (ref 250–450)
TRIGL SERPL-MCNC: 94 MG/DL
TSH SERPL DL<=0.05 MIU/L-ACNC: 2.37 UIU/ML (ref 0.45–4.5)
TSH SERPL DL<=0.05 MIU/L-ACNC: 2.44 UIU/ML (ref 0.45–4.5)
WBC # BLD AUTO: 10.95 THOUSAND/UL (ref 4.31–10.16)

## 2022-06-09 PROCEDURE — 84439 ASSAY OF FREE THYROXINE: CPT

## 2022-06-09 PROCEDURE — 82306 VITAMIN D 25 HYDROXY: CPT

## 2022-06-09 PROCEDURE — 58100 BIOPSY OF UTERUS LINING: CPT | Performed by: OBSTETRICS & GYNECOLOGY

## 2022-06-09 PROCEDURE — 36415 COLL VENOUS BLD VENIPUNCTURE: CPT

## 2022-06-09 PROCEDURE — 80061 LIPID PANEL: CPT

## 2022-06-09 PROCEDURE — 82728 ASSAY OF FERRITIN: CPT

## 2022-06-09 PROCEDURE — 84443 ASSAY THYROID STIM HORMONE: CPT

## 2022-06-09 PROCEDURE — 84443 ASSAY THYROID STIM HORMONE: CPT | Performed by: FAMILY MEDICINE

## 2022-06-09 PROCEDURE — 80053 COMPREHEN METABOLIC PANEL: CPT

## 2022-06-09 PROCEDURE — 83550 IRON BINDING TEST: CPT

## 2022-06-09 PROCEDURE — 83540 ASSAY OF IRON: CPT

## 2022-06-09 PROCEDURE — 85025 COMPLETE CBC W/AUTO DIFF WBC: CPT

## 2022-06-09 NOTE — TELEPHONE ENCOUNTER
Reached out to john steel for her to call back and give the info that you requested       Thanks   Radha

## 2022-06-09 NOTE — TELEPHONE ENCOUNTER
This pt called and left a vm on the integrations line, requesting a call back  Pt stated she has called twice with no response

## 2022-06-09 NOTE — PROGRESS NOTES
Assessment/Plan:     Diagnoses and all orders for this visit:    Menorrhagia with irregular cycle  -     Tranexamic Acid 650 MG TABS; Take 2 tablets (1,300 mg total) by mouth 3 (three) times a day for 5 days  -     Tissue Exam    Cyst of right ovary  -     US pelvis complete w transvaginal; Future      79-year-old female  Menorrhagia with irregular cycle   Prior 3 vaginal delivery   Bilateral salpingectomy for contraception  Ovarian cyst dermoid on the right/fibroid uterus  Plan   Endometrial biopsy   Trial tranexamic acid   Pelvic ultrasound review and discussed with patient   Repeat ultrasound in 3 month follow-up on ovarian cyst   Return to office in 3 month follow-up on bleeding as well as discuss ultrasound     Subjective:      Patient ID: Brock Benitez is a 41 y o  female  HPI  79-year-old female presents to the office today for endometrial biopsy secondary to heavy vaginal bleeding  Lasted for 5-7 day very heavy  Patient has prior for vaginal delivery bilateral salpingectomy for contraception patient has ultrasound done results review and discussed with patient        UTERUS:  The uterus is anteverted in position, measuring 10 9 x 4 7 x 6 2 cm  Small intramural nodule measures 1 1 cm posteriorly, previously 1 6 cm  A myometrial calcification is also unchanged posteriorly  A submucosal nodule was more ill-defined on the left measuring 2 4 x 2 1 x 2 4 cm not seen previously with some increase vascularity noted  Associated with this heterogeneous area is a small hypoechoic area measuring 1 5 cm also present anteriorly     The cervix appears within normal limits      ENDOMETRIUM:    The endometrial echo complex has an AP caliber of 8 0 mm  Endometrium is only slightly heterogeneous        OVARIES/ADNEXA:  Right ovary:  4 8 x 3 6 x 2 7 cm  24 8 mL  No suspicious right ovarian abnormality  Echogenic nodule measures 1 5 x 1 4 x 1 9 cm in the right ovary previously measuring 5 9 x 3 6 x 4 3 cm    This demonstrated fat density on prior CT most likely represents a dermoid rather than endometrioma  A 2 adjacent cysts, more likely than septated cyst, are also seen in the right ovary measuring up to 2 cm together  Doppler flow within normal limits      Left ovary:  2 8 x 2 8 x 2 3 cm  9 3 mL  No suspicious left ovarian abnormality  Hemorrhagic cyst appears to have resolved noted previously  Doppler flow within normal limits      No suspicious adnexal mass or loculated collections  There is no free fluid      IMPRESSION:     Heterogeneous myometrium  Some areas most likely represent fibroids  More ill-defined area anteriorly was not visualized on the prior study and demonstrates some vascularity  An atypical myoma or adenomyoma is possible at this location      2 adjacent cysts versus a cyst with a thin septation measuring 2 cm in the right ovary      Right-sided ovarian dermoid     Interval follow-up is suggested in 2-3 menstrual cycles         The following portions of the patient's history were reviewed and updated as appropriate: allergies, current medications, past family history, past medical history, past social history, past surgical history and problem list     Review of Systems      Objective:      /68 (BP Location: Left arm, Patient Position: Sitting, Cuff Size: Adult)   Ht 5' 4" (1 626 m)   Wt 86 6 kg (191 lb)   BMI 32 79 kg/m²          Physical Exam    Endometrial biopsy    Date/Time: 6/9/2022 9:36 AM  Performed by: Christofer Falk MD  Authorized by: Christofer Falk MD   Universal Protocol:  Consent: Verbal consent obtained    Risks and benefits: risks, benefits and alternatives were discussed  Consent given by: patient  Patient understanding: patient states understanding of the procedure being performed  Patient consent: the patient's understanding of the procedure matches consent given  Procedure consent: procedure consent matches procedure scheduled  Patient identity confirmed: verbally with patient      Indication:     Indications:  Other disorder of menstruation and other abnormal bleeding from female genital tract    Procedure:     Procedure: endometrial biopsy with Pipelle      A bivalve speculum was placed in the vagina: yes      Cervix cleaned and prepped: yes      Specimen collected: specimen collected and sent to pathology      Patient tolerated procedure well with no complications: yes    Findings:     Cervix: normal      Adnexa: normal

## 2022-06-09 NOTE — TELEPHONE ENCOUNTER
----- Message from Ania Hull MD sent at 6/9/2022 11:14 AM EDT -----  Thyroid function tests , vitamin d within normal limits   Iron saturation, iron low-follow-up with primary care

## 2022-06-14 ENCOUNTER — TELEPHONE (OUTPATIENT)
Dept: PSYCHIATRY | Facility: CLINIC | Age: 47
End: 2022-06-14

## 2022-06-15 ENCOUNTER — TELEPHONE (OUTPATIENT)
Dept: PSYCHIATRY | Facility: CLINIC | Age: 47
End: 2022-06-15

## 2022-06-15 ENCOUNTER — OFFICE VISIT (OUTPATIENT)
Dept: FAMILY MEDICINE CLINIC | Facility: CLINIC | Age: 47
End: 2022-06-15
Payer: COMMERCIAL

## 2022-06-15 VITALS
DIASTOLIC BLOOD PRESSURE: 72 MMHG | HEART RATE: 79 BPM | WEIGHT: 191 LBS | SYSTOLIC BLOOD PRESSURE: 130 MMHG | HEIGHT: 64 IN | OXYGEN SATURATION: 99 % | BODY MASS INDEX: 32.61 KG/M2

## 2022-06-15 DIAGNOSIS — E66.9 OBESITY (BMI 30.0-34.9): ICD-10-CM

## 2022-06-15 DIAGNOSIS — Z83.49 FAMILY HISTORY OF THYROID DISEASE: ICD-10-CM

## 2022-06-15 DIAGNOSIS — E61.1 IRON DEFICIENCY: Primary | ICD-10-CM

## 2022-06-15 DIAGNOSIS — F32.1 CURRENT MODERATE EPISODE OF MAJOR DEPRESSIVE DISORDER WITHOUT PRIOR EPISODE (HCC): ICD-10-CM

## 2022-06-15 DIAGNOSIS — Z81.8 FAMILY HISTORY OF DEPRESSION: ICD-10-CM

## 2022-06-15 DIAGNOSIS — E03.9 ADULT HYPOTHYROIDISM: ICD-10-CM

## 2022-06-15 PROCEDURE — 3008F BODY MASS INDEX DOCD: CPT | Performed by: FAMILY MEDICINE

## 2022-06-15 PROCEDURE — 99214 OFFICE O/P EST MOD 30 MIN: CPT | Performed by: FAMILY MEDICINE

## 2022-06-15 RX ORDER — FERROUS SULFATE TAB EC 324 MG (65 MG FE EQUIVALENT) 324 (65 FE) MG
324 TABLET DELAYED RESPONSE ORAL
Qty: 180 TABLET | Refills: 0 | Status: SHIPPED | OUTPATIENT
Start: 2022-06-15

## 2022-06-15 NOTE — PATIENT INSTRUCTIONS
Iron Rich Diet   WHAT YOU NEED TO KNOW:   What is an iron-rich diet? An iron-rich diet includes foods that are good sources of iron  People need extra iron during childhood, adolescence (teenage years), and pregnancy  Iron is a mineral that your body needs to make hemoglobin  Hemoglobin is part of your blood and helps carry oxygen from your lungs to the rest of your body  Eat iron-rich foods and vitamin C every day to prevent iron deficiency anemia  Iron deficiency anemia can lead to other health problems in adults and growth or development problems in children  How much iron do I need each day? Males:      3to 1years old: 7 mg    3to 6years old: 10 mg    5to 15years old: 8 mg    15to 25years old: 11 mg    19 years and older: 8 mg    Females:      3to 1years old: 7 mg    3to 6years old: 10 mg    5to 15years old: 8 mg    15to 25years old: 15 mg    19 to 50 years: 18 mg    Over 46years old: 8 mg    Pregnant women:  27 mg    Which foods contain iron? Meat, fish, and poultry are good sources of iron  They contain heme iron, a form of iron that your body absorbs very well  Fruit, vegetables, eggs, and grains such as pasta, rice, and cereal also contain iron  They contain nonheme iron, a form of iron that is not absorbed as well as heme iron  You can absorb more iron from these foods by eating a food that is high in vitamin C at the same time  You can also absorb more nonheme iron by eating a food from the meat, fish, and poultry group at the same time  Fish and shellfish contain some mercury, a metal that can be harmful  Children and unborn babies are at higher risk for harm caused by mercury  Children and pregnant women should avoid eating fish high in mercury, such as shark and swordfish  They should also eat only fish that are lower in mercury, such as salmon, canned light tuna, and catfish  Limit the amount of low-mercury fish and shellfish you eat to less than 12 ounces per week      What are some iron-rich foods? Foods that contain 2 mg or more per serving:      3 ounces of cooked beef (allison, eye of round) or cooked turkey (dark meat)    ½ cup of beans (black, kidney, or lentil, or soybeans)    ½ cup of tofu    1 medium baked potato    1 cup of cooked artichoke or cooked spinach    ¾ cup of instant oatmeal    1 cup of corn flakes    Foods that contain 1 to 2 mg per serving:      3 ounces of chicken    3 ounces of pork    3 ounces of turkey (light meat)    3 ounces of light tuna    ½ cup of seedless, packed raisins    1 slice of whole-wheat or white bread       What are good sources of vitamin C? Eat a serving of vitamin C with any iron-rich food to help your body absorb more iron  The following fruits and vegetables are good sources of vitamin C:  1 cup of fresh orange juice (124 mg) or pink grapefruit juice (83 mg)    1 cup of strawberries (106 mg)    1 cup of diced cantaloupe (68 mg)    1 cup of sweet yellow pepper (283 mg)    1 cup of fresh, boiled broccoli (116 mg) or cooked brussels sprouts (97 mg)    1 cup of kale (53 mg)    1 cup of tomato juice (45 mg)       What other guidelines should I follow? Tea and coffee can decrease the amount of iron that your body absorbs from iron-rich foods  Drink coffee and tea separately from meals that contain iron-rich foods  Have foods and liquids high in calcium separately from iron-rich foods  Calcium prevents iron from being absorbed  Cow's milk and products made from it, such as cheese and yogurt, are high in calcium  Children older than 1 year only need about 24 ounces of cow's milk each day  Other foods high in calcium include leafy greens, green vegetables, almonds, and canned sardines  CARE AGREEMENT:   You have the right to help plan your care  Discuss treatment options with your healthcare provider to decide what care you want to receive  You always have the right to refuse treatment  The above information is an  only  It is not intended as medical advice for individual conditions or treatments  Talk to your doctor, nurse or pharmacist before following any medical regimen to see if it is safe and effective for you  © Copyright [employer] 2022 Information is for End User's use only and may not be sold, redistributed or otherwise used for commercial purposes   All illustrations and images included in CareNotes® are the copyrighted property of [employer] D [employer] M , [employer]  or [address] [place], [place] [address]christos sarita

## 2022-06-15 NOTE — PROGRESS NOTES
Assessment/Plan:   Diagnoses and all orders for this visit:    Iron deficiency  -     ferrous sulfate 324 (65 Fe) mg; Take 1 tablet (324 mg total) by mouth 2 (two) times a day before meals  - educational handout given re: Fe rich diet     Adult hypothyroidism  - nml TSH and T4  - cont Levothyroxine 150mcg QAM   - has a f/u appt scheduled with Endo   Family history of thyroid disease    Current moderate episode of major depressive disorder without prior episode ([facility] [address] )  - was seen in the office last week - PHQ-9 score of 13, denies SI/HI  - (+) strong FHx of mental illness   - has been playing phone-tag with Therapists and trying to schedule   - advised to be persistent re: scheduling with Therapist - also referred to [employer]  [facility]  - discussed starting SSRIs at today's visit and will hold off for now    - RTO in 2months for f/u - pt and  aware and agreeable   Family history of depression    Obesity (BMI 30 0-34 9)  - BMI 32 8  - discussed diet and encouraged exercise  - educated that it takes 3500 shari to lose 1lb  - advised to cut down on carbs, 5 servings of fruits/veggies/day, increase water intake (65-80oz/water/day)   - appropriate weight loss goal for women = 0 5-1lb/week  - per the [employer] Association - 150mins/exercise/week, but to lose and maintain weight 200-300mins/exercise/week           Subjective:    Patient ID: Sanjeev Hall is a 41 y o  female    HPI   41yo F presents to the office with her  for f/u   - reviewed labs done 6/9/2022 - nml TSH, (+) Fe deficiency   - has made changes to her diet - has cut back on red meat intake   - was seen in the office last week - PHQ-9 score of 13  - has been playing phone-tag with Therapists and trying to schedule         The following portions of the patient's history were reviewed and updated as appropriate: allergies, current medications, past family history, past medical history, past social history, past surgical history and problem list     Review of Systems  as per HPI    Objective:  /72   Pulse 79   Ht 5' 4" (1 626 m)   Wt 86 6 kg (191 lb)   SpO2 99%   BMI 32 79 kg/m²    Physical Exam  Vitals reviewed  Constitutional:       General: She is not in acute distress  Appearance: Normal appearance  She is not ill-appearing, toxic-appearing or diaphoretic  HENT:      Head: Normocephalic and atraumatic  Right Ear: External ear normal       Left Ear: External ear normal       Nose: Nose normal    Eyes:      General: No scleral icterus  Right eye: No discharge  Left eye: No discharge  Extraocular Movements: Extraocular movements intact  Conjunctiva/sclera: Conjunctivae normal    Pulmonary:      Effort: Pulmonary effort is normal  No respiratory distress  Musculoskeletal:         General: Normal range of motion  Neurological:      General: No focal deficit present  Mental Status: She is alert and oriented to person, place, and time  Psychiatric:         Attention and Perception: Attention normal          Mood and Affect: Mood and affect normal          Speech: Speech normal  She is communicative  Speech is not rapid and pressured  Behavior: Behavior normal  Behavior is cooperative  Thought Content: Thought content does not include homicidal or suicidal ideation  Thought content does not include homicidal or suicidal plan  Cognition and Memory: Cognition normal          Judgment: Judgment normal       Comments: PHQ-9 score of 13 (6/8/2022)        BMI Counseling: Body mass index is 32 79 kg/m²  The BMI is above normal  Nutrition recommendations include 3-5 servings of fruits/vegetables daily  Exercise recommendations include exercising 3-5 times per week

## 2022-06-16 NOTE — TELEPHONE ENCOUNTER
Pt returned call  In regards to telephone tag with desean   Who originally reached out to email inquiry confirmed with pt about wait list and that she would be placed ob wait list  Pt did not agree and wanted a call back from manager  Reach out to my manager in regards to this matter

## 2022-06-30 ENCOUNTER — TELEPHONE (OUTPATIENT)
Dept: PSYCHIATRY | Facility: CLINIC | Age: 47
End: 2022-06-30

## 2022-07-19 ENCOUNTER — TELEPHONE (OUTPATIENT)
Dept: PSYCHIATRY | Facility: CLINIC | Age: 47
End: 2022-07-19

## 2022-07-19 NOTE — TELEPHONE ENCOUNTER
contacted patient in regards to referral and attempts to add patient to proper waitlist  LVM for patient to contact intake dept

## 2022-07-26 ENCOUNTER — TELEPHONE (OUTPATIENT)
Dept: PSYCHIATRY | Facility: CLINIC | Age: 47
End: 2022-07-26

## 2022-07-26 NOTE — TELEPHONE ENCOUNTER
contacted patient in regards to referral and in attempts to ad patient to proper waitlist  LVM for patient to contact intake dept

## 2022-08-18 DIAGNOSIS — E03.9 ADULT HYPOTHYROIDISM: ICD-10-CM

## 2022-08-18 RX ORDER — LEVOTHYROXINE SODIUM 0.15 MG/1
150 TABLET ORAL DAILY
Qty: 90 TABLET | Refills: 2 | Status: SHIPPED | OUTPATIENT
Start: 2022-08-18

## 2022-09-23 DIAGNOSIS — E61.1 IRON DEFICIENCY: ICD-10-CM

## 2022-09-23 RX ORDER — FERROUS SULFATE TAB EC 324 MG (65 MG FE EQUIVALENT) 324 (65 FE) MG
TABLET DELAYED RESPONSE ORAL
Qty: 180 TABLET | Refills: 0 | Status: SHIPPED | OUTPATIENT
Start: 2022-09-23 | End: 2022-10-12

## 2022-10-09 DIAGNOSIS — E61.1 IRON DEFICIENCY: ICD-10-CM

## 2022-10-12 RX ORDER — FERROUS SULFATE TAB EC 324 MG (65 MG FE EQUIVALENT) 324 (65 FE) MG
TABLET DELAYED RESPONSE ORAL
Qty: 180 TABLET | Refills: 1 | Status: SHIPPED | OUTPATIENT
Start: 2022-10-12

## 2023-01-16 ENCOUNTER — HOSPITAL ENCOUNTER (EMERGENCY)
Facility: HOSPITAL | Age: 48
Discharge: HOME | End: 2023-01-16
Attending: EMERGENCY MEDICINE

## 2023-01-16 ENCOUNTER — APPOINTMENT (EMERGENCY)
Dept: RADIOLOGY | Facility: HOSPITAL | Age: 48
End: 2023-01-16

## 2023-01-16 VITALS
SYSTOLIC BLOOD PRESSURE: 128 MMHG | TEMPERATURE: 99.1 F | DIASTOLIC BLOOD PRESSURE: 58 MMHG | OXYGEN SATURATION: 99 % | HEART RATE: 78 BPM | RESPIRATION RATE: 16 BRPM

## 2023-01-16 DIAGNOSIS — S89.92XA LEFT KNEE INJURY, INITIAL ENCOUNTER: Primary | ICD-10-CM
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
## 2023-01-16 NOTE — Clinical Note
Raymundo Doir was seen and treated in our emergency department on 1/16/2023  Nonweightbearing on left knee    Diagnosis:     Oscar Mott    She may return on this date: If you have any questions or concerns, please don't hesitate to call        Caden Son MD    ______________________________           _______________          _______________  Hospital Representative                              Date                                Time

## 2023-01-16 NOTE — DISCHARGE INSTRUCTIONS
Apply a compressive ACE bandage  Rest and elevate the affected painful area  Apply cold compresses intermittently as needed  As pain recedes, begin normal activities slowly as tolerated    Call if symptoms persist
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
## 2023-01-16 NOTE — Clinical Note
Noel Barker was seen and treated in our emergency department on 1/16/2023  Nonweightbearing on left knee    Diagnosis:     Damian Wallace    She may return on this date: If you have any questions or concerns, please don't hesitate to call        Kirill Tovar MD    ______________________________           _______________          _______________  Hospital Representative                              Date                                Time

## 2023-01-17 ENCOUNTER — VBI (OUTPATIENT)
Dept: ADMINISTRATIVE | Facility: OTHER | Age: 48
End: 2023-01-17

## 2023-01-20 NOTE — TELEPHONE ENCOUNTER
Cheryle Gunnels    ED Visit Information     Ed visit date: 1-   Diagnosis Description:   Left knee injury, initial encounter    In Network? Yes Willie Landin  Discharge status: Home  Discharged with meds ? No  Number of ED visits to date: 1  ED Severity:3     Outreach Information    Outreach successful: Yes 1  Date letter mailed:n/a  Date Finalized:1-    Care Coordination    Follow up appointment with specialilty: no , Pt declined   Transportation issues ? No    Value [employer] type: 3 Day Outreach  Emergent necessity warranted by diagnosis: Yes  [facility] PCP: Yes  Transportation: Ambulance Transport  If able to choose an ED  Would you have chosen [facility]: Yes  Called PCP first?: No  Feels able to call PCP for urgent problems ?: Yes  Understands what emergencies can be handled by PCP ?: Yes  Ever any problems getting appointment with PCP for minor emergency/urgency problems?: Yes  Practice Contacted Patient ?: No  Pt had ED follow up with pcp/staff ?: No    Reason Patient went to ED instead of Urgent Care or PCP?: Perceived Severity of Illness  Urgent care Education?: No      01/20/2023 08:38 AM EST by Mary Polk MA 01/20/2023 08:38 AM EST by CHIP WingI Sabina Bazzichet (Self) [phone] ([phone] (Mobile)  [phone] (Mobile) Remove  - Call Complete    Personal communication with patient regarding recent ED visit on 1-  Patient stated that she is doing great  Patient declined PCP/SPEC follow-up at this time, states she does not feel as though it is needed  Patient does not meet OPCM criteria  Patient is aware of PCP after hour's on-call service, education not given  Patient was aware of nearest [facility] urgent care facility, education not given EMS transport to ED due to severity of pain   No EMS transport paperwork found Attached to ED NoTe Patient does feel comfortable contacting PCP office for medical advice and does have issues with Subjective:     Chief Complaint   Patient presents with    3 Month Follow-Up     pt is here for pre-op exam for cataract surgery on 11/21/17 c the [address] with Dr. Sweetie Alcaraz Phone #[phone] Fax# [phone]      Ashley Villafuerte is a [de-identified] y.o.  male who presents to the office today for a preoperative consultation at the request of surgeon / Sweetie Alcaraz who plans on performing cataract surgery on 11/21/17. Cataract has been present for a few years and progressively effecting his vision.    - No hx of bleeding problems. He is on 81mg aspirin. Hx of porcine valve replacement. No hx of cardiac stent placement. - Diabetes - on metformin and amaryl. Well-controlled. No hx of low blood sugars  Lab Results   Component Value Date    LABA1C 6.5 09/18/2017     Lab Results   Component Value Date    .9 12/20/2016     No recent illnesses or significant exposures. He has had multiple prior surgeries, including knee replacement. No anesthesia issues in the past, tolerated it well.     Patient Active Problem List   Diagnosis    HTN (hypertension)    Osteoarthritis    GERD (gastroesophageal reflux disease)    Benign prostatic hyperplasia    Allergic rhinitis, seasonal    Hypertriglyceridemia    Screening for eye condition    Colon polyps- last colon neg 1/13    Needs flu shot- declines    Diabetic nephropathy- pos microalb 10/11, on ARB    Urge incontinence    Coronary artery disease, non-occlusive    SVT (supraventricular tachycardia)- S/P ablation 6/13    Left knee DJD    S/P aortic valve replacement with porcine valve    Medicare annual wellness visit, subsequent    Type 2 diabetes mellitus with diabetic nephropathy, without long-term current use of insulin ([address]    BPH with obstruction/lower urinary tract symptoms    AK (actinic keratosis)     Past Medical History:   Diagnosis Date    Allergic rhinitis, seasonal     Aortic regurgitation     2/12 ECHO EF 60%, mod AI getting a [address] [facility] or next day appointment, states appts are 3-4 days out

## 2023-01-26 ENCOUNTER — TELEPHONE (OUTPATIENT)
Dept: ADMINISTRATIVE | Facility: OTHER | Age: 48
End: 2023-01-26

## 2023-01-26 NOTE — TELEPHONE ENCOUNTER
01/26/23 11:51 AM    The patient was called and a message was left to call the ordering provider's office regarding an open order  Thank you    Nadia Prater  [facility]

## 2023-05-20 DIAGNOSIS — E03.9 ADULT HYPOTHYROIDISM: ICD-10-CM

## 2023-05-22 RX ORDER — LEVOTHYROXINE SODIUM 0.15 MG/1
TABLET ORAL
Qty: 90 TABLET | Refills: 2 | Status: SHIPPED | OUTPATIENT
Start: 2023-05-22

## 2023-09-08 DIAGNOSIS — E61.1 IRON DEFICIENCY: ICD-10-CM

## 2023-09-08 RX ORDER — FERROUS SULFATE TAB EC 324 MG (65 MG FE EQUIVALENT) 324 (65 FE) MG
324 TABLET DELAYED RESPONSE ORAL
Qty: 180 TABLET | Refills: 0 | OUTPATIENT
Start: 2023-09-08

## 2024-02-26 DIAGNOSIS — E03.9 ADULT HYPOTHYROIDISM: ICD-10-CM

## 2024-02-27 ENCOUNTER — TELEPHONE (OUTPATIENT)
Dept: PSYCHIATRY | Facility: CLINIC | Age: 49
End: 2024-02-27

## 2024-02-27 RX ORDER — LEVOTHYROXINE SODIUM 0.15 MG/1
TABLET ORAL
Qty: 90 TABLET | Refills: 2 | OUTPATIENT
Start: 2024-02-27

## 2024-02-27 NOTE — TELEPHONE ENCOUNTER
Called patient regarding non-referral wait list to see if they were still interested and to update chart info, but phone is no longer in service.

## 2024-02-27 NOTE — TELEPHONE ENCOUNTER
Tried calling patient to schedule follow up and let her know 30 day supply called in. The phone is no longer working no communication on file. Was unable to make contact.

## 2024-03-13 DIAGNOSIS — E03.9 ADULT HYPOTHYROIDISM: ICD-10-CM

## 2024-03-13 RX ORDER — LEVOTHYROXINE SODIUM 0.15 MG/1
150 TABLET ORAL DAILY
Qty: 90 TABLET | Refills: 1 | OUTPATIENT
Start: 2024-03-13

## 2024-03-13 NOTE — TELEPHONE ENCOUNTER
Patient stated that she now sees Love Draper, not Dr Rose.      Reason for call:   [x] Refill   [] Prior Auth  [] Other:     Office:   [x] PCP/Provider - Hannah Rose   [] Specialty/Provider -     Medication: levothyroxine 150 mcg tablet     Dose/Frequency: TAKE 1 TABLET BY MOUTH EVERY DAY     Quantity: 90 + 1 refill    Pharmacy: [facility]/pharmacy #[id number]  [place] [facility] [address]     Does the patient have enough for 3 days?   [x] Yes   [] No - Send as HP to POD

## 2024-04-03 DIAGNOSIS — E03.9 ADULT HYPOTHYROIDISM: ICD-10-CM

## 2024-04-03 NOTE — TELEPHONE ENCOUNTER
Patient states she has called several times today and has spoken to someone stating they would put a request in for prescription. Please review script, she would like her pcp to fill prescription not her endo she is no longer a patient there. Please call patient with an concerns.     Reason for call:   [x] Refill   [] Prior Auth  [] Other:     Office:   [x] PCP/Provider -   [] Specialty/Provider -     Medication: levothyroxine 150 mcg tablet     Dose/Frequency: TAKE 1 TABLET BY MOUTH EVERY DAY     Quantity: 90    Pharmacy: [facility]/pharmacy #[id number] - [place] - [address] [place] [address]    Does the patient have enough for 3 days?   [] Yes   [x] No - Send as HP to POD

## 2024-04-03 NOTE — TELEPHONE ENCOUNTER
I called and spoke to the patient and she was completely out of medication. I did call in # 30 to [facility]. Patient is agreeable to get labs done prior to her visit on 04/092024. IF you order patient is requesting a call so she can go to the lab on Sat morning to have them done.

## 2024-04-05 DIAGNOSIS — Z13.1 SCREENING FOR DIABETES MELLITUS: ICD-10-CM

## 2024-04-05 DIAGNOSIS — E61.1 IRON DEFICIENCY: ICD-10-CM

## 2024-04-05 DIAGNOSIS — Z83.49 FAMILY HISTORY OF THYROID DISEASE: ICD-10-CM

## 2024-04-05 DIAGNOSIS — Z13.220 SCREENING FOR LIPID DISORDERS: ICD-10-CM

## 2024-04-05 DIAGNOSIS — E03.9 ADULT HYPOTHYROIDISM: Primary | ICD-10-CM

## 2024-04-05 RX ORDER — LEVOTHYROXINE SODIUM 0.15 MG/1
150 TABLET ORAL DAILY
Qty: 90 TABLET | Refills: 1 | OUTPATIENT
Start: 2024-04-05

## 2024-04-06 ENCOUNTER — APPOINTMENT (OUTPATIENT)
Dept: LAB | Facility: CLINIC | Age: 49
End: 2024-04-06
Payer: COMMERCIAL

## 2024-04-06 DIAGNOSIS — Z13.220 SCREENING FOR LIPID DISORDERS: ICD-10-CM

## 2024-04-06 DIAGNOSIS — E61.1 IRON DEFICIENCY: ICD-10-CM

## 2024-04-06 LAB
ALBUMIN SERPL BCP-MCNC: 4.4 G/DL (ref 3.5–5)
ALP SERPL-CCNC: 52 U/L (ref 34–104)
ALT SERPL W P-5'-P-CCNC: 13 U/L (ref 7–52)
ANION GAP SERPL CALCULATED.3IONS-SCNC: 7 MMOL/L (ref 4–13)
AST SERPL W P-5'-P-CCNC: 13 U/L (ref 13–39)
BASOPHILS # BLD AUTO: 0.11 THOUSANDS/ÂΜL (ref 0–0.1)
BASOPHILS NFR BLD AUTO: 1 % (ref 0–1)
BILIRUB SERPL-MCNC: 0.79 MG/DL (ref 0.2–1)
BUN SERPL-MCNC: 17 MG/DL (ref 5–25)
CALCIUM SERPL-MCNC: 9.1 MG/DL (ref 8.4–10.2)
CHLORIDE SERPL-SCNC: 107 MMOL/L (ref 96–108)
CHOLEST SERPL-MCNC: 174 MG/DL
CO2 SERPL-SCNC: 22 MMOL/L (ref 21–32)
CREAT SERPL-MCNC: 1.02 MG/DL (ref 0.6–1.3)
EOSINOPHIL # BLD AUTO: 0.28 THOUSAND/ÂΜL (ref 0–0.61)
EOSINOPHIL NFR BLD AUTO: 3 % (ref 0–6)
ERYTHROCYTE [DISTWIDTH] IN BLOOD BY AUTOMATED COUNT: 14.2 % (ref 11.6–15.1)
FERRITIN SERPL-MCNC: 83 NG/ML (ref 11–307)
GFR SERPL CREATININE-BSD FRML MDRD: 65 ML/MIN/1.73SQ M
GLUCOSE P FAST SERPL-MCNC: 91 MG/DL (ref 65–99)
HCT VFR BLD AUTO: 44.5 % (ref 34.8–46.1)
HDLC SERPL-MCNC: 53 MG/DL
HGB BLD-MCNC: 14.4 G/DL (ref 11.5–15.4)
IMM GRANULOCYTES # BLD AUTO: 0.09 THOUSAND/UL (ref 0–0.2)
IMM GRANULOCYTES NFR BLD AUTO: 1 % (ref 0–2)
IRON SATN MFR SERPL: 36 % (ref 15–50)
IRON SERPL-MCNC: 114 UG/DL (ref 50–212)
LDLC SERPL CALC-MCNC: 101 MG/DL (ref 0–100)
LYMPHOCYTES # BLD AUTO: 1.37 THOUSANDS/ÂΜL (ref 0.6–4.47)
LYMPHOCYTES NFR BLD AUTO: 15 % (ref 14–44)
MCH RBC QN AUTO: 28.2 PG (ref 26.8–34.3)
MCHC RBC AUTO-ENTMCNC: 32.4 G/DL (ref 31.4–37.4)
MCV RBC AUTO: 87 FL (ref 82–98)
MONOCYTES # BLD AUTO: 0.84 THOUSAND/ÂΜL (ref 0.17–1.22)
MONOCYTES NFR BLD AUTO: 9 % (ref 4–12)
NEUTROPHILS # BLD AUTO: 6.72 THOUSANDS/ÂΜL (ref 1.85–7.62)
NEUTS SEG NFR BLD AUTO: 71 % (ref 43–75)
NONHDLC SERPL-MCNC: 121 MG/DL
NRBC BLD AUTO-RTO: 0 /100 WBCS
PLATELET # BLD AUTO: 353 THOUSANDS/UL (ref 149–390)
PMV BLD AUTO: 9.1 FL (ref 8.9–12.7)
POTASSIUM SERPL-SCNC: 4.1 MMOL/L (ref 3.5–5.3)
PROT SERPL-MCNC: 8 G/DL (ref 6.4–8.4)
RBC # BLD AUTO: 5.1 MILLION/UL (ref 3.81–5.12)
SODIUM SERPL-SCNC: 136 MMOL/L (ref 135–147)
T4 FREE SERPL-MCNC: 0.34 NG/DL (ref 0.61–1.12)
TIBC SERPL-MCNC: 313 UG/DL (ref 250–450)
TRIGL SERPL-MCNC: 99 MG/DL
TSH SERPL DL<=0.05 MIU/L-ACNC: 34.32 UIU/ML (ref 0.45–4.5)
UIBC SERPL-MCNC: 199 UG/DL (ref 155–355)
WBC # BLD AUTO: 9.41 THOUSAND/UL (ref 4.31–10.16)

## 2024-04-06 PROCEDURE — 80061 LIPID PANEL: CPT

## 2024-04-06 PROCEDURE — 82728 ASSAY OF FERRITIN: CPT

## 2024-04-06 PROCEDURE — 36415 COLL VENOUS BLD VENIPUNCTURE: CPT | Performed by: FAMILY MEDICINE

## 2024-04-06 PROCEDURE — 84439 ASSAY OF FREE THYROXINE: CPT | Performed by: FAMILY MEDICINE

## 2024-04-06 PROCEDURE — 85025 COMPLETE CBC W/AUTO DIFF WBC: CPT | Performed by: FAMILY MEDICINE

## 2024-04-06 PROCEDURE — 84443 ASSAY THYROID STIM HORMONE: CPT | Performed by: FAMILY MEDICINE

## 2024-04-06 PROCEDURE — 83550 IRON BINDING TEST: CPT

## 2024-04-06 PROCEDURE — 80053 COMPREHEN METABOLIC PANEL: CPT | Performed by: FAMILY MEDICINE

## 2024-04-06 PROCEDURE — 83540 ASSAY OF IRON: CPT

## 2024-04-26 ENCOUNTER — TELEPHONE (OUTPATIENT)
Dept: FAMILY MEDICINE CLINIC | Facility: CLINIC | Age: 49
End: 2024-04-26

## 2024-04-26 NOTE — TELEPHONE ENCOUNTER
Left message for Kaylynn to call back in regards to the appt scheduled with Dr. Luther on 4/29.  Appt needs to be rescheduled with her pcp Dr. Draper.

## 2024-05-06 NOTE — TELEPHONE ENCOUNTER
Patient called back to reschedule her appointment with Dr. Draper to review labs and also refill levothyroxine.  She would like to know if it is possible to be added to her schedule any day this week as she is on vacation from work.   assistance was unavailable at the time.  Please advise patient.  Thank you!

## 2024-05-07 ENCOUNTER — APPOINTMENT (OUTPATIENT)
Dept: LAB | Facility: CLINIC | Age: 49
End: 2024-05-07
Payer: COMMERCIAL

## 2024-05-07 ENCOUNTER — OFFICE VISIT (OUTPATIENT)
Dept: FAMILY MEDICINE CLINIC | Facility: CLINIC | Age: 49
End: 2024-05-07
Payer: COMMERCIAL

## 2024-05-07 VITALS
WEIGHT: 189 LBS | OXYGEN SATURATION: 98 % | SYSTOLIC BLOOD PRESSURE: 122 MMHG | HEIGHT: 65 IN | HEART RATE: 87 BPM | DIASTOLIC BLOOD PRESSURE: 78 MMHG | BODY MASS INDEX: 31.49 KG/M2

## 2024-05-07 DIAGNOSIS — E01.0 THYROMEGALY: ICD-10-CM

## 2024-05-07 DIAGNOSIS — Z83.49 FAMILY HISTORY OF THYROID DISEASE: ICD-10-CM

## 2024-05-07 DIAGNOSIS — K90.41 GLUTEN INTOLERANCE: ICD-10-CM

## 2024-05-07 DIAGNOSIS — K59.09 OTHER CONSTIPATION: ICD-10-CM

## 2024-05-07 DIAGNOSIS — Z00.00 ANNUAL PHYSICAL EXAM: Primary | ICD-10-CM

## 2024-05-07 DIAGNOSIS — K21.9 GASTROESOPHAGEAL REFLUX DISEASE, UNSPECIFIED WHETHER ESOPHAGITIS PRESENT: ICD-10-CM

## 2024-05-07 DIAGNOSIS — Z12.31 ENCOUNTER FOR SCREENING MAMMOGRAM FOR MALIGNANT NEOPLASM OF BREAST: ICD-10-CM

## 2024-05-07 DIAGNOSIS — Z12.11 COLON CANCER SCREENING: ICD-10-CM

## 2024-05-07 DIAGNOSIS — E61.1 IRON DEFICIENCY: ICD-10-CM

## 2024-05-07 DIAGNOSIS — E03.9 ADULT HYPOTHYROIDISM: ICD-10-CM

## 2024-05-07 LAB
T4 FREE SERPL-MCNC: 0.66 NG/DL (ref 0.61–1.12)
TSH SERPL DL<=0.05 MIU/L-ACNC: 9.09 UIU/ML (ref 0.45–4.5)

## 2024-05-07 PROCEDURE — 84443 ASSAY THYROID STIM HORMONE: CPT | Performed by: FAMILY MEDICINE

## 2024-05-07 PROCEDURE — 84439 ASSAY OF FREE THYROXINE: CPT | Performed by: FAMILY MEDICINE

## 2024-05-07 PROCEDURE — 99214 OFFICE O/P EST MOD 30 MIN: CPT | Performed by: FAMILY MEDICINE

## 2024-05-07 PROCEDURE — 99396 PREV VISIT EST AGE 40-64: CPT | Performed by: FAMILY MEDICINE

## 2024-05-07 PROCEDURE — 36415 COLL VENOUS BLD VENIPUNCTURE: CPT | Performed by: FAMILY MEDICINE

## 2024-05-07 NOTE — PROGRESS NOTES
Assessment/Plan:   Diagnoses and all orders for this visit:    Annual physical exam  - reviewed PMHx, PSHx, meds, allergies, FHx, Soc Hx and Sexual Hx  - UTD with Tdap   - UTD with COVID primary series but no Booster  - discussed diet and exercise   - reviewed labs done 4/6/2024 -- see below   - overdue for annual GYN exam and Cervical Ca screening - advised to schedule   - overdue for Mammo - script given   - declined STD screening in the office today   - overdue for Colon Ca screening - discussed options and pt referred to GI for screening Cscope   - Overdue for Dental and Optho   - RTO in 1yr for annual exam - pt aware and agreeable     Adult hypothyroidism  -     TSH, 3rd generation with Free T4 reflex  - TSH (4/6/2024): 34.320   - T4: 0.34   - follows with Endo - last OV was >1yr ago   - pt notes that was off of her Levothryoxine 150mcg QAM for ~3wks prior to getting labs checked -- will repeat labs now that back on medications   - (+) thyromegaly appreciated on exam - will check thyroid US   - close f/u with Endo advised - pt and spouse aware and agreeable   Thyromegaly  -     US thyroid; Future  Other constipation  Family history of thyroid disease    Iron deficiency  - nml CBC and Fe panel     Gluten intolerance  - discussed common GERD triggers   - pt has been avoiding Gluten  - referred to GI for f/u of GERD and screening Cscope   Gastroesophageal reflux disease, unspecified whether esophagitis present  Colon cancer screening  -     Ambulatory Referral to Gastroenterology; Future    Encounter for screening mammogram for malignant neoplasm of breast  -     Mammo screening bilateral w 3d & cad; Future          Subjective:    Patient ID: Prachi Galo is a 49 y.o. female.  Prachi Galo is a 49 y.o. female who presents to the office with her  for an annual exam and f/u abnml labs   1) Annual Exam    - PMHx: hypothyroidism, GERD, obesity (BMI 31.9 <-- 32.8 <-- 34), Depression/Anxiety    -  "Specialists: Psych, Endo   - allergies: NKDA  - Meds: see med rec   - PSHx: tubal ligation   - FHx: M (Depression/Anxiety/Bipolar, Drug Abuse, Thyroid Dz, Heart Dz), F (Heart Dz, EtOH Abuse), Sons x2 (ADHD)   - Immunizations: UTD with Tdap, UTD with COVID primary series but no Booster   - GYN Hx: follows with [facility] - last PAP was 3/2021  - Hm: last Mammo 2017, overdue for Colon Ca screening   - diet/exercise: started going to GYM, diet varied   - social: former smoker (quit in 2015 - 1/3PP/15yrs), denies vaping/illicits, rare EtOH   - sexual Hx: active with spouse, declined STD screening   - overdue for Optho and Dental   2) Hypothyroidism  - follows with Endo - last OV was >1yr ago   - pt notes that was off of her Levothryoxine 150mcg QAM for ~3wks prior to getting labs checked   - (+) hot flashes   - denies F/C/N/V/CP/palpitations/SOB/wheezing/abd pain/LE edema           The following portions of the patient's history were reviewed and updated as appropriate: allergies, current medications, past family history, past medical history, past social history, past surgical history and problem list.    Review of Systems  as per HPI    Objective:  /78   Pulse 87   Ht 5' 4.5\" (1.638 m)   Wt 85.7 kg (189 lb)   SpO2 98%   BMI 31.94 kg/m²    Physical Exam  Vitals reviewed.   Constitutional:       General: She is not in acute distress.     Appearance: Normal appearance. She is not ill-appearing, toxic-appearing or diaphoretic.   HENT:      Head: Normocephalic and atraumatic.      Right Ear: External ear normal.      Left Ear: External ear normal.      Nose: Nose normal.   Eyes:      General: No scleral icterus.        Right eye: No discharge.         Left eye: No discharge.      Extraocular Movements: Extraocular movements intact.      Conjunctiva/sclera: Conjunctivae normal.   Neck:      Thyroid: Thyromegaly present.   Cardiovascular:      Rate and Rhythm: Normal rate and regular " rhythm.      Heart sounds: Normal heart sounds.   Pulmonary:      Effort: Pulmonary effort is normal. No respiratory distress.      Breath sounds: Normal breath sounds. No stridor. No wheezing, rhonchi or rales.   Abdominal:      Palpations: Abdomen is soft.   Musculoskeletal:         General: Normal range of motion.      Cervical back: Normal range of motion.      Right lower leg: No edema.      Left lower leg: No edema.   Skin:     General: Skin is warm.   Neurological:      General: No focal deficit present.      Mental Status: She is alert and oriented to person, place, and time.   Psychiatric:         Mood and Affect: Mood normal.         Behavior: Behavior normal.        BMI Counseling: Body mass index is 31.94 kg/m². The BMI is above normal. Nutrition recommendations include 3-5 servings of fruits/vegetables daily. Exercise recommendations include exercising 3-5 times per week.    Depression Screening Follow-up Plan: Patient's depression screening was positive with a PHQ-2 score of . Their PHQ-9 score was 0. Clinically patient does not have depression. No treatment is required.

## 2024-05-09 ENCOUNTER — OFFICE VISIT (OUTPATIENT)
Dept: OBGYN CLINIC | Facility: CLINIC | Age: 49
End: 2024-05-09
Payer: COMMERCIAL

## 2024-05-09 ENCOUNTER — HOSPITAL ENCOUNTER (OUTPATIENT)
Dept: ULTRASOUND IMAGING | Facility: HOSPITAL | Age: 49
Discharge: HOME | End: 2024-05-09
Payer: COMMERCIAL

## 2024-05-09 VITALS
WEIGHT: 192.4 LBS | HEIGHT: 65 IN | SYSTOLIC BLOOD PRESSURE: 138 MMHG | DIASTOLIC BLOOD PRESSURE: 92 MMHG | BODY MASS INDEX: 32.06 KG/M2

## 2024-05-09 DIAGNOSIS — Z01.411 ENCOUNTER FOR GYNECOLOGICAL EXAMINATION WITH ABNORMAL FINDING: Primary | ICD-10-CM

## 2024-05-09 DIAGNOSIS — E01.0 THYROMEGALY: ICD-10-CM

## 2024-05-09 DIAGNOSIS — R10.2 PELVIC PAIN: ICD-10-CM

## 2024-05-09 DIAGNOSIS — E03.9 ADULT HYPOTHYROIDISM: Primary | ICD-10-CM

## 2024-05-09 DIAGNOSIS — R79.89 ELEVATED TSH: ICD-10-CM

## 2024-05-09 PROCEDURE — 76536 US EXAM OF HEAD AND NECK: CPT

## 2024-05-09 PROCEDURE — G0145 SCR C/V CYTO,THINLAYER,RESCR: HCPCS | Performed by: STUDENT IN AN ORGANIZED HEALTH CARE EDUCATION/TRAINING PROGRAM

## 2024-05-09 PROCEDURE — G0476 HPV COMBO ASSAY CA SCREEN: HCPCS | Performed by: PHYSICIAN ASSISTANT

## 2024-05-09 PROCEDURE — G0124 SCREEN C/V THIN LAYER BY MD: HCPCS | Performed by: STUDENT IN AN ORGANIZED HEALTH CARE EDUCATION/TRAINING PROGRAM

## 2024-05-09 PROCEDURE — S0612 ANNUAL GYNECOLOGICAL EXAMINA: HCPCS | Performed by: PHYSICIAN ASSISTANT

## 2024-05-09 NOTE — PROGRESS NOTES
Assessment/Plan:    No problem-specific Assessment & Plan notes found for this encounter.       Diagnoses and all orders for this visit:    Encounter for gynecological examination with abnormal finding  -     Liquid-based pap, screening    Pelvic pain  -     US pelvis complete w transvaginal; Future        Pap done.  Order for mammogram already in Epic.  Order for pelvic U/S entered to evaluate pelvic pain; we will call with results.  Patient would like to try coconut oil for dryness.  F/u with endocrinology as planned.  Call if periods worsen or change.  If no problems, patient to return in 1 year for routine gyn care.    Subjective:      Patient ID: Prachi Galo is a 49 y.o. female.    Patient is here for yearly gyn exam.  States she is doing well overall.  Periods are somewhat irregular.  They occur once a month, but not on time.  Bleeding lasts for 3-4 days.  About every 3-4 months, she will have a heavy period with severe cramping.  This has been the case ever since her tubal sterilization in 2021. Patient also complains of intermittent L sided pelvic pain; has a history of ovarian cysts.  Also experiencing dryness with intercourse, hot flashes, and weight gain. Declines any hormonal treatment at this time.  Currently undergoing evaluation by endocrinologist for thyroid issues.  Denies bowel/bladder changes, bloating, abdominal pain, n/v, and change in appetite.    Has mammogram scheduled for June.  Patient is not performing self-breast exam.  Denies new masses, skin changes, nipple discharge, and pain/tenderness.      The following portions of the patient's history were reviewed and updated as appropriate: allergies, current medications, past family history, past medical history, past social history, past surgical history, and problem list.    Review of Systems   Constitutional:  Positive for diaphoresis (Hot flashes). Negative for appetite change and unexpected weight change.   Cardiovascular:         No  "masses, skin changes, nipple discharge, and pain/tenderness.   Gastrointestinal:  Negative for abdominal distention, abdominal pain, constipation, diarrhea, nausea and vomiting.   Genitourinary:  Positive for dyspareunia, menstrual problem (Menorrhagia) and pelvic pain (Intermittent L sided). Negative for difficulty urinating, dysuria, frequency, genital sores, hematuria, urgency, vaginal bleeding, vaginal discharge and vaginal pain.         Objective:      /92 (BP Location: Left arm, Patient Position: Sitting, Cuff Size: Adult)   Ht 5' 4.5\" (1.638 m)   Wt 87.3 kg (192 lb 6.4 oz)   LMP 04/08/2024 (Approximate)   BMI 32.52 kg/m²          Physical Exam  Vitals reviewed. Exam conducted with a chaperone present.   Constitutional:       Appearance: Normal appearance. She is well-developed.   Neck:      Thyroid: No thyromegaly.   Pulmonary:      Effort: Pulmonary effort is normal.   Chest:   Breasts:     Breasts are symmetrical.      Right: Normal. No swelling, bleeding, inverted nipple, mass, nipple discharge, skin change or tenderness.      Left: Normal. No swelling, bleeding, inverted nipple, mass, nipple discharge, skin change or tenderness.   Abdominal:      General: There is no distension.      Palpations: Abdomen is soft.      Tenderness: There is no abdominal tenderness.   Genitourinary:     General: Normal vulva.      Pubic Area: No rash.       Labia:         Right: No rash, tenderness, lesion or injury.         Left: No rash, tenderness, lesion or injury.       Vagina: Normal. No vaginal discharge, erythema, tenderness or bleeding.      Cervix: Normal.      Uterus: Normal.       Adnexa: Right adnexa normal and left adnexa normal.        Right: No mass, tenderness or fullness.          Left: No mass, tenderness or fullness.     Musculoskeletal:      Cervical back: Neck supple.   Lymphadenopathy:      Cervical: No cervical adenopathy.      Upper Body:      Right upper body: No supraclavicular or axillary " adenopathy.      Left upper body: No supraclavicular or axillary adenopathy.      Lower Body: No right inguinal adenopathy. No left inguinal adenopathy.   Skin:     General: Skin is warm and dry.   Neurological:      Mental Status: She is alert and oriented to person, place, and time.   Psychiatric:         Behavior: Behavior normal. Behavior is cooperative.         Thought Content: Thought content normal.         Judgment: Judgment normal.

## 2024-05-09 NOTE — PATIENT INSTRUCTIONS
Go for mammogram as scheduled.    Go for pelvic ultrasound.    Try coconut oil for dryness.    F/u with endocrinology as planned.    Call if periods worsen or change.

## 2024-05-16 ENCOUNTER — HOSPITAL ENCOUNTER (OUTPATIENT)
Dept: ULTRASOUND IMAGING | Facility: HOSPITAL | Age: 49
Discharge: HOME | End: 2024-05-16
Payer: COMMERCIAL

## 2024-05-16 DIAGNOSIS — R10.2 PELVIC PAIN: ICD-10-CM

## 2024-05-16 LAB
LAB AP GYN PRIMARY INTERPRETATION: ABNORMAL
Lab: ABNORMAL
PATH INTERP SPEC-IMP: ABNORMAL

## 2024-05-16 PROCEDURE — 76830 TRANSVAGINAL US NON-OB: CPT

## 2024-05-16 PROCEDURE — 76856 US EXAM PELVIC COMPLETE: CPT

## 2024-05-20 ENCOUNTER — TELEPHONE (OUTPATIENT)
Dept: OBGYN CLINIC | Facility: CLINIC | Age: 49
End: 2024-05-20

## 2024-05-20 NOTE — TELEPHONE ENCOUNTER
Spoke with patient regarding Pap results - ASCUS with negative HPV.  Will repeat Pap in 1 year.  Call with problems.

## 2024-05-21 ENCOUNTER — TELEPHONE (OUTPATIENT)
Age: 49
End: 2024-05-21

## 2024-05-21 NOTE — TELEPHONE ENCOUNTER
05/21/24  Screened by: Mariel Choe    Referring Provider     Pre- Screening:     There is no height or weight on file to calculate BMI.  Has patient been referred for a routine screening Colonoscopy? yes  Is the patient between 45-75 years old? yes      Previous Colonoscopy yes   If yes:    Date: 2022    Facility:     Reason:       Does the patient want to see a Gastroenterologist prior to their procedure OR are they having any GI symptoms? no    Has the patient been hospitalized or had abdominal surgery in the past 6 months? no    Does the patient use supplemental oxygen? no    Does the patient take Coumadin, Lovenox, Plavix, Elliquis, Xarelto, or other blood thinning medication? no    Has the patient had a stroke, cardiac event, or stent placed in the past year? no      If patient is between 45yrs - 49yrs, please advise patient that we will have to confirm benefits & coverage with their insurance company for a routine screening colonoscopy.

## 2024-05-21 NOTE — TELEPHONE ENCOUNTER
Scheduled date of colonoscopy (as of today): 7/25/24  Physician performing colonoscopy: BRIAN  Location of colonoscopy: [facility]  Bowel prep reviewed with patient: RUDI / MONICA  Instructions reviewed with patient by: SHASHANK  Clearances: N/A

## 2024-05-28 ENCOUNTER — TELEPHONE (OUTPATIENT)
Dept: FAMILY MEDICINE CLINIC | Facility: CLINIC | Age: 49
End: 2024-05-28

## 2024-05-28 NOTE — TELEPHONE ENCOUNTER
----- Message from Love Draper DO sent at 5/28/2024 12:20 PM EDT -----  No nodules but atrophic gland - advised f/u with Endo (pt has an appt scheduled 7/31/2024)

## 2024-05-29 ENCOUNTER — TELEPHONE (OUTPATIENT)
Dept: OBGYN CLINIC | Facility: CLINIC | Age: 49
End: 2024-05-29

## 2024-05-29 NOTE — TELEPHONE ENCOUNTER
Spoke with patient regarding pelvic U/S results.  Possible fibroids in uterus.  Endometrium measured 3 mm.  Dermoid cyst on R ovary is stable.  Will repeat imaging in 1 year.  Call if symptoms worsen or change.

## 2024-06-04 ENCOUNTER — OFFICE VISIT (OUTPATIENT)
Dept: VASCULAR SURGERY | Facility: CLINIC | Age: 49
End: 2024-06-04
Payer: COMMERCIAL

## 2024-06-04 VITALS
BODY MASS INDEX: 31.82 KG/M2 | HEART RATE: 77 BPM | RESPIRATION RATE: 18 BRPM | OXYGEN SATURATION: 98 % | HEIGHT: 65 IN | DIASTOLIC BLOOD PRESSURE: 84 MMHG | SYSTOLIC BLOOD PRESSURE: 132 MMHG | WEIGHT: 191 LBS

## 2024-06-04 DIAGNOSIS — I83.811 VARICOSE VEINS OF RIGHT LOWER EXTREMITY WITH PAIN: Primary | ICD-10-CM

## 2024-06-04 PROCEDURE — 99204 OFFICE O/P NEW MOD 45 MIN: CPT | Performed by: NURSE PRACTITIONER

## 2024-06-04 NOTE — PROGRESS NOTES
"Assessment/Plan:     49-year-old former smoker with hypothyroidism, anxiety, GERD, and HLD presents with complaints of restless legs, nocturnal cramping and dilated veins that itch.  Problem List Items Addressed This Visit       Varicose veins of right lower extremity with pain - Primary     - Presents with complaints of bilateral restless legs, nocturnal cramping \"charley horse\" and unsightly spider veins that swell and itch.  -Patient reports worsening with fluctuation of weight.   -Patient has tried compression past without relief  -Strong family history of venous disease on maternal side  -History of 5 pregnancies, last one 14 years ago  -Denies personal or family history of DVT  -Patient is adamant she stays adequately hydrated and does not help the cramping at night.   -Denies claudication, ischemic rest pain.  No wounds  -Walks long distances throughout the day without issue.  Works out regularly.  -Works long hours a day standing  -Palpable distal pulses bilaterally    Plan:  -Conservative medical management with daily use of medical grade compression stockings 20-30 mmHg.  On a.m., off in p.m.  Frequent ambulation   Leg elevation at rest   Moisturizer and diligent skin care to maintain skin integrity and prevent skin breakdown    -Bilateral lower extremity venous duplex with reflux measurements.   Return to office with vascular surgeon to review and make recs if indicated.            Relevant Orders    VAS Lower extremity venous insufficiency duplex, bilateral w/ measurements    Compression Stocking        Diagnoses and all orders for this visit:    Varicose veins of right lower extremity with pain  -     VAS Lower extremity venous insufficiency duplex, bilateral w/ measurements; Future  -     Compression Stocking          Subjective:      Patient ID: Prachi Galo is a 49 y.o. female    Patient is new to our practice and was self referred for varicose veins. Pt has itching spider veins in BLE. Pt has " darian horses in the RLE. Pt states she did try compression in the past, but states that they cause her discomfort.       Chief Complaint: itching veins, restless legs and cramping in RLE at night      HPI    Prachi Galo is seen for evaluation of: [x]Varicose veins/legs  [x]Spider veins/legs  []Spider veins/face  []Venous stasis ulcer  []Superficial thrombophlebitis  []Other -      She complains of []none  []bulging veins  [x]dilated veins  [x]discolored veins         There is []no edema              []right leg edema  []left leg edema       []bilateral lower extremity edema     There is   []no leg pain          []right leg pain  []left leg pain         []bilateral leg pain  []bilateral leg pain; L>R   [x]bilateral leg pain; R>L     Pain is described as [x]aching              [x]itching  []sharp                []burning  [x]throbbing         []stinging  []heavy                []dull  []other -      Symptoms have been ongoing for:  years   There is  []no pertinent medical history  []history of DVT  []PE  []superficial venous thrombosis     Prior treatment includes []none  []EVLT  [x]OTC stockings  []prescription compression stockings  []vein ligation  []vein stripping  []stab phlebectomy  []sclerotherapy injections  []Other -      Current treatment includes []none  [x]compression socks  []avoiding tight clothing  []leg elevation  []rest  []exercise  []weight management  []skin care  []periodic evaluation   []Other-     Treatment has been []effective  [x]ineffective     Review of Systems   Constitutional: Negative.    HENT: Negative.     Eyes: Negative.    Respiratory: Negative.     Cardiovascular:  Positive for leg swelling.        Painful veins   Gastrointestinal: Negative.    Endocrine: Negative.    Genitourinary: Negative.    Musculoskeletal: Negative.    Skin: Negative.    Allergic/Immunologic: Negative.    Neurological: Negative.    Hematological: Negative.    Psychiatric/Behavioral: Negative.     I have  "reviewed and made appropriate changes to the review of systems input by the medical assistant.         The following portions of the patient's history were reviewed and updated as appropriate: allergies, current medications, past family history, past medical history, past social history, past surgical history, and problem list.  Objective     Physical Exam  Vitals reviewed.   Constitutional:       General: She is not in acute distress.     Appearance: Normal appearance.   HENT:      Head: Normocephalic and atraumatic.   Cardiovascular:      Rate and Rhythm: Normal rate and regular rhythm.      Pulses: Normal pulses.           Dorsalis pedis pulses are 2+ on the right side and 2+ on the left side.   Pulmonary:      Effort: Pulmonary effort is normal. No respiratory distress.   Musculoskeletal:         General: Normal range of motion.      Right lower leg: No edema.      Left lower leg: No edema.   Skin:     General: Skin is warm.      Capillary Refill: Capillary refill takes less than 2 seconds.      Comments: BLE dilated reticular, spider telangiectasia clusters R>L   Neurological:      Mental Status: She is alert and oriented to person, place, and time.      Sensory: No sensory deficit.      Motor: No weakness.   Psychiatric:         Behavior: Behavior normal.         I have spent a total time of 40 minutes on 06/06/24 in caring for this patient including Risks and benefits of tx options, Instructions for management, Patient and family education, Importance of tx compliance, Risk factor reductions, Impressions, Documenting in the medical record, Reviewing / ordering tests, medicine, procedures  , and Obtaining or reviewing history  .    Objective:     Vitals:    06/04/24 1432   BP: 132/84   BP Location: Left arm   Patient Position: Sitting   Pulse: 77   Resp: 18   SpO2: 98%   Weight: 86.6 kg (191 lb)   Height: 5' 4.5\" (1.638 m)       Patient Active Problem List   Diagnosis    Acid reflux    Acquired hypothyroidism "    Varicose veins of right lower extremity with edema    Varicose veins of right lower extremity with pain    Blepharitis of upper and lower eyelids of both eyes    Former smoker    Anxiety    Pure hypercholesterolemia    Obesity (BMI 30.0-34.9)    Generalized abdominal pain    Other constipation    Gastroesophageal reflux disease without esophagitis    Family history of colonic polyps    Vitamin D deficiency    Other fatigue    Forgetfulness    COVID-19    Shortness of breath    Family history of thyroid disease    Current moderate episode of major depressive disorder without prior episode (HCC)    Family history of depression    Gluten intolerance    Thyromegaly       Past Surgical History:   Procedure Laterality Date    NO PAST SURGERIES      PELVIC LAPAROSCOPY Right 05/03/2021    Procedure: RIGHT LAPAROSCOPIC OVARIAN CYSTECTOMY;  Surgeon: Shira Benitez DO;  Location: [facility];  Service: Gynecology    WI LAPAROSCOPY W/RMVL ADNEXAL STRUCTURES Bilateral 05/03/2021    Procedure: SALPINGECTOMY, LAPAROSCOPIC;  Surgeon: Shira Benitez DO;  Location: [facility];  Service: Gynecology    WI LAPS ABD PRTM&OMENTUM DX W/WO SPEC BR/WA SPX N/A 05/03/2021    Procedure: LAPAROSCOPY DIAGNOSTIC;  Surgeon: Shira Benitez DO;  Location: [facility];  Service: Gynecology    WI REMOVAL INTRAUTERINE DEVICE IUD N/A 05/03/2021    Procedure: REMOVAL OF INTRAUTERINE DEVICE (IUD);  Surgeon: Shira Benitez DO;  Location: [facility];  Service: Gynecology    TUBAL LIGATION         Family History   Problem Relation Age of Onset    Depression Mother     Drug abuse Mother     Anxiety disorder Mother         anxiety and depression    Heart disease Mother         dx in 20's    Thyroid disease Mother     Bipolar disorder Mother     Heart failure Mother     Arthritis Mother     Suicide Attempts Mother     Heart disease Father         dx in 40's    Alcohol abuse Father     Heart attack Father     Alzheimer's disease  Maternal Grandmother     Dementia Maternal Grandmother     Arthritis Maternal Grandmother     Alcohol abuse Maternal Grandfather     Diabetes Maternal Grandfather         Alcohol related    No Known Problems Sister     No Known Problems Brother     Allergies Son     ADD / ADHD Son     Bipolar disorder Son     ADD / ADHD Son     Alcohol abuse Maternal Uncle     Alcohol abuse Maternal Uncle     Drug abuse Maternal Uncle     Breast cancer Neg Hx     Cervical cancer Neg Hx     Colon cancer Neg Hx     Ovarian cancer Neg Hx     Uterine cancer Neg Hx        Social History     Socioeconomic History    Marital status: /Civil Union     Spouse name: Not on file    Number of children: 4    Years of education: Not on file    Highest education level: Not on file   Occupational History    Occupation:    Tobacco Use    Smoking status: Former     Current packs/day: 0.00     Average packs/day: 0.3 packs/day for 5.0 years (1.3 ttl pk-yrs)     Types: Cigarettes     Start date: 2009     Quit date: 2014     Years since quittin.4    Smokeless tobacco: Former    Tobacco comments:     quit 3 years ago in      Quit as of 8 month ago (2024)   Vaping Use    Vaping status: Never Used   Substance and Sexual Activity    Alcohol use: Yes     Comment: Socially    Drug use: No    Sexual activity: Yes     Partners: Male     Birth control/protection: Female Sterilization     Comment: tubial ligation   Other Topics Concern    Not on file   Social History Narrative    Drinks 4 cups of coffee daily      Social Determinants of Health     Financial Resource Strain: Not on file   Food Insecurity: Not on file   Transportation Needs: Not on file   Physical Activity: Not on file   Stress: Not on file   Social Connections: Not on file   Intimate Partner Violence: Not on file   Housing Stability: Not on file       No Known Allergies      Current Outpatient Medications:     levothyroxine 150 mcg tablet, TAKE 1 TABLET  BY MOUTH EVERY DAY, Disp: 90 tablet, Rfl: 2    VITAMIN D PO, Take by mouth daily, Disp: , Rfl:     Vitals:    06/04/24 1432   BP: 132/84   Pulse: 77   Resp: 18   SpO2: 98%

## 2024-06-04 NOTE — PATIENT INSTRUCTIONS
-Conservative medical management with daily use of medical grade compression stockings 20-30 mmHg.  On a.m., off in p.m.  Frequent ambulation   Leg elevation at rest   Moisturizer and diligent skin care to maintain skin integrity and prevent skin breakdown    Sigvaris - athletic sleeve 20-30mmHg- what we talked about in office.     Bilateral lower extremity venous duplex with reflux measurements.   Return to office with vascular surgeon to review and make recs if indicated.     Venous Insufficiency   AMBULATORY CARE:   Venous insufficiency  is a condition that prevents blood from flowing out of your legs and back to your heart. Veins contain valves that help blood flow in one direction. Venous insufficiency means the valves do not close correctly or fully. Blood flows back and pools in your leg. This can cause problems such as varicose veins. Venous insufficiency may also be called chronic venous insufficiency or venous stasis.  Common signs and symptoms:   Visible veins on your legs that may be small and red or large, thick, and blue         Swelling in your ankles or calves         Changes in skin color, such as dark or purple skin    An ulcer (open sore) on your leg    Leg pain that is worse when you are menstruating (women) or when you stand, and better when you elevate your legs    Burning or itching    Cramps that happen at night    Thick, hard skin on your legs and ankles    Feeling of heaviness in your legs    Seek care immediately if:   You have a wound that does not heal or is infected.    You have an injury that has broken your skin and caused your varicose veins to bleed.    Your leg is swollen and hard.    You have pain in your leg that does not go away or gets worse.    Your legs or feet are turning blue or black.    Your leg feels warm, tender, and painful. It may look swollen and red.    Call your doctor if:   You have a fever.    You have varicose veins and they are painful.    You have new or  worsening leg pain, swelling, or redness.    You have new or worsening ulcers or other sores on your leg.    You have questions or concerns about your condition or care.    Treatment  may include any of the following:  Medicine  may be given to improve blood flow. The medicines may thin your blood or reduce swelling to help blood flow. You may also need medicine to treat a bacterial infection.    Ablation  is a procedure used to close varicose veins. A catheter is guided until it is near the vein. A device will then be guided to the area. The device may produce energy through radiofrequency or a laser. The energy creates heat that will close the blood vessel.    Sclerotherapy  is a procedure used to fade visible veins. Your healthcare provider will inject a liquid into a spider vein or varicose vein. The liquid causes irritation in the vein. The vein swells and sticks together. Your body will then absorb the vein.    Surgery  may be needed if other treatments do not work. Surgery may be used to repair a leg vein valve or to clip or tie off a vein so blood cannot flow through it. You may need to have a veins removed during surgery called stripping. Surgery may be used to bypass (go around) the damaged vein. Blood will flow through a vein transplanted from another part of your body.    Manage your symptoms:   Wear pressure stockings as directed.  Pressure stockings help keep blood from pooling in your leg veins. Your healthcare provider can prescribe stockings that are right for you. Do not buy over-the-counter pressure stockings unless your healthcare provider says it is okay. They may not fit correctly or may have elastic that cuts off your circulation. Ask your healthcare provider when to start wearing pressure stockings and how long to wear them each day.         Do not sit or stand for long periods of time.  If you have to sit for a long time, flex and extend your legs, feet, and ankles. Do this about 10 times  every 30 minutes to help keep blood flowing. If you have to stand for a long time, take breaks and sit with your legs elevated.    Elevate your legs.  Elevate your legs above the level of your heart to reduce swelling. Your healthcare provider may recommend that you keep your legs elevated for 30 minutes at a time. You may need to do this 3 to 4 times per day, or more if your healthcare provider recommends.         Do not smoke.  Nicotine and other chemicals in cigarettes and cigars can cause blood vessel damage. Ask your healthcare provider for information if you currently smoke and need help to quit. E-cigarettes or smokeless tobacco still contain nicotine. Talk to your healthcare provider before you use these products.    Reach or maintain a healthy weight.  Extra weight can make venous insufficiency worse. Ask your healthcare provider what a healthy weight is for you. He or she can help you create a weight loss plan if you need to lose weight.         Exercise as directed.  Walking can help increase blood flow in your calves. Ask your healthcare provider how much exercise you need each day and which exercises are best for you.         Care for your skin.  Keep your skin clean. Do not use any soaps or lotions that may dry your skin. For example, do not use products that contain fragrance or alcohol. If you have a skin ulcer, your healthcare provider may recommend a wet-to-dry bandage. To do this, apply a wet bandage to your wound and allow it to dry. This will help remove drainage from your wound each time you change the bandage. Your healthcare provider will tell you how often to change your bandage and which kind of bandage to use. Check your wound for signs of infection, such as swelling or pus.    Go to physical therapy (PT) as directed.  A physical therapist can help you increase movement and range of motion in your legs.    Follow up with your doctor as directed:  Write down your questions so you remember  to ask them during your visits.  © Copyright [employer] 2023 Information is for End User's use only and may not be sold, redistributed or otherwise used for commercial purposes.  The above information is an  only. It is not intended as medical advice for individual conditions or treatments. Talk to your doctor, nurse or pharmacist before following any medical regimen to see if it is safe and effective for you.

## 2024-06-06 NOTE — ASSESSMENT & PLAN NOTE
"- Presents with complaints of bilateral restless legs, nocturnal cramping \"charley horse\" and unsightly spider veins that swell and itch.  -Patient reports worsening with fluctuation of weight.   -Patient has tried compression past without relief  -Strong family history of venous disease on maternal side  -History of 5 pregnancies, last one 14 years ago  -Denies personal or family history of DVT  -Patient is adamant she stays adequately hydrated and does not help the cramping at night.   -Denies claudication, ischemic rest pain.  No wounds  -Walks long distances throughout the day without issue.  Works out regularly.  -Works long hours a day standing  -Palpable distal pulses bilaterally    Plan:  -Conservative medical management with daily use of medical grade compression stockings 20-30 mmHg.  On a.m., off in p.m.  Frequent ambulation   Leg elevation at rest   Moisturizer and diligent skin care to maintain skin integrity and prevent skin breakdown    -Bilateral lower extremity venous duplex with reflux measurements.   Return to office with vascular surgeon to review and make recs if indicated.     "

## 2024-07-01 DIAGNOSIS — E03.9 ADULT HYPOTHYROIDISM: ICD-10-CM

## 2024-07-01 RX ORDER — LEVOTHYROXINE SODIUM 0.15 MG/1
150 TABLET ORAL DAILY
Qty: 30 TABLET | Refills: 0 | Status: SHIPPED | OUTPATIENT
Start: 2024-07-01

## 2024-07-11 ENCOUNTER — ANESTHESIA EVENT (OUTPATIENT)
Dept: ANESTHESIOLOGY | Facility: HOSPITAL | Age: 49
End: 2024-07-11

## 2024-07-11 ENCOUNTER — ANESTHESIA (OUTPATIENT)
Dept: ANESTHESIOLOGY | Facility: HOSPITAL | Age: 49
End: 2024-07-11

## 2024-07-11 ENCOUNTER — OFFICE VISIT (OUTPATIENT)
Dept: ENDOCRINOLOGY | Facility: CLINIC | Age: 49
End: 2024-07-11
Payer: COMMERCIAL

## 2024-07-11 VITALS
OXYGEN SATURATION: 98 % | SYSTOLIC BLOOD PRESSURE: 118 MMHG | HEIGHT: 65 IN | DIASTOLIC BLOOD PRESSURE: 78 MMHG | WEIGHT: 199.2 LBS | HEART RATE: 79 BPM | BODY MASS INDEX: 33.19 KG/M2

## 2024-07-11 DIAGNOSIS — E03.9 ACQUIRED HYPOTHYROIDISM: ICD-10-CM

## 2024-07-11 DIAGNOSIS — E03.9 ADULT HYPOTHYROIDISM: ICD-10-CM

## 2024-07-11 DIAGNOSIS — E55.9 VITAMIN D DEFICIENCY: Primary | ICD-10-CM

## 2024-07-11 PROCEDURE — 99214 OFFICE O/P EST MOD 30 MIN: CPT | Performed by: NURSE PRACTITIONER

## 2024-07-11 RX ORDER — LEVOTHYROXINE SODIUM 137 UG/1
137 TABLET ORAL DAILY
Qty: 30 TABLET | Refills: 1 | Status: SHIPPED | OUTPATIENT
Start: 2024-07-11

## 2024-07-11 NOTE — ASSESSMENT & PLAN NOTE
Not currently on levothyroxine.  Calculated weight based dosing between 126-144 mcg daily.   Will restart at levothyroxine 137 mcg daily and recheck labs in 6-8 weeks.  Follow up in 3 months.

## 2024-07-11 NOTE — PROGRESS NOTES
"  Established Patient Progress Note     CC: Endocrinology follow up visit    Impression & Plan:    Problem List Items Addressed This Visit          Endocrine    Acquired hypothyroidism     Not currently on levothyroxine.  Calculated weight based dosing between 126-144 mcg daily.   Will restart at levothyroxine 137 mcg daily and recheck labs in 6-8 weeks.  Follow up in 3 months.          Relevant Medications    levothyroxine (Euthyrox) 137 mcg tablet       Other    Vitamin D deficiency - Primary     Currently on vitamin D3 1000 IU daily.  Recommend rechecking vitamin D 25 hydroxy level.          Relevant Orders    Vitamin D 25 hydroxy     Other Visit Diagnoses       Adult hypothyroidism        Relevant Medications    levothyroxine (Euthyrox) 137 mcg tablet    Other Relevant Orders    TSH, 3rd generation    T4, free            History of Present Illness:     Prachi Galo is a 49 y.o. female with a history of hypothyroidism. Last seen in October 2021 by Dr. Rose.     For hypothyroidism, currently taking levothyroxine 150 mcg daily, regularly and properly. Fatigue, low energy level or weight gain.  Denies jitteriness, or tremors.  Denies tachycardia or palpitations.  Denies constipation.  Denies frequent headaches.  Denies temperature intolerances.     Thyroid ultrasound from May 2024 \"heterogeneous mildly atrophic thyroid gland without solid nodule\".    For vitamin D deficiency, continues taking vitamin D3 1000 IU daily.     Patient Active Problem List   Diagnosis    Acid reflux    Acquired hypothyroidism    Varicose veins of right lower extremity with edema    Varicose veins of right lower extremity with pain    Blepharitis of upper and lower eyelids of both eyes    Former smoker    Anxiety    Pure hypercholesterolemia    Obesity (BMI 30.0-34.9)    Generalized abdominal pain    Other constipation    Gastroesophageal reflux disease without esophagitis    Family history of colonic polyps    Vitamin D deficiency    " Other fatigue    Forgetfulness    COVID-19    Shortness of breath    Family history of thyroid disease    Current moderate episode of major depressive disorder without prior episode (HCC)    Family history of depression    Gluten intolerance    Thyromegaly      Past Medical History:   Diagnosis Date    Anxiety 4/16/2020    COVID-19 11/13/2021    tested positive    Disease of thyroid gland 2017    Headache(784.0) Various    History of ovarian cyst     Hypothyroidism     Obesity (BMI 30-39.9)       Past Surgical History:   Procedure Laterality Date    NO PAST SURGERIES      PELVIC LAPAROSCOPY Right 05/03/2021    Procedure: RIGHT LAPAROSCOPIC OVARIAN CYSTECTOMY;  Surgeon: Shira Benitez DO;  Location: [facility];  Service: Gynecology    MS LAPAROSCOPY W/RMVL ADNEXAL STRUCTURES Bilateral 05/03/2021    Procedure: SALPINGECTOMY, LAPAROSCOPIC;  Surgeon: Shira Benitez DO;  Location: [facility];  Service: Gynecology    MS LAPS ABD PRTM&OMENTUM DX W/WO SPEC BR/WA SPX N/A 05/03/2021    Procedure: LAPAROSCOPY DIAGNOSTIC;  Surgeon: Shira Benitez DO;  Location: [facility];  Service: Gynecology    MS REMOVAL INTRAUTERINE DEVICE IUD N/A 05/03/2021    Procedure: REMOVAL OF INTRAUTERINE DEVICE (IUD);  Surgeon: Shira Benitez DO;  Location: [facility];  Service: Gynecology    TUBAL LIGATION        Family History   Problem Relation Age of Onset    Depression Mother     Drug abuse Mother     Anxiety disorder Mother         anxiety and depression    Heart disease Mother         dx in 20's    Thyroid disease Mother     Bipolar disorder Mother     Heart failure Mother     Arthritis Mother     Suicide Attempts Mother     Heart disease Father         dx in 40's    Alcohol abuse Father     Heart attack Father     Alzheimer's disease Maternal Grandmother     Dementia Maternal Grandmother     Arthritis Maternal Grandmother     Alcohol abuse Maternal Grandfather     Diabetes Maternal Grandfather         Alcohol  "related    No Known Problems Sister     No Known Problems Brother     Allergies Son     ADD / ADHD Son     Bipolar disorder Son     ADD / ADHD Son     Alcohol abuse Maternal Uncle     Alcohol abuse Maternal Uncle     Drug abuse Maternal Uncle     Breast cancer Neg Hx     Cervical cancer Neg Hx     Colon cancer Neg Hx     Ovarian cancer Neg Hx     Uterine cancer Neg Hx      Social History     Tobacco Use    Smoking status: Former     Current packs/day: 0.00     Average packs/day: 0.3 packs/day for 5.0 years (1.3 ttl pk-yrs)     Types: Cigarettes     Start date: 2009     Quit date: 2014     Years since quittin.5    Smokeless tobacco: Former    Tobacco comments:     quit 3 years ago in      Quit as of 8 month ago (2024)   Substance Use Topics    Alcohol use: Yes     Comment: Socially     No Known Allergies    Current Outpatient Medications:     levothyroxine (Euthyrox) 137 mcg tablet, Take 1 tablet (137 mcg total) by mouth daily, Disp: 30 tablet, Rfl: 1    VITAMIN D PO, Take by mouth daily, Disp: , Rfl:     Review of Systems  See HPI.   All other systems reviewed and are negative.    Physical Exam:  Body mass index is 33.66 kg/m².  /78 (BP Location: Left arm, Patient Position: Sitting, Cuff Size: Large)   Pulse 79   Ht 5' 4.5\" (1.638 m)   Wt 90.4 kg (199 lb 3.2 oz)   SpO2 98%   BMI 33.66 kg/m²    Wt Readings from Last 3 Encounters:   24 90.4 kg (199 lb 3.2 oz)   24 86.6 kg (191 lb)   24 87.3 kg (192 lb 6.4 oz)        Physical Exam  Vitals reviewed.   Constitutional:       Appearance: Normal appearance.   Cardiovascular:      Rate and Rhythm: Normal rate and regular rhythm.      Pulses: Normal pulses.      Heart sounds: Normal heart sounds.   Pulmonary:      Effort: Pulmonary effort is normal.      Breath sounds: Normal breath sounds.   Skin:     General: Skin is warm and dry.      Capillary Refill: Capillary refill takes less than 2 seconds.   Neurological:      " General: No focal deficit present.      Mental Status: She is alert and oriented to person, place, and time.   Psychiatric:         Mood and Affect: Mood normal.         Behavior: Behavior normal.       Labs:   Component      Latest Ref Rng 6/9/2022 4/6/2024 5/7/2024   VITAMIN D, 25-HYDROXY      30.0 - 100.0 ng/mL 38.9      FREE T4      0.61 - 1.12 ng/dL 1.25  0.34 (L)  0.66    TSH 3RD GENERATON      0.450 - 4.500 uIU/mL 2.367  34.320 (H)  9.089 (H)        Discussed with the patient and all questioned fully answered. She will call me if any problems arise.    Follow-up appointment in 3 months.     Counseled patient on diagnostic results, prognosis, risk and benefit of treatment options, instruction for management, importance of treatment compliance, Risk  factor reduction and impressions    CAMILLA Calderón

## 2024-07-12 ENCOUNTER — HOSPITAL ENCOUNTER (OUTPATIENT)
Dept: VASCULAR ULTRASOUND | Facility: HOSPITAL | Age: 49
Discharge: HOME | End: 2024-07-12
Payer: COMMERCIAL

## 2024-07-12 DIAGNOSIS — I83.811 VARICOSE VEINS OF RIGHT LOWER EXTREMITY WITH PAIN: ICD-10-CM

## 2024-07-12 PROCEDURE — 93970 EXTREMITY STUDY: CPT

## 2024-07-16 PROCEDURE — 93970 EXTREMITY STUDY: CPT | Performed by: SURGERY

## 2024-07-29 ENCOUNTER — HOSPITAL ENCOUNTER (OUTPATIENT)
Dept: RADIOLOGY | Facility: HOSPITAL | Age: 49
Discharge: HOME | End: 2024-07-29
Attending: PODIATRIST
Payer: COMMERCIAL

## 2024-07-29 ENCOUNTER — OFFICE VISIT (OUTPATIENT)
Dept: PODIATRY | Facility: CLINIC | Age: 49
End: 2024-07-29
Payer: COMMERCIAL

## 2024-07-29 VITALS
BODY MASS INDEX: 32.49 KG/M2 | WEIGHT: 195 LBS | DIASTOLIC BLOOD PRESSURE: 90 MMHG | OXYGEN SATURATION: 98 % | HEART RATE: 83 BPM | HEIGHT: 65 IN | SYSTOLIC BLOOD PRESSURE: 130 MMHG

## 2024-07-29 DIAGNOSIS — M21.619 BUNION: ICD-10-CM

## 2024-07-29 DIAGNOSIS — M79.672 LEFT FOOT PAIN: ICD-10-CM

## 2024-07-29 DIAGNOSIS — M21.619 BUNION: Primary | ICD-10-CM

## 2024-07-29 PROCEDURE — 99203 OFFICE O/P NEW LOW 30 MIN: CPT | Performed by: PODIATRIST

## 2024-07-29 PROCEDURE — 73630 X-RAY EXAM OF FOOT: CPT

## 2024-07-31 NOTE — PROGRESS NOTES
Assessment/Plan:     The patient's clinical examination today significant for a moderate to severe bunion deformity of the left foot.  Tenderness is localized to the dorsal medial bony prominence of the first metatarsophalangeal joint.  There is decreased range of motion in the left great toe joint but without tenderness.  Pedal pulses are palpable.    X-rays of the patient's left foot taken today were personally viewed interpreted.  Findings were significant for a moderate to severe bunion deformity within the IM angle slightly over 14 degrees.    X-ray images were reviewed with the patient in detail.  Treatment options were discussed with the patient.  Conservative therapy would include accommodative shoe gear and splinting and padding.  NSAID therapy can be of some value for pain and swelling.  The patient is opting for surgical invention.  She would benefit from a Lapidus osteotomy due to the increased IM angle.  A distal metatarsal procedure with a long-arm Yonis and Corbin osteotomy can also be an option if the patient would like to see a quicker recuperation period.  However we did discuss that the long-term results may not hold up as well as a Lapidus.    She will consider her surgical options and returns her office when she is ready to proceed with surgical intervention.     Diagnoses and all orders for this visit:    Bunion  -     XR foot 3+ vw left; Future    Left foot pain          Subjective:     Patient ID: Prachi Galo is a 49 y.o. female.    Presents today for her initial consultation in regards to a painful left foot bunion deformity.  She states that this plan has been present for several years but is becoming increasingly painful.  She has tried wider shoes through the years.  It is starting to get harder to find shoes that fit comfortably.  She also finds it hard to perform her duties at work secondary to her left foot pain.      PAST MEDICAL HISTORY:  Past Medical History:   Diagnosis Date     Anxiety 2020    COVID-19 2021    tested positive    Disease of thyroid gland 2017    Headache(784.0) Various    History of ovarian cyst     Hypothyroidism     Obesity (BMI 30-39.9)        PAST SURGICAL HISTORY:  Past Surgical History:   Procedure Laterality Date    NO PAST SURGERIES      PELVIC LAPAROSCOPY Right 2021    Procedure: RIGHT LAPAROSCOPIC OVARIAN CYSTECTOMY;  Surgeon: Shira Benitez DO;  Location: [facility];  Service: Gynecology    W[place] LAPAROSCOPY W/RMVL ADNEXAL STRUCTURES Bilateral 2021    Procedure: SALPINGECTOMY, LAPAROSCOPIC;  Surgeon: Shira Benitez DO;  Location: [facility];  Service: Gynecology    WY LAPS ABD PRTM&OMENTUM DX W/WO SPEC BR/WA SPX N/A 2021    Procedure: LAPAROSCOPY DIAGNOSTIC;  Surgeon: Shira Benitez DO;  Location: [facility];  Service: Gynecology    W[place] REMOVAL INTRAUTERINE DEVICE IUD N/A 2021    Procedure: REMOVAL OF INTRAUTERINE DEVICE (IUD);  Surgeon: Shira Benitez DO;  Location: [facility];  Service: Gynecology    TUBAL LIGATION          ALLERGIES:  Patient has no known allergies.    MEDICATIONS:  Current Outpatient Medications   Medication Sig Dispense Refill    levothyroxine (Euthyrox) 137 mcg tablet Take 1 tablet (137 mcg total) by mouth daily 30 tablet 1    VITAMIN D PO Take by mouth daily       No current facility-administered medications for this visit.       SOCIAL HISTORY:  Social History     Socioeconomic History    Marital status: /Civil Union     Spouse name: None    Number of children: 4    Years of education: None    Highest education level: None   Occupational History    Occupation:    Tobacco Use    Smoking status: Former     Current packs/day: 0.00     Average packs/day: 0.3 packs/day for 5.0 years (1.3 ttl pk-yrs)     Types: Cigarettes     Start date: 2009     Quit date: 2014     Years since quittin.6    Smokeless tobacco: Former    Tobacco comments:     quit  3 years ago in 2015     Quit as of 8 month ago (5/9/2024)   Vaping Use    Vaping status: Never Used   Substance and Sexual Activity    Alcohol use: Yes     Comment: Socially    Drug use: No    Sexual activity: Yes     Partners: Male     Birth control/protection: Female Sterilization     Comment: tubial ligation   Other Topics Concern    None   Social History Narrative    Drinks 4 cups of coffee daily      Social Determinants of Health     Financial Resource Strain: Not on file   Food Insecurity: Not on file   Transportation Needs: Not on file   Physical Activity: Not on file   Stress: Not on file   Social Connections: Not on file   Intimate Partner Violence: Not on file   Housing Stability: Not on file        Review of Systems   Constitutional: Negative.    HENT: Negative.     Eyes: Negative.    Respiratory: Negative.     Cardiovascular: Negative.    Endocrine: Negative.    Musculoskeletal: Negative.    Neurological: Negative.    Hematological: Negative.    Psychiatric/Behavioral: Negative.           Objective:     Physical Exam  Constitutional:       Appearance: Normal appearance.   HENT:      Head: Normocephalic and atraumatic.      Nose: Nose normal.   Cardiovascular:      Pulses:           Dorsalis pedis pulses are 2+ on the right side and 2+ on the left side.        Posterior tibial pulses are 2+ on the right side and 2+ on the left side.   Pulmonary:      Effort: Pulmonary effort is normal.   Musculoskeletal:      Right foot: Bunion present.      Left foot: Decreased range of motion. Bunion present.   Feet:      Right foot:      Skin integrity: Skin integrity normal.      Left foot:      Skin integrity: Skin integrity normal.      Comments: The patient's clinical examination today significant for a moderate to severe bunion deformity of the left foot.  Tenderness is localized to the dorsal medial bony prominence of the first metatarsophalangeal joint.  There is decreased range of motion in the left great toe  joint but without tenderness.  Pedal pulses are palpable.  Skin:     General: Skin is warm.      Capillary Refill: Capillary refill takes less than 2 seconds.   Neurological:      General: No focal deficit present.      Mental Status: She is alert and oriented to person, place, and time.   Psychiatric:         Mood and Affect: Mood normal.         Behavior: Behavior normal.         Thought Content: Thought content normal.

## 2024-08-01 ENCOUNTER — TELEPHONE (OUTPATIENT)
Age: 49
End: 2024-08-01

## 2024-08-01 NOTE — TELEPHONE ENCOUNTER
Caller: Renu Galo    Doctor and/or Office: Dr. Mendez/Simone    #: [phone]    Escalation: Surgery/Wants to plan SX with Dr. Mendez-please call back and advise when the next available date for SX is and start the process. Thanks

## 2024-08-02 ENCOUNTER — TELEPHONE (OUTPATIENT)
Dept: OBGYN CLINIC | Facility: CLINIC | Age: 49
End: 2024-08-02

## 2024-08-02 NOTE — TELEPHONE ENCOUNTER
Please see below messages. Patient calling in again to schedule surgery. Please return call. Thank you

## 2024-08-02 NOTE — TELEPHONE ENCOUNTER
S/w pt to schedule surgery and p/o dates with .  Dates agreed upon.  Pt to stop into office for soap.  Pt aware that she needs to complete blood work and have a pre-op appt with her PCP, which she will schedule.  Pt aware that the hospital will call her the day before surgery, after 2 PM, to inform her of her arrival time on DOS.  Pt knows not to eat/drink anything after midnight the night before.  Washing instructions reviewed with pt.  Pt aware she needs a  on DOS.  Provided my direct call back number for pt to call with questions and to let me know when she will be in to pick-up soap.

## 2024-08-05 DIAGNOSIS — E03.9 ADULT HYPOTHYROIDISM: ICD-10-CM

## 2024-08-06 RX ORDER — LEVOTHYROXINE SODIUM 137 UG/1
137 TABLET ORAL DAILY
Qty: 100 TABLET | Refills: 1 | Status: SHIPPED | OUTPATIENT
Start: 2024-08-06

## 2024-08-19 ENCOUNTER — APPOINTMENT (OUTPATIENT)
Dept: LAB | Facility: CLINIC | Age: 49
End: 2024-08-19
Payer: COMMERCIAL

## 2024-08-19 DIAGNOSIS — E03.9 ADULT HYPOTHYROIDISM: ICD-10-CM

## 2024-08-19 DIAGNOSIS — E55.9 VITAMIN D DEFICIENCY: ICD-10-CM

## 2024-08-19 DIAGNOSIS — R79.89 ELEVATED TSH: ICD-10-CM

## 2024-08-19 LAB
25(OH)D3 SERPL-MCNC: 29 NG/ML (ref 30–100)
T4 FREE SERPL-MCNC: 0.58 NG/DL (ref 0.61–1.12)
T4 FREE SERPL-MCNC: 0.59 NG/DL (ref 0.61–1.12)
TSH SERPL DL<=0.05 MIU/L-ACNC: 16.34 UIU/ML (ref 0.45–4.5)

## 2024-08-19 PROCEDURE — 84439 ASSAY OF FREE THYROXINE: CPT

## 2024-08-19 PROCEDURE — 36415 COLL VENOUS BLD VENIPUNCTURE: CPT

## 2024-08-19 PROCEDURE — 82306 VITAMIN D 25 HYDROXY: CPT

## 2024-08-19 PROCEDURE — 84443 ASSAY THYROID STIM HORMONE: CPT

## 2024-08-20 DIAGNOSIS — E03.9 ACQUIRED HYPOTHYROIDISM: Primary | ICD-10-CM

## 2024-08-20 RX ORDER — LEVOTHYROXINE SODIUM 150 UG/1
150 TABLET ORAL DAILY
Qty: 90 TABLET | Refills: 2 | Status: SHIPPED | OUTPATIENT
Start: 2024-08-20

## 2024-09-03 ENCOUNTER — TELEPHONE (OUTPATIENT)
Dept: OBGYN CLINIC | Facility: CLINIC | Age: 49
End: 2024-09-03

## 2024-09-03 NOTE — TELEPHONE ENCOUNTER
S/w pt to remind her of pre-op blood work needing to be done as well as a pre-op appt with her PCP.  Pt also alerted to sign the surgical consent in MyChart.  Pt verbalized understanding.

## 2024-09-10 ENCOUNTER — OFFICE VISIT (OUTPATIENT)
Dept: FAMILY MEDICINE CLINIC | Facility: CLINIC | Age: 49
End: 2024-09-10
Payer: COMMERCIAL

## 2024-09-10 VITALS
SYSTOLIC BLOOD PRESSURE: 132 MMHG | WEIGHT: 201 LBS | HEIGHT: 65 IN | RESPIRATION RATE: 16 BRPM | BODY MASS INDEX: 33.49 KG/M2 | TEMPERATURE: 98.2 F | DIASTOLIC BLOOD PRESSURE: 72 MMHG | HEART RATE: 80 BPM | OXYGEN SATURATION: 98 %

## 2024-09-10 DIAGNOSIS — E03.9 ADULT HYPOTHYROIDISM: ICD-10-CM

## 2024-09-10 DIAGNOSIS — M21.612 BUNION, LEFT FOOT: ICD-10-CM

## 2024-09-10 DIAGNOSIS — Z01.818 PRE-OP EXAMINATION: Primary | ICD-10-CM

## 2024-09-10 DIAGNOSIS — I83.91 VARICOSE VEINS OF RIGHT LOWER EXTREMITY, UNSPECIFIED WHETHER COMPLICATED: ICD-10-CM

## 2024-09-10 DIAGNOSIS — M79.672 LEFT FOOT PAIN: ICD-10-CM

## 2024-09-10 PROCEDURE — 99214 OFFICE O/P EST MOD 30 MIN: CPT | Performed by: FAMILY MEDICINE

## 2024-09-10 NOTE — PROGRESS NOTES
Assessment/Plan:   Diagnoses and all orders for this visit:    Pre-op examination  Bunion, left foot  Left foot pain  - scheduled for BUNIONECTOMY LAPIPLASTY (Left: Foot) for treatment of bunion and L-foot pain on 9/27/2024 with Dr Mendez at [facility]   - reviewed PMHx, PSHx, meds, allergies, Fhx, Soc Hx   - UTD with Tdap   - UTD with COVID IMMs but no Booster   - has labs pending   - advised to STOP Vit D, MVI, NSAIDs, Fish Oil 1wk prior to surgery     PT IS AT ACCEPTABLE MEDICAL RISK FOR SURGICAL PROCEDURE    Adult hypothyroidism  - follows with Endo   - currently on Levothyroxine 150mcg QAM     Varicose veins of right lower extremity, unspecified whether complicated  - follows with Vascular   - s/p imaging and has f/u appt scheduled for 10/16/2024           Subjective:    Patient ID: Prachi Galo is a 49 y.o. female.  Prachi Galo is a 49 y.o. female who presents to the office for pre-op exam   - scheduled for BUNIONECTOMY LAPIPLASTY (Left: Foot) for treatment of bunion and L-foot pain on 9/27/2024 with Dr Mendez at [facility]   - PMHx: hypothyroidism, GERD, BMI~34, Depression/Anxiety, former smoker   - Specialists: Endo, Vascular   - allergies: NKDA  - Meds: see med rec   - PSHx: tubal ligation, pelvic  laproscopy (5/2021)   - FHx: M (Depression/Anxiety/Bipolar, Drug Abuse, Thyroid Dz, Heart Dz), F (Heart Dz, EtOH Abuse), Sons x2 (ADHD)   - Immunizations: UTD with Tdap, UTD with COVID primary series but no Booster   - diet/exercise: started going to GYM, diet varied   - social: former smoker (quit in 2015 - 1/3PP/15yrs), denies vaping/illicits, rare EtOH   - denies F/C/N/V/CP/palpitations/SOB/wheezing/abd pain/D/C/LE edema   - (+) L-foot pain - soaks it QHS         The following portions of the patient's history were reviewed and updated as appropriate: allergies, current medications, past family history, past medical history, past social history, past surgical history and problem list.    Review of  "Systems  as per HPI    Objective:  /72   Pulse 80   Temp 98.2 °F (36.8 °C) (Tympanic)   Resp 16   Ht 5' 4.5\" (1.638 m)   Wt 91.2 kg (201 lb)   SpO2 98%   BMI 33.97 kg/m²    Physical Exam  Vitals reviewed.   Constitutional:       General: She is not in acute distress.     Appearance: Normal appearance. She is not ill-appearing, toxic-appearing or diaphoretic.   HENT:      Head: Normocephalic and atraumatic.      Right Ear: External ear normal.      Left Ear: External ear normal.      Nose: Nose normal.   Eyes:      General: No scleral icterus.        Right eye: No discharge.         Left eye: No discharge.      Extraocular Movements: Extraocular movements intact.      Conjunctiva/sclera: Conjunctivae normal.   Cardiovascular:      Rate and Rhythm: Normal rate and regular rhythm.      Heart sounds: Normal heart sounds.   Pulmonary:      Effort: Pulmonary effort is normal. No respiratory distress.      Breath sounds: Normal breath sounds. No stridor. No wheezing, rhonchi or rales.   Abdominal:      Palpations: Abdomen is soft.   Musculoskeletal:      Cervical back: Normal range of motion.      Right lower leg: No edema.      Left lower leg: No edema.      Comments: (+) L-foot bunion and difficulty ambulating, (+) varicose veins    Skin:     General: Skin is warm.   Neurological:      General: No focal deficit present.      Mental Status: She is alert and oriented to person, place, and time.   Psychiatric:         Mood and Affect: Mood normal.         Behavior: Behavior normal.         "

## 2024-09-23 ENCOUNTER — APPOINTMENT (OUTPATIENT)
Dept: PREADMISSION TESTING | Facility: HOSPITAL | Age: 49
End: 2024-09-23
Payer: COMMERCIAL

## 2024-09-24 NOTE — PRE-PROCEDURE INSTRUCTIONS
Pre-Surgery Instructions:   Medication Instructions    levothyroxine 150 mcg tablet Take day of surgery.    VITAMIN D PO the patient has already stopped taking    Medication instructions for day surgery reviewed. Please use only a sip of water to take your instructed medications. Avoid all over the counter vitamins, supplements and NSAIDS for one week prior to surgery per anesthesia guidelines. Tylenol is ok to take as needed.     You will receive a call one business day prior to surgery with an arrival time and hospital directions. If your surgery is scheduled on a Monday, the hospital will be calling you on the Friday prior to your surgery. If you have not heard from anyone by 8pm, please call the hospital supervisor through the hospital  at [phone]. ([place] [phone] or [place] [phone]).    Do not eat or drink anything after midnight the night before your surgery, including candy, mints, lifesavers, or chewing gum. Do not drink alcohol 24hrs before your surgery. Try not to smoke at least 24hrs before your surgery.       Follow the pre surgery showering instructions as listed in the “My Surgical Experience Booklet” or otherwise provided by your surgeon's office. Do not use a blade to shave the surgical area 1 week before surgery. It is okay to use a clean electric clippers up to 24 hours before surgery. Do not apply any lotions, creams, including makeup, cologne, deodorant, or perfumes after showering on the day of your surgery. Do not use dry shampoo, hair spray, hair gel, or any type of hair products.     No contact lenses, eye make-up, or artificial eyelashes. Remove nail polish, including gel polish, and any artificial, gel, or acrylic nails if possible. Remove all jewelry including rings and body piercing jewelry.     Wear causal clothing that is easy to take on and off. Consider your type of surgery.    Keep any valuables, jewelry, piercings at home. Please bring any specially ordered  equipment (sling, braces) if indicated.    Arrange for a responsible person to drive you to and from the hospital on the day of your surgery. Please confirm the visitor policy for the day of your procedure when you receive your phone call with an arrival time.     Call the surgeon's office with any new illnesses, exposures, or additional questions prior to surgery.    Please reference your “My Surgical Experience Booklet” for additional information to prepare for your upcoming surgery.

## 2024-09-26 ENCOUNTER — APPOINTMENT (OUTPATIENT)
Dept: LAB | Facility: CLINIC | Age: 49
End: 2024-09-26
Payer: COMMERCIAL

## 2024-09-26 ENCOUNTER — ANESTHESIA EVENT (OUTPATIENT)
Dept: PERIOP | Facility: HOSPITAL | Age: 49
End: 2024-09-26
Payer: COMMERCIAL

## 2024-09-26 DIAGNOSIS — M21.619 BUNION: ICD-10-CM

## 2024-09-26 DIAGNOSIS — M79.672 LEFT FOOT PAIN: ICD-10-CM

## 2024-09-26 LAB
ALBUMIN SERPL BCG-MCNC: 4 G/DL (ref 3.5–5)
ALP SERPL-CCNC: 49 U/L (ref 34–104)
ALT SERPL W P-5'-P-CCNC: 13 U/L (ref 7–52)
ANION GAP SERPL CALCULATED.3IONS-SCNC: 5 MMOL/L (ref 4–13)
AST SERPL W P-5'-P-CCNC: 14 U/L (ref 13–39)
BASOPHILS # BLD AUTO: 0.07 THOUSANDS/ΜL (ref 0–0.1)
BASOPHILS NFR BLD AUTO: 1 % (ref 0–1)
BILIRUB SERPL-MCNC: 0.88 MG/DL (ref 0.2–1)
BUN SERPL-MCNC: 15 MG/DL (ref 5–25)
CALCIUM SERPL-MCNC: 9 MG/DL (ref 8.4–10.2)
CHLORIDE SERPL-SCNC: 106 MMOL/L (ref 96–108)
CO2 SERPL-SCNC: 25 MMOL/L (ref 21–32)
CREAT SERPL-MCNC: 0.84 MG/DL (ref 0.6–1.3)
EOSINOPHIL # BLD AUTO: 0.17 THOUSAND/ΜL (ref 0–0.61)
EOSINOPHIL NFR BLD AUTO: 2 % (ref 0–6)
ERYTHROCYTE [DISTWIDTH] IN BLOOD BY AUTOMATED COUNT: 13.4 % (ref 11.6–15.1)
GFR SERPL CREATININE-BSD FRML MDRD: 83 ML/MIN/1.73SQ M
GLUCOSE P FAST SERPL-MCNC: 87 MG/DL (ref 65–99)
HCT VFR BLD AUTO: 40.9 % (ref 34.8–46.1)
HGB BLD-MCNC: 13.1 G/DL (ref 11.5–15.4)
IMM GRANULOCYTES # BLD AUTO: 0.03 THOUSAND/UL (ref 0–0.2)
IMM GRANULOCYTES NFR BLD AUTO: 0 % (ref 0–2)
LYMPHOCYTES # BLD AUTO: 1.63 THOUSANDS/ΜL (ref 0.6–4.47)
LYMPHOCYTES NFR BLD AUTO: 23 % (ref 14–44)
MCH RBC QN AUTO: 28.1 PG (ref 26.8–34.3)
MCHC RBC AUTO-ENTMCNC: 32 G/DL (ref 31.4–37.4)
MCV RBC AUTO: 88 FL (ref 82–98)
MONOCYTES # BLD AUTO: 0.69 THOUSAND/ΜL (ref 0.17–1.22)
MONOCYTES NFR BLD AUTO: 10 % (ref 4–12)
NEUTROPHILS # BLD AUTO: 4.48 THOUSANDS/ΜL (ref 1.85–7.62)
NEUTS SEG NFR BLD AUTO: 64 % (ref 43–75)
NRBC BLD AUTO-RTO: 0 /100 WBCS
PLATELET # BLD AUTO: 357 THOUSANDS/UL (ref 149–390)
PMV BLD AUTO: 9.5 FL (ref 8.9–12.7)
POTASSIUM SERPL-SCNC: 4 MMOL/L (ref 3.5–5.3)
PROT SERPL-MCNC: 7.5 G/DL (ref 6.4–8.4)
RBC # BLD AUTO: 4.66 MILLION/UL (ref 3.81–5.12)
SODIUM SERPL-SCNC: 136 MMOL/L (ref 135–147)
WBC # BLD AUTO: 7.07 THOUSAND/UL (ref 4.31–10.16)

## 2024-09-26 PROCEDURE — 36415 COLL VENOUS BLD VENIPUNCTURE: CPT

## 2024-09-26 PROCEDURE — 85025 COMPLETE CBC W/AUTO DIFF WBC: CPT

## 2024-09-26 PROCEDURE — 80053 COMPREHEN METABOLIC PANEL: CPT

## 2024-09-27 ENCOUNTER — APPOINTMENT (OUTPATIENT)
Dept: RADIOLOGY | Facility: HOSPITAL | Age: 49
End: 2024-09-27
Payer: COMMERCIAL

## 2024-09-27 ENCOUNTER — ANESTHESIA (OUTPATIENT)
Dept: PERIOP | Facility: HOSPITAL | Age: 49
End: 2024-09-27
Payer: COMMERCIAL

## 2024-09-27 ENCOUNTER — HOSPITAL ENCOUNTER (OUTPATIENT)
Facility: HOSPITAL | Age: 49
Setting detail: OUTPATIENT SURGERY
Discharge: HOME | End: 2024-09-27
Attending: PODIATRIST | Admitting: PODIATRIST
Payer: COMMERCIAL

## 2024-09-27 VITALS
RESPIRATION RATE: 16 BRPM | HEIGHT: 64 IN | TEMPERATURE: 97.3 F | WEIGHT: 193.8 LBS | BODY MASS INDEX: 33.09 KG/M2 | OXYGEN SATURATION: 97 % | DIASTOLIC BLOOD PRESSURE: 68 MMHG | SYSTOLIC BLOOD PRESSURE: 118 MMHG | HEART RATE: 64 BPM

## 2024-09-27 DIAGNOSIS — Z98.890 POST-OPERATIVE STATE: Primary | ICD-10-CM

## 2024-09-27 DIAGNOSIS — M20.12 HALLUX VALGUS OF LEFT FOOT: ICD-10-CM

## 2024-09-27 LAB
EXT PREGNANCY TEST URINE: NEGATIVE
EXT. CONTROL: NORMAL

## 2024-09-27 PROCEDURE — 73630 X-RAY EXAM OF FOOT: CPT

## 2024-09-27 PROCEDURE — 81025 URINE PREGNANCY TEST: CPT | Performed by: PODIATRIST

## 2024-09-27 PROCEDURE — 73620 X-RAY EXAM OF FOOT: CPT

## 2024-09-27 PROCEDURE — C1713 ANCHOR/SCREW BN/BN,TIS/BN: HCPCS | Performed by: PODIATRIST

## 2024-09-27 PROCEDURE — 28297 COR HLX VLGS JT ARTHRD: CPT | Performed by: PODIATRIST

## 2024-09-27 PROCEDURE — 99024 POSTOP FOLLOW-UP VISIT: CPT | Performed by: PODIATRIST

## 2024-09-27 DEVICE — NITINOL STAPLE,10MM X 10/10MM
Type: IMPLANTABLE DEVICE | Site: FOOT | Status: FUNCTIONAL
Brand: ELASTIC STAPLES

## 2024-09-27 DEVICE — RAPID COMPRESSION IMPLANTS
Type: IMPLANTABLE DEVICE | Site: FOOT | Status: FUNCTIONAL
Brand: LAPIPLASTY SPEEDPLATE

## 2024-09-27 RX ORDER — KETOROLAC TROMETHAMINE 30 MG/ML
INJECTION, SOLUTION INTRAMUSCULAR; INTRAVENOUS AS NEEDED
Status: DISCONTINUED | OUTPATIENT
Start: 2024-09-27 | End: 2024-09-27

## 2024-09-27 RX ORDER — LIDOCAINE HYDROCHLORIDE 10 MG/ML
INJECTION, SOLUTION EPIDURAL; INFILTRATION; INTRACAUDAL; PERINEURAL AS NEEDED
Status: DISCONTINUED | OUTPATIENT
Start: 2024-09-27 | End: 2024-09-27 | Stop reason: HOSPADM

## 2024-09-27 RX ORDER — BUPIVACAINE HYDROCHLORIDE 2.5 MG/ML
INJECTION, SOLUTION EPIDURAL; INFILTRATION; INTRACAUDAL
Status: COMPLETED | OUTPATIENT
Start: 2024-09-27 | End: 2024-09-27

## 2024-09-27 RX ORDER — EPHEDRINE SULFATE 50 MG/ML
INJECTION INTRAVENOUS AS NEEDED
Status: DISCONTINUED | OUTPATIENT
Start: 2024-09-27 | End: 2024-09-27

## 2024-09-27 RX ORDER — MIDAZOLAM HYDROCHLORIDE 2 MG/2ML
INJECTION, SOLUTION INTRAMUSCULAR; INTRAVENOUS AS NEEDED
Status: DISCONTINUED | OUTPATIENT
Start: 2024-09-27 | End: 2024-09-27

## 2024-09-27 RX ORDER — MAGNESIUM HYDROXIDE 1200 MG/15ML
LIQUID ORAL AS NEEDED
Status: DISCONTINUED | OUTPATIENT
Start: 2024-09-27 | End: 2024-09-27 | Stop reason: HOSPADM

## 2024-09-27 RX ORDER — CHLORHEXIDINE GLUCONATE ORAL RINSE 1.2 MG/ML
15 SOLUTION DENTAL ONCE
Status: COMPLETED | OUTPATIENT
Start: 2024-09-27 | End: 2024-09-27

## 2024-09-27 RX ORDER — FENTANYL CITRATE 50 UG/ML
INJECTION, SOLUTION INTRAMUSCULAR; INTRAVENOUS AS NEEDED
Status: DISCONTINUED | OUTPATIENT
Start: 2024-09-27 | End: 2024-09-27

## 2024-09-27 RX ORDER — OXYCODONE AND ACETAMINOPHEN 5; 325 MG/1; MG/1
1 TABLET ORAL EVERY 4 HOURS PRN
Qty: 20 TABLET | Refills: 0 | Status: SHIPPED | OUTPATIENT
Start: 2024-09-27

## 2024-09-27 RX ORDER — ONDANSETRON 2 MG/ML
INJECTION INTRAMUSCULAR; INTRAVENOUS AS NEEDED
Status: DISCONTINUED | OUTPATIENT
Start: 2024-09-27 | End: 2024-09-27

## 2024-09-27 RX ORDER — OXYCODONE HYDROCHLORIDE 5 MG/1
5 TABLET ORAL EVERY 4 HOURS PRN
Status: CANCELLED | OUTPATIENT
Start: 2024-09-27

## 2024-09-27 RX ORDER — CEFAZOLIN SODIUM 2 G/50ML
2000 SOLUTION INTRAVENOUS ONCE
Status: COMPLETED | OUTPATIENT
Start: 2024-09-27 | End: 2024-09-27

## 2024-09-27 RX ORDER — PROMETHAZINE HYDROCHLORIDE 25 MG/ML
6.25 INJECTION, SOLUTION INTRAMUSCULAR; INTRAVENOUS ONCE
Status: DISCONTINUED | OUTPATIENT
Start: 2024-09-27 | End: 2024-09-27 | Stop reason: HOSPADM

## 2024-09-27 RX ORDER — ONDANSETRON 2 MG/ML
4 INJECTION INTRAMUSCULAR; INTRAVENOUS EVERY 6 HOURS PRN
Status: CANCELLED | OUTPATIENT
Start: 2024-09-27

## 2024-09-27 RX ORDER — SODIUM CHLORIDE, SODIUM LACTATE, POTASSIUM CHLORIDE, CALCIUM CHLORIDE 600; 310; 30; 20 MG/100ML; MG/100ML; MG/100ML; MG/100ML
75 INJECTION, SOLUTION INTRAVENOUS CONTINUOUS
Status: DISCONTINUED | OUTPATIENT
Start: 2024-09-27 | End: 2024-09-27 | Stop reason: HOSPADM

## 2024-09-27 RX ORDER — LIDOCAINE HYDROCHLORIDE 10 MG/ML
INJECTION, SOLUTION EPIDURAL; INFILTRATION; INTRACAUDAL; PERINEURAL AS NEEDED
Status: DISCONTINUED | OUTPATIENT
Start: 2024-09-27 | End: 2024-09-27

## 2024-09-27 RX ORDER — HYDROMORPHONE HCL/PF 1 MG/ML
0.5 SYRINGE (ML) INJECTION
Status: DISCONTINUED | OUTPATIENT
Start: 2024-09-27 | End: 2024-09-27 | Stop reason: HOSPADM

## 2024-09-27 RX ORDER — FENTANYL CITRATE/PF 50 MCG/ML
25 SYRINGE (ML) INJECTION
Status: DISCONTINUED | OUTPATIENT
Start: 2024-09-27 | End: 2024-09-27 | Stop reason: HOSPADM

## 2024-09-27 RX ORDER — ACETAMINOPHEN 325 MG/1
650 TABLET ORAL EVERY 4 HOURS PRN
Status: CANCELLED | OUTPATIENT
Start: 2024-09-27

## 2024-09-27 RX ORDER — ONDANSETRON 2 MG/ML
4 INJECTION INTRAMUSCULAR; INTRAVENOUS ONCE AS NEEDED
Status: DISCONTINUED | OUTPATIENT
Start: 2024-09-27 | End: 2024-09-27 | Stop reason: HOSPADM

## 2024-09-27 RX ORDER — SODIUM CHLORIDE, SODIUM LACTATE, POTASSIUM CHLORIDE, CALCIUM CHLORIDE 600; 310; 30; 20 MG/100ML; MG/100ML; MG/100ML; MG/100ML
INJECTION, SOLUTION INTRAVENOUS CONTINUOUS PRN
Status: DISCONTINUED | OUTPATIENT
Start: 2024-09-27 | End: 2024-09-27

## 2024-09-27 RX ORDER — PROPOFOL 10 MG/ML
INJECTION, EMULSION INTRAVENOUS AS NEEDED
Status: DISCONTINUED | OUTPATIENT
Start: 2024-09-27 | End: 2024-09-27

## 2024-09-27 RX ORDER — DEXAMETHASONE SODIUM PHOSPHATE 10 MG/ML
INJECTION, SOLUTION INTRAMUSCULAR; INTRAVENOUS AS NEEDED
Status: DISCONTINUED | OUTPATIENT
Start: 2024-09-27 | End: 2024-09-27

## 2024-09-27 RX ADMIN — MIDAZOLAM 2 MG: 1 INJECTION INTRAMUSCULAR; INTRAVENOUS at 11:01

## 2024-09-27 RX ADMIN — FENTANYL CITRATE 25 MCG: 50 INJECTION INTRAMUSCULAR; INTRAVENOUS at 12:05

## 2024-09-27 RX ADMIN — FENTANYL CITRATE 25 MCG: 50 INJECTION INTRAMUSCULAR; INTRAVENOUS at 12:02

## 2024-09-27 RX ADMIN — SODIUM CHLORIDE, SODIUM LACTATE, POTASSIUM CHLORIDE, AND CALCIUM CHLORIDE: .6; .31; .03; .02 INJECTION, SOLUTION INTRAVENOUS at 13:39

## 2024-09-27 RX ADMIN — DEXMEDETOMIDINE HYDROCHLORIDE 4 MCG: 100 INJECTION, SOLUTION INTRAVENOUS at 12:02

## 2024-09-27 RX ADMIN — ONDANSETRON 4 MG: 2 INJECTION INTRAMUSCULAR; INTRAVENOUS at 13:37

## 2024-09-27 RX ADMIN — EPHEDRINE SULFATE 10 MG: 50 INJECTION, SOLUTION INTRAVENOUS at 11:52

## 2024-09-27 RX ADMIN — FENTANYL CITRATE 25 MCG: 50 INJECTION INTRAMUSCULAR; INTRAVENOUS at 11:42

## 2024-09-27 RX ADMIN — BUPIVACAINE HYDROCHLORIDE 20 ML: 2.5 INJECTION, SOLUTION EPIDURAL; INFILTRATION; INTRACAUDAL; PERINEURAL at 11:03

## 2024-09-27 RX ADMIN — EPHEDRINE SULFATE 10 MG: 50 INJECTION, SOLUTION INTRAVENOUS at 12:14

## 2024-09-27 RX ADMIN — CEFAZOLIN SODIUM 2000 MG: 2 SOLUTION INTRAVENOUS at 11:46

## 2024-09-27 RX ADMIN — CHLORHEXIDINE GLUCONATE 0.12% ORAL RINSE 15 ML: 1.2 LIQUID ORAL at 09:59

## 2024-09-27 RX ADMIN — DEXMEDETOMIDINE HYDROCHLORIDE 4 MCG: 100 INJECTION, SOLUTION INTRAVENOUS at 12:06

## 2024-09-27 RX ADMIN — DEXMEDETOMIDINE HYDROCHLORIDE 8 MCG: 100 INJECTION, SOLUTION INTRAVENOUS at 11:42

## 2024-09-27 RX ADMIN — SODIUM CHLORIDE, SODIUM LACTATE, POTASSIUM CHLORIDE, AND CALCIUM CHLORIDE: .6; .31; .03; .02 INJECTION, SOLUTION INTRAVENOUS at 11:34

## 2024-09-27 RX ADMIN — LIDOCAINE HYDROCHLORIDE 50 MG: 10 INJECTION, SOLUTION EPIDURAL; INFILTRATION; INTRACAUDAL; PERINEURAL at 11:38

## 2024-09-27 RX ADMIN — KETOROLAC TROMETHAMINE 30 MG: 30 INJECTION, SOLUTION INTRAMUSCULAR at 13:57

## 2024-09-27 RX ADMIN — DEXAMETHASONE SODIUM PHOSPHATE 10 MG: 10 INJECTION, SOLUTION INTRAMUSCULAR; INTRAVENOUS at 11:38

## 2024-09-27 RX ADMIN — EPHEDRINE SULFATE 5 MG: 50 INJECTION, SOLUTION INTRAVENOUS at 12:07

## 2024-09-27 RX ADMIN — FENTANYL CITRATE 25 MCG: 50 INJECTION INTRAMUSCULAR; INTRAVENOUS at 13:38

## 2024-09-27 RX ADMIN — PROPOFOL 50 MG: 10 INJECTION, EMULSION INTRAVENOUS at 11:39

## 2024-09-27 RX ADMIN — PROPOFOL 200 MG: 10 INJECTION, EMULSION INTRAVENOUS at 11:38

## 2024-09-27 NOTE — ANESTHESIA PREPROCEDURE EVALUATION
Procedure:  BUNIONECTOMY LAPIPLASTY (Left: Foot)    Relevant Problems   CARDIO   (+) Pure hypercholesterolemia      ENDO   (+) Acquired hypothyroidism      GI/HEPATIC   (+) Acid reflux   (+) Gastroesophageal reflux disease without esophagitis      NEURO/PSYCH   (+) Anxiety   (+) Current moderate episode of major depressive disorder without prior episode (HCC)      PULMONARY   (+) Shortness of breath        Physical Exam    Airway    Mallampati score: III  TM Distance: >3 FB  Neck ROM: full     Dental   No notable dental hx     Cardiovascular  Cardiovascular exam normal    Pulmonary  Pulmonary exam normal     Other Findings  post-pubertal.      Anesthesia Plan  ASA Score- 2     Anesthesia Type- general with ASA Monitors.         Additional Monitors:     Airway Plan: LMA.           Plan Factors-Exercise tolerance (METS): >4 METS.    Chart reviewed. EKG reviewed.  Existing labs reviewed. Patient summary reviewed.    Patient is a current smoker.  Patient did not smoke on day of surgery.            Induction- intravenous.    Postoperative Plan- Plan for postoperative opioid use.         Informed Consent- Anesthetic plan and risks discussed with patient.  I personally reviewed this patient with the CRNA. Discussed and agreed on the Anesthesia Plan with the CRNA..

## 2024-09-27 NOTE — DISCHARGE SUMMARY
Discharge Summary Outpatient Procedure Podiatry -   Prachi Galo 49 y.o. female MRN: [id number]  Unit/Bed#: OR POOL Encounter: [id number]    Admission Date: 9/27/2024     Admitting Diagnosis: Bunion [M21.619]  Left foot pain [M79.672]    Discharge Diagnosis: same    Procedures Performed: BUNIONECTOMY LAPIPLASTY; AKIN OSTEOTOMY: 06956 (CPT®)    Complications: none    Condition at Discharge: stable    Discharge instructions/Information to patient and family:   See after visit summary for information provided to patient and family.      Provisions for Follow-Up Care/Important appointments:  See after visit summary for information related to follow-up care and any pertinent home health orders.      Discharge Medications:  See after visit summary for reconciled discharge medications provided to patient and family.

## 2024-09-27 NOTE — ANESTHESIA PROCEDURE NOTES
Peripheral Block    Patient location during procedure: holding area  Start time: 9/27/2024 10:40 AM  Reason for block: at surgeon's request and post-op pain management  Staffing  Performed by: Kareem De Jesus MD  Authorized by: Kareem De Jesus MD    Preanesthetic Checklist  Completed: patient identified, IV checked, site marked, risks and benefits discussed, surgical consent, monitors and equipment checked, pre-op evaluation and timeout performed  Peripheral Block  Patient position: supine  Prep: ChloraPrep  Patient monitoring: heart rate, continuous pulse ox and frequent blood pressure checks  Block type: Popliteal  Laterality: left  Injection technique: single-shot  Procedures: ultrasound guided, Ultrasound guidance required for the procedure to increase accuracy and safety of medication placement and decrease risk of complications.  Ultrasound permanent image saved  bupivacaine (PF) (MARCAINE) 0.25 % injection 20 mL - Perineural   20 mL - 9/27/2024 11:03:00 AM  Needle  Needle type: Stimuplex   Needle gauge: 22 G  Needle length: 4 in  Needle localization: ultrasound guidance  Assessment  Injection assessment: incremental injection, local visualized surrounding nerve on ultrasound, negative aspiration for heme, no paresthesia on injection, frequent aspiration, needle tip visualized at all times, negative aspiration, no symptoms of intraneural/intravenous injection, negative for heart rate change and injected with ease  Paresthesia pain: none  Post-procedure:  site cleaned  patient tolerated the procedure well with no immediate complications

## 2024-09-27 NOTE — OP NOTE
OPERATIVE REPORT - Podiatry  PATIENT NAME: Prachi Galo    :  1975  MRN: [id number]  Pt Location: [facility] OR ROOM 03    SURGERY DATE: 2024    Surgeons and Role:     * Luis A Mendez, [place] - Primary     * Waldemar White DPM - Assisting     * Haider Araujo DPM - Assisting    Pre-op Diagnosis:  Bunion [M21.619]  Left foot pain [M79.672]    Post-Op Diagnosis Codes:     * Bunion [M21.619]     * Left foot pain [M79.672]    Procedure(s) (LRB):  BUNIONECTOMY LAPIPLASTY; AKIN OSTEOTOMY (Left)    Specimen(s):  * No specimens in log *    Estimated Blood Loss:   Minimal    Drains:  * No LDAs found *    Anesthesia Type:   General w/ Popliteal Block with 19 ml of 1% Lidocaine and 0.25% Bupivacaine in a 1:1 mixture    Hemostasis:  Pneumatic tourniquet applied to the left lower extremity 120 minutes at approximately 250 mmHg    Materials:  Implant Name Type Inv. Item Serial No.  Lot No. LRB No. Used Action   SPEEDPLATE 28 X 13 X 11MM ANTOMIC QUAD LAPIPLASTY - [id number]  SPEEDPLATE 28 X 13 X 11MM ANTOMIC QUAD LAPIPLASTY  [employer] [id number] Left 1 Implanted   SPEEDPLATE 28 X 13 X 11MM ANTOMIC QUAD LAPIPLASTY - [id number]  SPEEDPLATE 28 X 13 X 11MM ANTOMIC QUAD LAPIPLASTY  [employer] [id number] Left 1 Implanted   STAPLE NITINOL SYMMETRICAL 10 X 10MM - [id number]  STAPLE NITINOL SYMMETRICAL 10 X 10MM  [id number] Left 1 Implanted     3-0 Vicryl  4-0 Vicryl  4-0 nylon    Operative Findings:  Consistent with diagnosis    Complications:   Medial aspect of the left second metatarsal was partially cut with the sagittal saw.  The second metatarsal is stable and patient will be nonweightbearing postop followed by partial weightbearing in a cam boot    Procedure and Technique:     Under mild sedation, the patient was brought into the operating room and placed on the operating room table in the supine position. IV sedation was achieved by anesthesia team and a universal  timeout was performed where all parties are in agreement of correct patient, correct procedure and correct site. A pneumatic tourniquet was then placed over the patient's left lower extremity with ample padding. A dotson block was performed consisting of 19 ml of 1% Lidocaine and 0.25% Bupivacaine in a 1:1 mixture. The foot was then prepped and draped in the usual aseptic manner. An esmarch bandage was used to exsangunate the foot and the pneumatic tourniquet was then inflated to 250mmHg.     Attention was directed to the left foot where a linear incision was made over the first metatarsophalangeal joint extending to the 1st met cuneiform joint. Dissection was continued through the subq layer, bleeders were cauterized as needed.      The first TMJ was identified, a deep incision was carried down to bone. The periosteum was longitudinally incised and reflected away medially and laterally from the underlying first tarsometatarsal joint. A lateral release was performed on the lateral 1st MPJ.  A 1st tarsometatarsal fusion was performed under 's protocol with Signosticsiplasty 3D bunion correction system.  A joint release was performed running sagittal saw closely down 1st TMT to mobilize and plane the joint surfaces.  The positioner was then applied to allow for IM angle correction as well as frontal plane rotation and sagittal alignment in correct position.  The Lapiplasty cut guide was then secured and utilized to make precise joint cuts with triplanar correction held in place.  The distractor was then applied to distract the joint for removal of bone slices and fenestration of joint surfaces.  Utilizing compressor, the cut joint surfaces were brought together for controlled apposition and compression of the arthrodesis site.  Biplanar speed splates were then applied dorsally and medially to provide multiplane fixation for rapid weight-bearing.       The first MTPJ was incised and via sharp means the  metatarsal head was then exposed.   By use of sagittal saw the medial eminence was resected.  The dorsal exostosis over the met head was also resected with sagittal saw.      Correction of the deformity was assessed at this time and a Corbin osteotomy was deemed necessary.  Via the original skin incision the mid base of the proximal phalanx was further dissected out and periosteal structures were reflected medial laterally. The 1st medial to lateral cut was performed parallel to the base of the proximal phalanx through and through from dorsal to plantar with leaving the lateral cortex intact.  A 2nd cut was made at bed approximately 15 degree angle distally from the original cut,  again in a through to through dorsal to plantar fashion leaving the lateral cortex intact. The osteotomy was closed with manual pressure and secured with one staple . At this time correction of deformity was noted to be excellent.    The site was flushed with sterile saline. It was noted that the intermetatarsal angle was reduced within normal limits and the hallux sat in a normal anatomical position with good range of motion of the first MPJ.  Final position was confirmed with C-arm fluoroscopy. The periosteal and capsular structures were reapproximated using vicryl. Subcutaneous closure was obtained utilizing vicryl. Skin edges were reapproximated and closure was obtained utilizing nylon. The foot was then cleansed and dried.The incision site was dressed with xeroform, gauze, ABD pad, Xiomara, and posterior splint was applied consisting of Webril, 4 x 30 Ortho-Glass, and Ace wrap    The tourniquet was deflated at approximately 120 min and normal hyperemic response was noted to all digits. The patient tolerated the procedure and anesthesia well without immediate complications and transferred to PACU with vital signs stable.     Dr. Mendez was present during the entire procedure and participated in all key aspects.    SIGNATURE: Waldemar White,  "[facility]  DATE: September 27, 2024  TIME: 2:14 PM      Portions of the record may have been created with voice recognition software. Occasional wrong word or \"sound a like\" substitutions may have occurred due to the inherent limitations of voice recognition software. Read the chart carefully and recognize, using context, where substitutions have occurred.              "

## 2024-09-27 NOTE — DISCHARGE INSTR - AVS FIRST PAGE
Dr. Luis A Mendez, DPM  Post-op surgery Instructions    Pain / Swelling  There is expected to be some discomfort, swelling and bruising of the foot. You might see some blood on the bandage. This is not a cause for alarm. However, if there is active or persistent bleeding (blood running out of the bandage while at rest) - call the office at once (or) go to a [facility] ER and ask them to page the podiatry residents.  Apply an ice bag to the top of your ankle for 30 minutes for each waking hour, for the first 72 hours. This should be discontinued when sleeping. This will also work through your cast if you have one. Ice must not leak and wet the dressings. Also, using the ice inappropriately can cause permanent nerve damage.  Your foot should be elevated as much as possible for the first 72 hours. The foot should be above heart level. If your foot is below heart level, throbbing and pain will increase.  When sleeping, elevation can be accomplished by putting a small hard suitcase between the box spring and mattress at the foot of the bed.  Walking and standing will increase pain, throbbing and bleeding.  Persistent pain despite elevation and your pain meds can many times be relieved by removing the tight brown compression layer (called the ACE wrap) that is over the white gauze dressing. If you are elevating and taking your pain meds and pain is still severe, remove this brown stretchy layer but leave the gauze intact. Wait 30 minutes. If the pain subsides, reapply the ACE so it’s not so tight. If pain doesn’t get better, call your doctor.   Dressings / Casts  Do not remove your surgical dressings - they will be changed at your doctor appointment. Do not allow surgical dressings to get wet. Sponge baths should be used until the sutures are removed.  Do not try to keep the foot dry using a garbage bag and tape - this rarely works.  If you get your dressings or cast wet - call your doctor immediately.  If your cast  or dressings feel tight - elevate your foot for 30 minutes. If this doesn’t help and you feel tingling or see toe discoloration - call your doctor or go to a [facility] ER and ask them to page the podiatry residents.   Do not put things in your cast such as powder, coat hangers to scratch, etc.. This can cause skin damage and infections.   Infection  If you have a fever at or above 100 degrees, chills, sweats, or see red streaks rising above the dressing or smell odor / see pus (creamy white drainage), call your doctor immediately or go to a [facility] ER and ask them to page the podiatry residents.  Constipation  If you have severe constipation after surgery, this can be due to the pain medication. Notify your doctor and special medication will be prescribed to deal with this.   Blood Clots  If you had surgery and are in a cast, have an external fixation device, or are non-weightbearing using crutches, a knee scooter, a wheelchair, a walker, or an iWalk device - you need to be on a blood thinner. Your doctor will prescribe one of the regimens below. If you run out of the blood thinner checked off below before you are walking normally on your foot and out of your cast - notify your doctor immediately so you can get a refill. Not doing so can lead to blood clots and serious complications including death.    Numbness  It is normal for your foot to be numb until about dinner time. If you’ve had a popliteal block procedure, you might be numb until the following day. When you start to feel pins and needles in the foot - this means the block is wearing off. That is the appropriate time to take your pain medication.   Pain Medication  Do not supplement your pain medication with over the counter drugs, old leftover pain medications, or extra Tylenol. You must discuss any additional medications with your doctor prior to taking them for pain.   Driving  No driving is allowed without discussion with the  doctor  Ambulation  Nonweightbearing to surgical foot  If given a flat, stiff shoe / darco wedge shoe, Do not walk at all without it.  Use a device (cane, walker, crutches) to take some weight off of the foot when walking  If instructed not to put weight on the surgical foot, use the following:  Crutches     Putting weight on the foot will lead to complications.

## 2024-09-27 NOTE — ANESTHESIA POSTPROCEDURE EVALUATION
Post-Op Assessment Note    CV Status:  Stable  Pain Score: 0    Pain management: adequate    Multimodal analgesia used between 6 hours prior to anesthesia start to PACU discharge    Mental Status:  Alert and awake   Hydration Status:  Stable and euvolemic   PONV Controlled:  None   Airway Patency:  Patent     Post Op Vitals Reviewed: Yes    No anethesia notable event occurred.    Staff: CRNA               BP   109/56   Temp   98.3   Pulse  62   Resp  14    SpO2  100

## 2024-09-30 ENCOUNTER — OFFICE VISIT (OUTPATIENT)
Dept: PODIATRY | Facility: CLINIC | Age: 49
End: 2024-09-30

## 2024-09-30 VITALS
HEIGHT: 64 IN | DIASTOLIC BLOOD PRESSURE: 83 MMHG | HEART RATE: 88 BPM | SYSTOLIC BLOOD PRESSURE: 123 MMHG | OXYGEN SATURATION: 98 % | BODY MASS INDEX: 33.27 KG/M2

## 2024-09-30 DIAGNOSIS — M21.619 BUNION: ICD-10-CM

## 2024-09-30 DIAGNOSIS — Z98.890 POSTOPERATIVE STATE: Primary | ICD-10-CM

## 2024-09-30 PROCEDURE — 99024 POSTOP FOLLOW-UP VISIT: CPT | Performed by: PODIATRIST

## 2024-10-07 ENCOUNTER — OFFICE VISIT (OUTPATIENT)
Dept: PODIATRY | Facility: CLINIC | Age: 49
End: 2024-10-07

## 2024-10-07 ENCOUNTER — TELEPHONE (OUTPATIENT)
Dept: ENDOCRINOLOGY | Facility: CLINIC | Age: 49
End: 2024-10-07

## 2024-10-07 VITALS
BODY MASS INDEX: 33.27 KG/M2 | HEART RATE: 76 BPM | OXYGEN SATURATION: 97 % | SYSTOLIC BLOOD PRESSURE: 116 MMHG | DIASTOLIC BLOOD PRESSURE: 78 MMHG | HEIGHT: 64 IN

## 2024-10-07 DIAGNOSIS — M21.619 BUNION: ICD-10-CM

## 2024-10-07 DIAGNOSIS — Z98.890 POSTOPERATIVE STATE: Primary | ICD-10-CM

## 2024-10-07 PROCEDURE — 99024 POSTOP FOLLOW-UP VISIT: CPT | Performed by: PODIATRIST

## 2024-10-07 NOTE — TELEPHONE ENCOUNTER
Appointment change due to providers schedule. Moved from 10- to 10- [facility] time. If appointment is not correct please reschedule accordingly.

## 2024-10-07 NOTE — PROGRESS NOTES
Assessment/Plan:     The patient's clinical examination today significant for well coapted incision lines to the left foot with all sutures intact. There are no signs of infection noted. Ecchymosis has essentially resolved.  Neurovascular status is intact.    The postsurgical wounds were redressed with a dry sterile dressing with Xeroform as a contact layer.  She can begin guarded weightbearing in a cam boot with crutches.    Recommend follow-up in 1 week for possible suture removal.  Will repeat x-rays of the left foot at that time.     Diagnoses and all orders for this visit:    Postoperative state    Bunion          Subjective:     Patient ID: Prachi Galo is a 49 y.o. female.    The patient presents today for follow-up status post Lapiplasty of the left foot.  She has been performing nonweightbearing gait with crutches with the foot in cam boot since her last visit.  Her pain is under good control.  She takes ibuprofen on an as-needed basis.      PAST MEDICAL HISTORY:  Past Medical History:   Diagnosis Date    Anxiety 4/16/2020    COVID-19 11/13/2021    tested positive    Disease of thyroid gland 2017    Headache(784.0) Various    History of ovarian cyst     Hypothyroidism     Obesity (BMI 30-39.9)        PAST SURGICAL HISTORY:  Past Surgical History:   Procedure Laterality Date    PELVIC LAPAROSCOPY Right 05/03/2021    Procedure: RIGHT LAPAROSCOPIC OVARIAN CYSTECTOMY;  Surgeon: Shira Benitez DO;  Location: [facility];  Service: Gynecology    LA CORRJ HLX VLGS BNCTY SESMDC JOINT ARTHRODESIS Left 9/27/2024    Procedure: BUNIONECTOMY LAPIPLASTY; NANCY OSTEOTOMY;  Surgeon: Luis A Mendez DPM;  Location: [facility];  Service: Podiatry    LA LAPAROSCOPY W/RMVL ADNEXAL STRUCTURES Bilateral 05/03/2021    Procedure: SALPINGECTOMY, LAPAROSCOPIC;  Surgeon: Shira Benitez DO;  Location: [facility];  Service: Gynecology    LA LAPS ABD PRTM&OMENTUM DX W/WO SPEC BR/WA SPX N/A 05/03/2021    Procedure:  LAPAROSCOPY DIAGNOSTIC;  Surgeon: Shira Benitez DO;  Location[facility];  Service: Gynecology    WI REMOVAL INTRAUTERINE DEVICE IUD N/A 2021    Procedure: REMOVAL OF INTRAUTERINE DEVICE (IUD);  Surgeon: Shira Benitez DO;  Location: [facility];  Service: Gynecology    TUBAL LIGATION          ALLERGIES:  Patient has no known allergies.    MEDICATIONS:  Current Outpatient Medications   Medication Sig Dispense Refill    levothyroxine 150 mcg tablet Take 1 tablet (150 mcg total) by mouth daily 90 tablet 2    VITAMIN D PO Take by mouth daily      oxyCODONE-acetaminophen (Percocet) 5-325 mg per tablet Take 1 tablet by mouth every 4 (four) hours as needed for moderate pain for up to 20 doses Max Daily Amount: 6 tablets (Patient not taking: Reported on 10/7/2024) 20 tablet 0     No current facility-administered medications for this visit.       SOCIAL HISTORY:  Social History     Socioeconomic History    Marital status: /Civil Union     Spouse name: None    Number of children: 4    Years of education: None    Highest education level: None   Occupational History    Occupation:    Tobacco Use    Smoking status: Former     Current packs/day: 0.00     Average packs/day: 0.3 packs/day for 5.0 years (1.3 ttl pk-yrs)     Types: Cigarettes     Start date: 2009     Quit date: 2014     Years since quittin.7    Smokeless tobacco: Former    Tobacco comments:     quit 3 years ago in      Quit as of 8 month ago (2024)   Vaping Use    Vaping status: Every Day    Substances: Nicotine, Flavoring   Substance and Sexual Activity    Alcohol use: Yes     Comment: Socially    Drug use: No    Sexual activity: Yes     Partners: Male     Birth control/protection: Female Sterilization     Comment: tubial ligation   Other Topics Concern    None   Social History Narrative    Drinks 4 cups of coffee daily      Social Determinants of Health     Financial Resource Strain: Not on file    Food Insecurity: Not on file   Transportation Needs: Not on file   Physical Activity: Not on file   Stress: Not on file   Social Connections: Not on file   Intimate Partner Violence: Not on file   Housing Stability: Not on file        Review of Systems   Constitutional: Negative.    HENT: Negative.     Eyes: Negative.    Respiratory: Negative.     Cardiovascular: Negative.    Endocrine: Negative.    Musculoskeletal: Negative.    Neurological: Negative.    Hematological: Negative.    Psychiatric/Behavioral: Negative.           Objective:     Physical Exam  Constitutional:       Appearance: Normal appearance.   HENT:      Head: Normocephalic and atraumatic.      Nose: Nose normal.   Pulmonary:      Effort: Pulmonary effort is normal.   Skin:     General: Skin is warm.      Capillary Refill: Capillary refill takes less than 2 seconds.   Neurological:      General: No focal deficit present.      Mental Status: She is alert and oriented to person, place, and time.   Psychiatric:         Mood and Affect: Mood normal.         Behavior: Behavior normal.         Thought Content: Thought content normal.

## 2024-10-14 ENCOUNTER — OFFICE VISIT (OUTPATIENT)
Dept: PODIATRY | Facility: CLINIC | Age: 49
End: 2024-10-14

## 2024-10-14 ENCOUNTER — HOSPITAL ENCOUNTER (OUTPATIENT)
Dept: RADIOLOGY | Facility: HOSPITAL | Age: 49
Discharge: HOME | End: 2024-10-14
Attending: PODIATRIST
Payer: COMMERCIAL

## 2024-10-14 VITALS
HEIGHT: 64 IN | DIASTOLIC BLOOD PRESSURE: 83 MMHG | HEART RATE: 61 BPM | BODY MASS INDEX: 33.27 KG/M2 | SYSTOLIC BLOOD PRESSURE: 122 MMHG | OXYGEN SATURATION: 99 %

## 2024-10-14 DIAGNOSIS — M21.619 BUNION: ICD-10-CM

## 2024-10-14 DIAGNOSIS — Z98.890 POSTOPERATIVE STATE: ICD-10-CM

## 2024-10-14 DIAGNOSIS — Z98.890 POSTOPERATIVE STATE: Primary | ICD-10-CM

## 2024-10-14 PROCEDURE — 73630 X-RAY EXAM OF FOOT: CPT

## 2024-10-14 PROCEDURE — 99024 POSTOP FOLLOW-UP VISIT: CPT | Performed by: PODIATRIST

## 2024-10-14 NOTE — PROGRESS NOTES
Assessment/Plan:     The patient's clinical examination today significant for well coapted incision lines to the dorsum of the left foot.  Sutures were removed today without complication.  Pulses are palpable to the left foot.    Repeat x-rays of the patient's left foot taken today were personally reviewed and interpreted.  Findings were significant for progressive healing of the arthrodesis site at the first met cuneiform joint with hardware intact.    The patient will continue guarded weightbearing in cam boot for at least another 2 weeks.  Recommend follow-up at that time.     Diagnoses and all orders for this visit:    Postoperative state  -     XR foot 3+ vw left; Future    Bunion  -     XR foot 3+ vw left; Future          Subjective:     Patient ID: Prachi Galo is a 49 y.o. female.    The patient presents today for follow-up status post left foot bunion correction via Lapiplasty.  She notes improvement in postoperative pain.  She was able to ambulate on the foot in the cam boot with a single crutch for support for about 4 hours.  Still notes some numbness to the top of her left toe.        Review of Systems   Constitutional: Negative.    HENT: Negative.     Eyes: Negative.    Respiratory: Negative.     Cardiovascular: Negative.    Endocrine: Negative.    Musculoskeletal: Negative.    Neurological: Negative.    Hematological: Negative.    Psychiatric/Behavioral: Negative.           Objective:     Physical Exam  Constitutional:       Appearance: Normal appearance.   HENT:      Head: Normocephalic and atraumatic.      Nose: Nose normal.   Cardiovascular:      Pulses:           Dorsalis pedis pulses are 2+ on the left side.        Posterior tibial pulses are 2+ on the left side.   Pulmonary:      Effort: Pulmonary effort is normal.   Feet:      Comments: The patient's clinical examination today significant for well coapted incision lines to the dorsum of the left foot.  Sutures were removed today without  complication.  Pulses are palpable to the left foot.  Skin:     General: Skin is warm.      Capillary Refill: Capillary refill takes less than 2 seconds.   Neurological:      General: No focal deficit present.      Mental Status: She is alert and oriented to person, place, and time.   Psychiatric:         Mood and Affect: Mood normal.         Behavior: Behavior normal.         Thought Content: Thought content normal.

## 2024-10-15 NOTE — PROGRESS NOTES
Assessment/Plan:      There are no diagnoses linked to this encounter.    Varicose veins of right lower extremity with edema  49-year-old female presents for follow-up for varicose veins.  She did see our vascular EP a few months ago for her initial visit.  She does have small varicosities in her right calf this is located along the anterior and lateral aspect of her right calf.  She was given compression stockings and is worn them for 3 months without significant improvement she reports pain and heaviness in the area localized to her varicosities in the right leg.  She has had no prior venous surgery.  She denies any symptoms on her left leg.  She did have a recent bunionectomy on the left side and she is in a walking boot.  Her lower extremity venous reflux duplex demonstrates GSV insufficiency on the left with no DVT I discussed options with her including continued send conservative management and venous surgery which would consist of great saphenous vein EVLT and limited phlebectomies after discussing all risks benefits and alternative therapies she consented to proceed she would require few phlebectomies along the anterior and lateral right calf as well as the GSV EVLT written consent was obtained she wishes to have this scheduled after New Year's.      Subjective:     Patient ID: Prachi Galo is a 49 y.o. female.    Patient presents today to review LEVDR done 7/12/24. RLE spider veins with associated cramping and swelling. Wearing compression on occasion.     HPI    Review of Systems   Constitutional: Negative.    HENT: Negative.     Eyes: Negative.    Respiratory: Negative.     Cardiovascular:  Positive for leg swelling.   Gastrointestinal: Negative.    Endocrine: Negative.    Genitourinary: Negative.    Musculoskeletal: Negative.    Skin: Negative.    Allergic/Immunologic: Negative.    Neurological: Negative.    Hematological: Negative.    Psychiatric/Behavioral: Negative.         I have reviewed the ROS  above and made changes as needed.      Objective:     Physical Exam      General  Exam: alert, awake, oriented, no distress, consistent with stated age    Integumentary  Exam: warm, dry, no gross lesions, no bruises and normal color    Head and Neck  Exam: supple, no bruits, trachea midline, no JVD, no mass or palpable nodes    Chest and Lung  Exam: chest normal without deformity, bilaterally expansive, clear to auscultation    Cardiovascular  Exam: regular rate, regular rhythm, no murmurs, no rubs or gallops    Adbomen  Exam: soft, non-tender, non-distended, no pulsatile abdominal masses, no abdominal bruit    Peripheral Vascular  Exam: no clubbing of the digits of the upper extremity, no cyanosis, no edema, both feet are warm, radial pulses 2+ bilaterally, skin well perfused    Small cluster VV anterior right calf  Later right calf VV and small cluster spider veins right lateral distal calf    Upper Extremity:  Palpation: Radial pulse- Bilateral 2+    Neurologic  Exam:alert, non-focal, oriented x 3, cranial nerves II-XII grossly intact            EVLT Operative Scheduling Information:    Hospital:  [facility]    Physician:  Bruno    Surgery: right GSV EVLT and right leg phlebectomies    Urgency:  Standard    Level:  Level 4: Outpatients to be scheduled for screening procedures and elective surgery that can be delayed for longer than one month without reasonable expectation of detriment to patient.    Case Length:  Normal    Post-op Bed:  Outpatient    OR Table:  Standard    Equipment Needs:  None    Medication Instructions:  None    Hydration:  No    Contrast Allergy:  No    Venous Clinical Severity Scores (VCSS)  Item Absent   (0 points) Mild   (1 point) Moderate   (2 points) Severe   (3 points)   Pain [] None [] Occasional [] Daily [x] Daily limiting   Varicose veins [] None [] Few [x] Calf or thigh [] Calf and thigh   Venous edema [x] None [] Foot and ankle [] Above ankle, below knee [] To knee of above    Skin pigmentation [] None [x] Perimalleolar [] Diffuse, lower 1/3 calf [] Wider, above lower 1/3 calf   Inflammation [] None [x] Perimalleolar [] Diffuse, lower 1/3 calf [] Wider, above lower 1/3 calf   Induration [] None [x] Perimalleolar [] Diffuse, lower 1/3 calf [] Wider, above lower 1/3 calf   No. active ulcers [x] None [] 1 [] 2 [] >=3   Active ulcer size [x] None [] <2 cm [] 2 - 6 cm [] >6 cm   Ulcer duration [x] None [] <3 months [] 3 - 12 months [] >1 year   Compression therapy [] None [] Intermittent [] Most days [x] Fully comply   Total 11          CEAP Clinical Classification  [x] Symptomatic   [] Asymptomatic     [] Class 0 No visible or palpable signs of venous disease   [] Class 1 Telangiectasies or reticular veins   [x] Class 2 Varicose veins; distinguished from reticular veins by a diameter of 3mm or more   [] Class 3 Edema   [] Class 4 Changes in skin and subcutaneous tissue secondary to CVD    [] Class 4a Pigmentation or eczema   [] Class 4b Lipodermatosclerosis or atrophie kecia   [] Class 5 Healed venous ulcer   [] Class 6 Active venous ulcer

## 2024-10-16 ENCOUNTER — TELEPHONE (OUTPATIENT)
Dept: VASCULAR SURGERY | Facility: CLINIC | Age: 49
End: 2024-10-16

## 2024-10-16 ENCOUNTER — OFFICE VISIT (OUTPATIENT)
Dept: VASCULAR SURGERY | Facility: CLINIC | Age: 49
End: 2024-10-16
Payer: COMMERCIAL

## 2024-10-16 VITALS
BODY MASS INDEX: 33.27 KG/M2 | SYSTOLIC BLOOD PRESSURE: 124 MMHG | HEART RATE: 68 BPM | HEIGHT: 64 IN | DIASTOLIC BLOOD PRESSURE: 84 MMHG

## 2024-10-16 DIAGNOSIS — I83.91 VARICOSE VEINS OF RIGHT LOWER LEG: Primary | ICD-10-CM

## 2024-10-16 DIAGNOSIS — I83.891 VARICOSE VEINS OF RIGHT LOWER EXTREMITY WITH EDEMA: ICD-10-CM

## 2024-10-16 PROBLEM — I83.811 VARICOSE VEINS OF RIGHT LOWER EXTREMITY WITH PAIN: Status: RESOLVED | Noted: 2017-06-07 | Resolved: 2024-10-16

## 2024-10-16 PROCEDURE — 99215 OFFICE O/P EST HI 40 MIN: CPT | Performed by: SURGERY

## 2024-10-16 RX ORDER — CEFAZOLIN SODIUM 2 G/50ML
2000 SOLUTION INTRAVENOUS ONCE
OUTPATIENT
Start: 2024-10-16 | End: 2024-10-16

## 2024-10-16 RX ORDER — CHLORHEXIDINE GLUCONATE ORAL RINSE 1.2 MG/ML
15 SOLUTION DENTAL ONCE
OUTPATIENT
Start: 2024-10-16 | End: 2024-10-16

## 2024-10-16 NOTE — ASSESSMENT & PLAN NOTE
49-year-old female presents for follow-up for varicose veins.  She did see our vascular EP a few months ago for her initial visit.  She does have small varicosities in her right calf this is located along the anterior and lateral aspect of her right calf.  She was given compression stockings and is worn them for 3 months without significant improvement she reports pain and heaviness in the area localized to her varicosities in the right leg.  She has had no prior venous surgery.  She denies any symptoms on her left leg.  She did have a recent bunionectomy on the left side and she is in a walking boot.  Her lower extremity venous reflux duplex demonstrates GSV insufficiency on the left with no DVT I discussed options with her including continued send conservative management and venous surgery which would consist of great saphenous vein EVLT and limited phlebectomies after discussing all risks benefits and alternative therapies she consented to proceed she would require few phlebectomies along the anterior and lateral right calf as well as the GSV EVLT written consent was obtained she wishes to have this scheduled after New Year's.

## 2024-10-16 NOTE — TELEPHONE ENCOUNTER
REMINDER: Under Reason For Call, comments MUST be formatted as:   (Surgeon's Initials) / (Procedure)      Special Instructions / FYI: [facility]                                               Patient would like to wait until January 2025.      Consent: I certify that patient has signed, printed, timed, and dated their surgery consent.  I certify that the patient's LEGAL NAME and DATE OF BIRTH are written in the upper left corner on BOTH sides of the consent.  I certify that BOTH sides of the completed surgery consent have been scanned into the patient's Epic chart by myself on 10/16/2024.  Yes, I have LABELED the consent in Epic as Consent for Vascular Procedure.     For Surgical Clearances     Levels   1-3   ROUTE this encounter to [facility] Clearance Pool (AND)   [facility] Surgery Coordinator Pool     Level   4   ROUTE this encounter to [facility] Surgery Coordinator Pool       HYDRATION CLEARANCES   ONLY ROUTE TO  [facility] Clearance Pool       Yes, I have ROUTED this encounter to [facility] Surgery Coordinator and/or [facility] Clearance Pool.

## 2024-10-16 NOTE — TELEPHONE ENCOUNTER
ARACELI 10/16/24 for patient that we will contact her once our schedule opens up for January to schedule her procedure

## 2024-10-21 ENCOUNTER — OFFICE VISIT (OUTPATIENT)
Dept: PODIATRY | Facility: CLINIC | Age: 49
End: 2024-10-21

## 2024-10-21 VITALS
OXYGEN SATURATION: 98 % | DIASTOLIC BLOOD PRESSURE: 90 MMHG | SYSTOLIC BLOOD PRESSURE: 132 MMHG | HEIGHT: 64 IN | BODY MASS INDEX: 33.27 KG/M2 | HEART RATE: 62 BPM

## 2024-10-21 DIAGNOSIS — Z98.890 POSTOPERATIVE STATE: Primary | ICD-10-CM

## 2024-10-21 DIAGNOSIS — M21.619 BUNION: ICD-10-CM

## 2024-10-21 PROCEDURE — 99024 POSTOP FOLLOW-UP VISIT: CPT | Performed by: PODIATRIST

## 2024-10-21 NOTE — PROGRESS NOTES
Assessment/Plan:     The patient's clinical examination today significant for well healed incision lines to the dorsum of the left foot.  There does not appear to be a small area of a very superficial, partial-thickness dehiscence.  There are no signs of infection.  Passive range of motion of the left great toe joint is satisfactory.  Pulses are palpable to the left foot.     The patient will continue guarded weightbearing in cam boot for at least another week.      Recommend follow-up in 1 week.        Diagnoses and all orders for this visit:    Postoperative state    Bunion          Subjective:     Patient ID: Prachi Galo is a 49 y.o. female.    The patient presents today for follow-up status post left foot bunion correction via Lapiplasty.  She notes minimal postoperative pain today.  Did state that she still has difficulty fitting the foot into a shoe.  She also notes difficulty flexing the left great toe joint.  With the cam boot on, she can ambulate without any pain or discomfort.        Review of Systems   Constitutional: Negative.    HENT: Negative.     Eyes: Negative.    Respiratory: Negative.     Cardiovascular: Negative.    Endocrine: Negative.    Musculoskeletal: Negative.    Neurological: Negative.    Hematological: Negative.    Psychiatric/Behavioral: Negative.           Objective:     Physical Exam  Constitutional:       Appearance: Normal appearance.   HENT:      Head: Normocephalic and atraumatic.      Nose: Nose normal.   Cardiovascular:      Pulses:           Dorsalis pedis pulses are 2+ on the left side.        Posterior tibial pulses are 2+ on the left side.   Pulmonary:      Effort: Pulmonary effort is normal.   Feet:      Comments: The patient's clinical examination today significant for well healed incision lines to the dorsum of the left foot.  There does not appear to be a small area of a very superficial, partial-thickness dehiscence.  There are no signs of infection.  Passive range of  motion of the left great toe joint is satisfactory.  Pulses are palpable to the left foot.  Skin:     General: Skin is warm.      Capillary Refill: Capillary refill takes less than 2 seconds.   Neurological:      General: No focal deficit present.      Mental Status: She is alert and oriented to person, place, and time.   Psychiatric:         Mood and Affect: Mood normal.         Behavior: Behavior normal.         Thought Content: Thought content normal.

## 2024-10-28 ENCOUNTER — OFFICE VISIT (OUTPATIENT)
Dept: PODIATRY | Facility: CLINIC | Age: 49
End: 2024-10-28

## 2024-10-28 VITALS
HEART RATE: 91 BPM | HEIGHT: 64 IN | SYSTOLIC BLOOD PRESSURE: 130 MMHG | OXYGEN SATURATION: 98 % | DIASTOLIC BLOOD PRESSURE: 90 MMHG | BODY MASS INDEX: 33.27 KG/M2

## 2024-10-28 DIAGNOSIS — Z98.890 POSTOPERATIVE STATE: Primary | ICD-10-CM

## 2024-10-28 DIAGNOSIS — M21.619 BUNION: ICD-10-CM

## 2024-10-28 PROCEDURE — 99024 POSTOP FOLLOW-UP VISIT: CPT | Performed by: PODIATRIST

## 2024-10-28 NOTE — PROGRESS NOTES
Assessment/Plan:     The patient's clinical examination today significant for well healed incision lines to the dorsum of the left foot.  The superficial, partial-thickness dehiscence site has healed.  There are no signs of infection.  Passive range of motion of the left great toe joint is satisfactory, however guarding is noted secondary to pain.  Pulses are palpable to the left foot.     The patient is cleared to return to a supportive shoe or sneaker with a roomy toebox.  Recommend follow-up in 3 weeks for repeat x-rays of the left foot.    The patient is still having mild to moderate postoperative pain to the postoperative sites.  Anticipated return to work date is Monday, November 5, 2024.     Recommend follow-up in 3 weeks.        Diagnoses and all orders for this visit:    Postoperative state    Bunion          Subjective:     Patient ID: Prachi Galo is a 49 y.o. female.    The patient presents today for follow-up status post left foot bunion correction via Lapiplasty.  She still notes some swelling to her left foot.  She also still notes difficulty in trying to fit her foot into a shoe.        Review of Systems   Constitutional: Negative.    HENT: Negative.     Eyes: Negative.    Respiratory: Negative.     Cardiovascular: Negative.    Endocrine: Negative.    Musculoskeletal: Negative.    Neurological: Negative.    Hematological: Negative.    Psychiatric/Behavioral: Negative.           Objective:     Physical Exam  Constitutional:       Appearance: Normal appearance.   HENT:      Head: Normocephalic and atraumatic.      Nose: Nose normal.   Cardiovascular:      Pulses:           Dorsalis pedis pulses are 2+ on the left side.        Posterior tibial pulses are 2+ on the left side.   Pulmonary:      Effort: Pulmonary effort is normal.   Feet:      Comments: The patient's clinical examination today significant for well healed incision lines to the dorsum of the left foot.  The superficial, partial-thickness  dehiscence site has healed.  There are no signs of infection.  Passive range of motion of the left great toe joint is satisfactory, however guarding is noted secondary to pain.  Pulses are palpable to the left foot.  Skin:     General: Skin is warm.      Capillary Refill: Capillary refill takes less than 2 seconds.   Neurological:      General: No focal deficit present.      Mental Status: She is alert and oriented to person, place, and time.   Psychiatric:         Mood and Affect: Mood normal.         Behavior: Behavior normal.         Thought Content: Thought content normal.

## 2024-10-29 ENCOUNTER — TELEPHONE (OUTPATIENT)
Age: 49
End: 2024-10-29

## 2024-10-29 NOTE — TELEPHONE ENCOUNTER
Caller: Renu Galo    Doctor: Dr. Mendez/Simone    Reason for call: asking about ppwk/just rec'd 10/28 so 10-14 business days for completion/she will be stopping by office 10/30 w/ fitness for duty form for return to work that needs to be completed by Dr. Mendez. Please advise when this will be done for her as Dr. Mendez's note states she will go back to work 11/5/24, even though he told her she was not ready at visit. Just for office's/Ronnie info     Call back#: [phone]/no need for call back

## 2024-10-30 ENCOUNTER — TELEPHONE (OUTPATIENT)
Age: 49
End: 2024-10-30

## 2024-10-30 NOTE — TELEPHONE ENCOUNTER
Everything is complete and Kaylynn will be  these form. QUETA Mendez is Not supposed to fill out this paperwork. We have a team that does this.

## 2024-10-30 NOTE — TELEPHONE ENCOUNTER
Renu dropped off  a fit for duty form at the  that needs to be signed by Dr. Mendez. She would like a call when that is done because she needs to pick it back up and give it to her employer. Originally she thought it was suppose to get sent out by us to [employer], but her employer needs that. She is set to go back to work 11-5 but would like to know if the date can be changed to 11-4. Please return call to her with an update of when she can  the signed fit for duty form. Thank you

## 2024-10-30 NOTE — TELEPHONE ENCOUNTER
Caller: Renu Galo    Doctor: Andrea    Reason for call: Renu is just following up on the back to work form that needs to be filled out.  Can someone from the office reach out to her with the status?  Thank you.     Call back#: [phone]

## 2024-10-31 DIAGNOSIS — I83.811 VARICOSE VEINS OF RIGHT LOWER EXTREMITY WITH PAIN: ICD-10-CM

## 2024-10-31 DIAGNOSIS — I83.91 VARICOSE VEINS OF RIGHT LOWER LEG: Primary | ICD-10-CM

## 2024-10-31 DIAGNOSIS — I83.891 VARICOSE VEINS OF RIGHT LOWER EXTREMITY WITH EDEMA: ICD-10-CM

## 2024-11-04 ENCOUNTER — PREP FOR PROCEDURE (OUTPATIENT)
Dept: VASCULAR SURGERY | Facility: CLINIC | Age: 49
End: 2024-11-04

## 2024-12-18 ENCOUNTER — TELEPHONE (OUTPATIENT)
Dept: ENDOCRINOLOGY | Facility: CLINIC | Age: 49
End: 2024-12-18

## 2024-12-18 NOTE — TELEPHONE ENCOUNTER
Called patient to reschedule missed appointment. Letter not necessary patient was scheduled 12- and was rescheduled for 12-.

## 2025-01-06 ENCOUNTER — OFFICE VISIT (OUTPATIENT)
Dept: URGENT CARE | Facility: CLINIC | Age: 50
End: 2025-01-06
Payer: COMMERCIAL

## 2025-01-06 VITALS
DIASTOLIC BLOOD PRESSURE: 65 MMHG | HEART RATE: 71 BPM | OXYGEN SATURATION: 100 % | RESPIRATION RATE: 16 BRPM | SYSTOLIC BLOOD PRESSURE: 114 MMHG | TEMPERATURE: 98.9 F

## 2025-01-06 DIAGNOSIS — J06.9 VIRAL UPPER RESPIRATORY TRACT INFECTION: Primary | ICD-10-CM

## 2025-01-06 DIAGNOSIS — R11.2 NAUSEA AND VOMITING, UNSPECIFIED VOMITING TYPE: ICD-10-CM

## 2025-01-06 PROCEDURE — 99213 OFFICE O/P EST LOW 20 MIN: CPT | Performed by: NURSE PRACTITIONER

## 2025-01-06 RX ORDER — ONDANSETRON 8 MG/1
8 TABLET, FILM COATED ORAL EVERY 8 HOURS PRN
Qty: 20 TABLET | Refills: 0 | Status: SHIPPED | OUTPATIENT
Start: 2025-01-06

## 2025-01-06 NOTE — PROGRESS NOTES
Tomer Weiss   9/4/2018 4:00 PM   Anticoagulation Therapy Visit    Description:  88 year old male   Provider:  [facility]   Department:  [facility]           INR as of 9/4/2018     Today's INR 2.2      Anticoagulation Summary as of 9/4/2018     INR goal 2.0-3.0   Today's INR 2.2   Full warfarin instructions 5 mg on Mon, Wed, Fri; 2.5 mg all other days   Next INR check 10/12/2018    Indications   Long-term (current) use of anticoagulants [Z79.01] [Z79.01]  Chronic atrial fibrillation (H) [I48.2]         Your next Anticoagulation Clinic appointment(s)     Oct 12, 2018  8:30 AM CDT   Anticoagulation Visit with [facility]   [facility] ([facility]    [address] [facility] [address] [phone]              Contact Numbers     Clinic Number:         September 2018 Details    Sun Mon Tue Wed Thu Fri Sat           1                 2               3               4      2.5 mg   See details      5      5 mg         6      2.5 mg         7      5 mg         8      2.5 mg           9      2.5 mg         10      5 mg         11      2.5 mg         12      5 mg         13      2.5 mg         14      5 mg         15      2.5 mg           16      2.5 mg         17      5 mg         18      2.5 mg         19      5 mg         20      2.5 mg         21      5 mg         22      2.5 mg           23      2.5 mg         24      5 mg         25      2.5 mg         26      5 mg         27      2.5 mg         28      5 mg         29      2.5 mg           30      2.5 mg                Date Details   09/04 This INR check               How to take your warfarin dose     To take:  2.5 mg Take 0.5 of a 5 mg tablet.    To take:  5 mg Take 1 of the 5 mg tablets.           October 2018 Details    Sun Mon Tue Wed Thu Fri Sat      1      5 mg         2      2.5 mg         3      5 mg         4      2.5 mg         5      5 mg         6      2.5 mg           7      2.5 mg            [facility]        NAME: Prachi Galo is a 49 y.o. female  : 1975    MRN: [id number]  DATE: 2025  TIME: 5:08 PM    Assessment and Plan   Viral upper respiratory tract infection [J06.9]  1. Viral upper respiratory tract infection        2. Nausea and vomiting, unspecified vomiting type  ondansetron (ZOFRAN) 8 mg tablet            Patient Instructions     Patient Instructions   --Rest, drink plenty of fluids. Avoid dairy for now.  Toone solids as tolerated.    --Rx antinausea mediation as needed  --Consider vitamin C, zinc, quercetin, and vitamin D to help strengthen your immune system  --For cough, you can take an OTC expectorant such as plain Robitussion or Mucinex (active ingredient guaifenesin).  A spoonful of honey at bedtime may also be helpful, as may a prescription cough medicine.  Also recommended is the use of a cool mist humidifier (with or without Vicks) in the bedroom at night.   --For sore throat, you can take OTC lozenges, use warm gargles (salt water or apple cider vinegar and honey), herbal teas, or an OTC throat spray (Chloraseptic).  --For nasal/sinus congestion, helpful measures include steam, warm compresses, an OTC saline nasal spray or Neti pot, or an OTC decongestant (such as Sudafed).  The decongestant should be avoided, however, if you are under 6 years of age, or have a history of high blood pressure or heart disease.  In addition, an OTC nasal steroid (Flonase, Nasocort) or nasal decongestant (Afrin, Ruben-synephrine) may be taken.  The nasal steroid should be used at bedtime, after the saline nasal spray. The nasal decongestant should not be taken more than 3 days consecutively in order to prevent rebound congestion.   --For nasal drainage, postnasal drip, sneezing and itching, an OTC antihistamine (Allegra, Benadryl, etc) can be taken.   --You can take Tylenol or Motrin/Advil as needed for fever, headache, body aches. Motrin/Advil should be avoided, however,  8      5 mg         9      2.5 mg         10      5 mg         11      2.5 mg         12            13                 14               15               16               17               18               19               20                 21               22               23               24               25               26               27                 28               29               30               31                   Date Details   No additional details    Date of next INR:  10/12/2018         How to take your warfarin dose     To take:  2.5 mg Take 0.5 of a 5 mg tablet.    To take:  5 mg Take 1 of the 5 mg tablets.            if you have a history of heart disease, bleeding ulcers, or if you take blood thinners.   --You should contact your primary care provider and/or go to the ER if your symptoms are not improved or get worse over the next 7 days.  This includes new onset fever, localized ear pain, sinus pain, as well as worsening cough, chest pain, shortness of breath, or significant weakness/fatigue.          If tests have been performed at [facility], our office will contact you with results if changes need to be made to the care plan discussed with you at the visit.  You can review your full results on [facility].    Chief Complaint     Chief Complaint   Patient presents with    Flu Symptoms     Started today with dizziness, fatigue, body aches, nausea, vomited x1, now sore throat         History of Present Illness       Here with complaints of sore throat, mild cough, headache, body aches, chills, N/V/D x 1 day.   No known fever.    Minimal nasal symptoms thus far.    No dyspnea, wheezing.   Threw up twice, NBNB, with some ongoing nausea.  Small amount of diarrhea.    Lightheaded at times, but drinking and urinating OK.    No stiff neck or photophobia.    No OTC meds taken.    No known sick contacts, but works as .          Review of Systems   Review of Systems   Constitutional:  Negative for fever.   HENT:  Positive for sore throat. Negative for rhinorrhea.    Respiratory:  Positive for cough.    Gastrointestinal:  Positive for nausea and vomiting. Negative for abdominal pain and diarrhea.   Musculoskeletal:  Positive for myalgias.   Neurological:  Positive for headaches.         Current Medications       Current Outpatient Medications:     levothyroxine 150 mcg tablet, Take 1 tablet (150 mcg total) by mouth daily, Disp: 90 tablet, Rfl: 2    ondansetron (ZOFRAN) 8 mg tablet, Take 1 tablet (8 mg total) by mouth every 8 (eight) hours as needed for nausea or vomiting, Disp: 20 tablet, Rfl: 0    VITAMIN D PO,  Take by mouth daily, Disp: , Rfl:     oxyCODONE-acetaminophen (Percocet) 5-325 mg per tablet, Take 1 tablet by mouth every 4 (four) hours as needed for moderate pain for up to 20 doses Max Daily Amount: 6 tablets (Patient not taking: Reported on 10/7/2024), Disp: 20 tablet, Rfl: 0    Current Allergies     Allergies as of 01/06/2025    (No Known Allergies)            The following portions of the patient's history were reviewed and updated as appropriate: allergies, current medications, past family history, past medical history, past social history, past surgical history and problem list.     Past Medical History:   Diagnosis Date    Anxiety 4/16/2020    COVID-19 11/13/2021    tested positive    Disease of thyroid gland 2017    Headache(784.0) Various    History of ovarian cyst     Hypothyroidism     Obesity (BMI 30-39.9)        Past Surgical History:   Procedure Laterality Date    PELVIC LAPAROSCOPY Right 05/03/2021    Procedure: RIGHT LAPAROSCOPIC OVARIAN CYSTECTOMY;  Surgeon: Shira Benitez DO;  Location: [facility];  Service: Gynecology    NC CORRJ HLX VLGS BNCTY SESMDC JOINT ARTHRODESIS Left 9/27/2024    Procedure: BUNIONECTOMY LAPIPLASTY; NANCY OSTEOTOMY;  Surgeon: Luis A Mendez DPM;  Location: [facility];  Service: Podiatry    NC LAPAROSCOPY W/RMVL ADNEXAL STRUCTURES Bilateral 05/03/2021    Procedure: SALPINGECTOMY, LAPAROSCOPIC;  Surgeon: Shira Benitez DO;  [facility];  Service: Gynecology    NC LAPS ABD PRTM&OMENTUM DX W/WO SPEC BR/WA SPX N/A 05/03/2021    Procedure: LAPAROSCOPY DIAGNOSTIC;  Surgeon: Shira Benitez DO;  [facility];  Service: Gynecology    NC REMOVAL INTRAUTERINE DEVICE IUD N/A 05/03/2021    Procedure: REMOVAL OF INTRAUTERINE DEVICE (IUD);  Surgeon: Shira Benitez DO;  [facility];  Service: Gynecology    TUBAL LIGATION         Family History   Problem Relation Age of Onset    Depression Mother     Drug abuse Mother     Anxiety  disorder Mother         anxiety and depression    Heart disease Mother         dx in 20's    Thyroid disease Mother     Bipolar disorder Mother     Heart failure Mother     Arthritis Mother     Suicide Attempts Mother     Hypothyroidism Mother     Heart disease Father         dx in 40's    Alcohol abuse Father     Heart attack Father     Diabetes unspecified Father     Alzheimer's disease Maternal Grandmother     Dementia Maternal Grandmother     Arthritis Maternal Grandmother     Alcohol abuse Maternal Grandfather     Diabetes Maternal Grandfather         Alcohol related    Diabetes unspecified Maternal Grandfather     No Known Problems Sister     No Known Problems Brother     Allergies Son     ADD / ADHD Son     Bipolar disorder Son     ADD / ADHD Son     Alcohol abuse Maternal Uncle     Alcohol abuse Maternal Uncle     Drug abuse Maternal Uncle     Breast cancer Neg Hx     Cervical cancer Neg Hx     Colon cancer Neg Hx     Ovarian cancer Neg Hx     Uterine cancer Neg Hx          Medications have been verified.        Objective   /65   Pulse 71   Temp 98.9 °F (37.2 °C) (Temporal)   Resp 16   SpO2 100%   No LMP recorded.       Physical Exam     Physical Exam  Constitutional:       General: She is not in acute distress.     Appearance: Normal appearance. She is well-developed. She is not ill-appearing, toxic-appearing or diaphoretic.   HENT:      Head: Normocephalic.      Right Ear: Tympanic membrane, ear canal and external ear normal.      Left Ear: Tympanic membrane, ear canal and external ear normal.      Nose: No congestion or rhinorrhea.      Mouth/Throat:      Pharynx: No oropharyngeal exudate or posterior oropharyngeal erythema.   Eyes:      General:         Right eye: No discharge.         Left eye: No discharge.   Cardiovascular:      Rate and Rhythm: Normal rate and regular rhythm.      Heart sounds: Normal heart sounds. No murmur heard.  Pulmonary:      Effort: Pulmonary effort is normal. No  respiratory distress.      Breath sounds: Normal breath sounds. No stridor. No wheezing, rhonchi or rales.      Comments: No cough noted.   Chest:      Chest wall: No tenderness.   Abdominal:      General: Abdomen is flat. Bowel sounds are normal. There is no distension.      Palpations: Abdomen is soft.      Tenderness: There is no abdominal tenderness.   Musculoskeletal:      Cervical back: Neck supple. No rigidity.   Lymphadenopathy:      Cervical: No cervical adenopathy.   Skin:     General: Skin is warm and dry.   Neurological:      Mental Status: She is alert and oriented to person, place, and time.      Deep Tendon Reflexes: Reflexes are normal and symmetric.   Psychiatric:         Mood and Affect: Mood normal.

## 2025-01-06 NOTE — PATIENT INSTRUCTIONS
--Rest, drink plenty of fluids. Avoid dairy for now.  Riley solids as tolerated.    --Rx antinausea mediation as needed  --Consider vitamin C, zinc, quercetin, and vitamin D to help strengthen your immune system  --For cough, you can take an OTC expectorant such as plain Robitussion or Mucinex (active ingredient guaifenesin).  A spoonful of honey at bedtime may also be helpful, as may a prescription cough medicine.  Also recommended is the use of a cool mist humidifier (with or without Vicks) in the bedroom at night.   --For sore throat, you can take OTC lozenges, use warm gargles (salt water or apple cider vinegar and honey), herbal teas, or an OTC throat spray (Chloraseptic).  --For nasal/sinus congestion, helpful measures include steam, warm compresses, an OTC saline nasal spray or Neti pot, or an OTC decongestant (such as Sudafed).  The decongestant should be avoided, however, if you are under 6 years of age, or have a history of high blood pressure or heart disease.  In addition, an OTC nasal steroid (Flonase, Nasocort) or nasal decongestant (Afrin, Ruben-synephrine) may be taken.  The nasal steroid should be used at bedtime, after the saline nasal spray. The nasal decongestant should not be taken more than 3 days consecutively in order to prevent rebound congestion.   --For nasal drainage, postnasal drip, sneezing and itching, an OTC antihistamine (Allegra, Benadryl, etc) can be taken.   --You can take Tylenol or Motrin/Advil as needed for fever, headache, body aches. Motrin/Advil should be avoided, however, if you have a history of heart disease, bleeding ulcers, or if you take blood thinners.   --You should contact your primary care provider and/or go to the ER if your symptoms are not improved or get worse over the next 7 days.  This includes new onset fever, localized ear pain, sinus pain, as well as worsening cough, chest pain, shortness of breath, or significant weakness/fatigue.

## 2025-01-19 ENCOUNTER — ANESTHESIA EVENT (OUTPATIENT)
Dept: PERIOP | Facility: AMBULARY SURGERY CENTER | Age: 50
End: 2025-01-19
Payer: COMMERCIAL

## 2025-01-19 NOTE — ANESTHESIA PREPROCEDURE EVALUATION
Procedure:  right GSV EVLT and right leg phlebectomies (Right: Leg Upper)    Relevant Problems   CARDIO   (+) Pure hypercholesterolemia   (+) Varicose veins of right lower extremity with edema      ENDO   (+) Acquired hypothyroidism      GI/HEPATIC   (+) Acid reflux   (+) Gastroesophageal reflux disease without esophagitis      NEURO/PSYCH   (+) Anxiety   (+) Current moderate episode of major depressive disorder without prior episode (HCC)      PULMONARY   (+) Shortness of breath      URI/Flu symptoms early Jan 2025    NPO 6pm 2/2/25    Physical Exam    Airway    Mallampati score: II  TM Distance: >3 FB  Neck ROM: full     Dental       Cardiovascular  Cardiovascular exam normal    Pulmonary  Pulmonary exam normal     Other Findings  post-pubertal.      Anesthesia Plan  ASA Score- 2     Anesthesia Type- general with ASA Monitors.         Additional Monitors:     Airway Plan: LMA.           Plan Factors-Exercise tolerance (METS): >4 METS.    Chart reviewed. EKG reviewed.  Existing labs reviewed. Patient summary reviewed.    Patient is not a current smoker.  Patient did not smoke on day of surgery.    Obstructive sleep apnea risk education given perioperatively.        Induction- intravenous.    Postoperative Plan- Plan for postoperative opioid use. Planned trial extubation    Perioperative Resuscitation Plan - Level 1 - Full Code.       Informed Consent- Anesthetic plan and risks discussed with patient and spouse.  I personally reviewed this patient with the CRNA. Discussed and agreed on the Anesthesia Plan with the CRNA..      NPO Status:  No vitals data found for the desired time range.

## 2025-01-23 NOTE — PRE-PROCEDURE INSTRUCTIONS
Pre-Surgery Instructions:   Medication Instructions    levothyroxine 150 mcg tablet Take day of surgery.    VITAMIN D PO Stop taking 7 days prior to surgery.    Medication instructions for day surgery reviewed. Please use only a sip of water to take your instructed medications. Avoid all over the counter vitamins, supplements and NSAIDS for one week prior to surgery per anesthesia guidelines. Tylenol is ok to take as needed.     You will receive a call one business day prior to surgery with an arrival time and hospital directions. If your surgery is scheduled on a Monday, the hospital will be calling you on the Friday prior to your surgery. If you have not heard from anyone by 8pm, please call the hospital supervisor through the hospital  at [phone]. ([place] [phone] or [place] [phone]).    Do not eat or drink anything after midnight the night before your surgery, including candy, mints, lifesavers, or chewing gum. Do not drink alcohol 24hrs before your surgery. Try not to smoke at least 24hrs before your surgery.       Follow the pre surgery showering instructions as listed in the “My Surgical Experience Booklet” or otherwise provided by your surgeon's office. Do not use a blade to shave the surgical area 1 week before surgery. It is okay to use a clean electric clippers up to 24 hours before surgery. Do not apply any lotions, creams, including makeup, cologne, deodorant, or perfumes after showering on the day of your surgery. Do not use dry shampoo, hair spray, hair gel, or any type of hair products.     No contact lenses, eye make-up, or artificial eyelashes. Remove nail polish, including gel polish, and any artificial, gel, or acrylic nails if possible. Remove all jewelry including rings and body piercing jewelry.     Wear causal clothing that is easy to take on and off. Consider your type of surgery.    Keep any valuables, jewelry, piercings at home. Please bring any specially ordered  equipment (sling, braces) if indicated.    Arrange for a responsible person to drive you to and from the hospital on the day of your surgery. Please confirm the visitor policy for the day of your procedure when you receive your phone call with an arrival time.     Call the surgeon's office with any new illnesses, exposures, or additional questions prior to surgery.    Please reference your “My Surgical Experience Booklet” for additional information to prepare for your upcoming surgery.

## 2025-01-24 ENCOUNTER — APPOINTMENT (OUTPATIENT)
Dept: LAB | Facility: CLINIC | Age: 50
End: 2025-01-24
Payer: COMMERCIAL

## 2025-01-24 DIAGNOSIS — I83.91 VARICOSE VEINS OF RIGHT LOWER LEG: ICD-10-CM

## 2025-01-24 DIAGNOSIS — I83.891 VARICOSE VEINS OF RIGHT LOWER EXTREMITY WITH EDEMA: ICD-10-CM

## 2025-01-24 DIAGNOSIS — I83.811 VARICOSE VEINS OF RIGHT LOWER EXTREMITY WITH PAIN: ICD-10-CM

## 2025-01-24 LAB
ANION GAP SERPL CALCULATED.3IONS-SCNC: 6 MMOL/L (ref 4–13)
BASOPHILS # BLD AUTO: 0.07 THOUSANDS/ΜL (ref 0–0.1)
BASOPHILS NFR BLD AUTO: 1 % (ref 0–1)
BUN SERPL-MCNC: 12 MG/DL (ref 5–25)
CALCIUM SERPL-MCNC: 9.3 MG/DL (ref 8.4–10.2)
CHLORIDE SERPL-SCNC: 105 MMOL/L (ref 96–108)
CO2 SERPL-SCNC: 27 MMOL/L (ref 21–32)
CREAT SERPL-MCNC: 0.79 MG/DL (ref 0.6–1.3)
EOSINOPHIL # BLD AUTO: 0.2 THOUSAND/ΜL (ref 0–0.61)
EOSINOPHIL NFR BLD AUTO: 2 % (ref 0–6)
ERYTHROCYTE [DISTWIDTH] IN BLOOD BY AUTOMATED COUNT: 14 % (ref 11.6–15.1)
GFR SERPL CREATININE-BSD FRML MDRD: 89 ML/MIN/1.73SQ M
GLUCOSE P FAST SERPL-MCNC: 90 MG/DL (ref 65–99)
HCT VFR BLD AUTO: 40.7 % (ref 34.8–46.1)
HGB BLD-MCNC: 13 G/DL (ref 11.5–15.4)
IMM GRANULOCYTES # BLD AUTO: 0.04 THOUSAND/UL (ref 0–0.2)
IMM GRANULOCYTES NFR BLD AUTO: 0 % (ref 0–2)
LYMPHOCYTES # BLD AUTO: 1.64 THOUSANDS/ΜL (ref 0.6–4.47)
LYMPHOCYTES NFR BLD AUTO: 18 % (ref 14–44)
MCH RBC QN AUTO: 28.2 PG (ref 26.8–34.3)
MCHC RBC AUTO-ENTMCNC: 31.9 G/DL (ref 31.4–37.4)
MCV RBC AUTO: 88 FL (ref 82–98)
MONOCYTES # BLD AUTO: 0.77 THOUSAND/ΜL (ref 0.17–1.22)
MONOCYTES NFR BLD AUTO: 8 % (ref 4–12)
NEUTROPHILS # BLD AUTO: 6.65 THOUSANDS/ΜL (ref 1.85–7.62)
NEUTS SEG NFR BLD AUTO: 71 % (ref 43–75)
NRBC BLD AUTO-RTO: 0 /100 WBCS
PLATELET # BLD AUTO: 365 THOUSANDS/UL (ref 149–390)
PMV BLD AUTO: 9.8 FL (ref 8.9–12.7)
POTASSIUM SERPL-SCNC: 4 MMOL/L (ref 3.5–5.3)
RBC # BLD AUTO: 4.61 MILLION/UL (ref 3.81–5.12)
SODIUM SERPL-SCNC: 138 MMOL/L (ref 135–147)
WBC # BLD AUTO: 9.37 THOUSAND/UL (ref 4.31–10.16)

## 2025-01-24 PROCEDURE — 85025 COMPLETE CBC W/AUTO DIFF WBC: CPT

## 2025-01-24 PROCEDURE — 80048 BASIC METABOLIC PNL TOTAL CA: CPT

## 2025-01-24 PROCEDURE — 36415 COLL VENOUS BLD VENIPUNCTURE: CPT

## 2025-02-03 ENCOUNTER — APPOINTMENT (OUTPATIENT)
Dept: ULTRASOUND IMAGING | Facility: AMBULARY SURGERY CENTER | Age: 50
End: 2025-02-03
Payer: COMMERCIAL

## 2025-02-03 ENCOUNTER — HOSPITAL ENCOUNTER (OUTPATIENT)
Facility: AMBULARY SURGERY CENTER | Age: 50
Setting detail: OUTPATIENT SURGERY
Discharge: HOME | End: 2025-02-03
Attending: SURGERY | Admitting: SURGERY
Payer: COMMERCIAL

## 2025-02-03 ENCOUNTER — ANESTHESIA (OUTPATIENT)
Dept: PERIOP | Facility: AMBULARY SURGERY CENTER | Age: 50
End: 2025-02-03
Payer: COMMERCIAL

## 2025-02-03 VITALS
RESPIRATION RATE: 18 BRPM | OXYGEN SATURATION: 98 % | HEART RATE: 69 BPM | HEIGHT: 64 IN | BODY MASS INDEX: 32.44 KG/M2 | DIASTOLIC BLOOD PRESSURE: 57 MMHG | SYSTOLIC BLOOD PRESSURE: 95 MMHG | WEIGHT: 190 LBS | TEMPERATURE: 97.5 F

## 2025-02-03 DIAGNOSIS — I83.91 VARICOSE VEINS OF RIGHT LOWER LEG: ICD-10-CM

## 2025-02-03 DIAGNOSIS — I83.891 VARICOSE VEINS OF RIGHT LOWER EXTREMITY WITH EDEMA: ICD-10-CM

## 2025-02-03 DIAGNOSIS — I83.811 VARICOSE VEINS OF RIGHT LOWER EXTREMITY WITH PAIN: ICD-10-CM

## 2025-02-03 LAB
EXT PREGNANCY TEST URINE: NEGATIVE
EXT. CONTROL: NORMAL

## 2025-02-03 PROCEDURE — NC001 PR NO CHARGE: Performed by: SURGERY

## 2025-02-03 PROCEDURE — C1894 INTRO/SHEATH, NON-LASER: HCPCS | Performed by: SURGERY

## 2025-02-03 PROCEDURE — 81025 URINE PREGNANCY TEST: CPT | Performed by: SURGERY

## 2025-02-03 PROCEDURE — 93971 EXTREMITY STUDY: CPT

## 2025-02-03 PROCEDURE — 37765 STAB PHLEB VEINS XTR 10-20: CPT | Performed by: SURGERY

## 2025-02-03 PROCEDURE — 36478 ENDOVENOUS LASER 1ST VEIN: CPT | Performed by: SURGERY

## 2025-02-03 RX ORDER — HYDROMORPHONE HCL/PF 1 MG/ML
SYRINGE (ML) INJECTION AS NEEDED
Status: DISCONTINUED | OUTPATIENT
Start: 2025-02-03 | End: 2025-02-03

## 2025-02-03 RX ORDER — CHLORHEXIDINE GLUCONATE ORAL RINSE 1.2 MG/ML
15 SOLUTION DENTAL ONCE
Status: COMPLETED | OUTPATIENT
Start: 2025-02-03 | End: 2025-02-03

## 2025-02-03 RX ORDER — KETOROLAC TROMETHAMINE 30 MG/ML
INJECTION, SOLUTION INTRAMUSCULAR; INTRAVENOUS AS NEEDED
Status: DISCONTINUED | OUTPATIENT
Start: 2025-02-03 | End: 2025-02-03

## 2025-02-03 RX ORDER — SODIUM CHLORIDE, SODIUM LACTATE, POTASSIUM CHLORIDE, CALCIUM CHLORIDE 600; 310; 30; 20 MG/100ML; MG/100ML; MG/100ML; MG/100ML
INJECTION, SOLUTION INTRAVENOUS CONTINUOUS PRN
Status: DISCONTINUED | OUTPATIENT
Start: 2025-02-03 | End: 2025-02-03

## 2025-02-03 RX ORDER — PROPOFOL 10 MG/ML
INJECTION, EMULSION INTRAVENOUS AS NEEDED
Status: DISCONTINUED | OUTPATIENT
Start: 2025-02-03 | End: 2025-02-03

## 2025-02-03 RX ORDER — LIDOCAINE HYDROCHLORIDE 10 MG/ML
INJECTION, SOLUTION EPIDURAL; INFILTRATION; INTRACAUDAL; PERINEURAL AS NEEDED
Status: DISCONTINUED | OUTPATIENT
Start: 2025-02-03 | End: 2025-02-03

## 2025-02-03 RX ORDER — MIDAZOLAM HYDROCHLORIDE 2 MG/2ML
INJECTION, SOLUTION INTRAMUSCULAR; INTRAVENOUS AS NEEDED
Status: DISCONTINUED | OUTPATIENT
Start: 2025-02-03 | End: 2025-02-03

## 2025-02-03 RX ORDER — FENTANYL CITRATE 50 UG/ML
INJECTION, SOLUTION INTRAMUSCULAR; INTRAVENOUS AS NEEDED
Status: DISCONTINUED | OUTPATIENT
Start: 2025-02-03 | End: 2025-02-03

## 2025-02-03 RX ORDER — HYDROCODONE BITARTRATE AND ACETAMINOPHEN 5; 325 MG/1; MG/1
1 TABLET ORAL EVERY 6 HOURS PRN
Qty: 20 TABLET | Refills: 0 | Status: SHIPPED | OUTPATIENT
Start: 2025-02-03 | End: 2025-02-13

## 2025-02-03 RX ORDER — DEXAMETHASONE SODIUM PHOSPHATE 10 MG/ML
INJECTION, SOLUTION INTRAMUSCULAR; INTRAVENOUS AS NEEDED
Status: DISCONTINUED | OUTPATIENT
Start: 2025-02-03 | End: 2025-02-03

## 2025-02-03 RX ORDER — FENTANYL CITRATE/PF 50 MCG/ML
25 SYRINGE (ML) INJECTION
Status: DISCONTINUED | OUTPATIENT
Start: 2025-02-03 | End: 2025-02-03 | Stop reason: HOSPADM

## 2025-02-03 RX ORDER — ONDANSETRON 2 MG/ML
4 INJECTION INTRAMUSCULAR; INTRAVENOUS ONCE AS NEEDED
Status: DISCONTINUED | OUTPATIENT
Start: 2025-02-03 | End: 2025-02-03 | Stop reason: HOSPADM

## 2025-02-03 RX ORDER — EPHEDRINE SULFATE 50 MG/ML
INJECTION INTRAVENOUS AS NEEDED
Status: DISCONTINUED | OUTPATIENT
Start: 2025-02-03 | End: 2025-02-03

## 2025-02-03 RX ORDER — HYDROMORPHONE HCL/PF 1 MG/ML
0.5 SYRINGE (ML) INJECTION
Status: DISCONTINUED | OUTPATIENT
Start: 2025-02-03 | End: 2025-02-03 | Stop reason: HOSPADM

## 2025-02-03 RX ORDER — CEFAZOLIN SODIUM 2 G/50ML
2000 SOLUTION INTRAVENOUS ONCE
Status: COMPLETED | OUTPATIENT
Start: 2025-02-03 | End: 2025-02-03

## 2025-02-03 RX ORDER — ONDANSETRON 2 MG/ML
INJECTION INTRAMUSCULAR; INTRAVENOUS AS NEEDED
Status: DISCONTINUED | OUTPATIENT
Start: 2025-02-03 | End: 2025-02-03

## 2025-02-03 RX ORDER — GLYCOPYRROLATE 0.2 MG/ML
INJECTION INTRAMUSCULAR; INTRAVENOUS AS NEEDED
Status: DISCONTINUED | OUTPATIENT
Start: 2025-02-03 | End: 2025-02-03

## 2025-02-03 RX ADMIN — CEFAZOLIN SODIUM 2000 MG: 2 SOLUTION INTRAVENOUS at 11:51

## 2025-02-03 RX ADMIN — LIDOCAINE HYDROCHLORIDE 50 MG: 10 INJECTION, SOLUTION EPIDURAL; INFILTRATION; INTRACAUDAL at 11:42

## 2025-02-03 RX ADMIN — KETOROLAC TROMETHAMINE 15 MG: 30 INJECTION, SOLUTION INTRAMUSCULAR; INTRAVENOUS at 12:51

## 2025-02-03 RX ADMIN — MIDAZOLAM 2 MG: 1 INJECTION INTRAMUSCULAR; INTRAVENOUS at 11:39

## 2025-02-03 RX ADMIN — GLYCOPYRROLATE 0.1 MG: 0.2 INJECTION INTRAMUSCULAR; INTRAVENOUS at 11:55

## 2025-02-03 RX ADMIN — PROPOFOL 20 MG: 10 INJECTION, EMULSION INTRAVENOUS at 12:42

## 2025-02-03 RX ADMIN — SODIUM CHLORIDE, SODIUM LACTATE, POTASSIUM CHLORIDE, AND CALCIUM CHLORIDE: .6; .31; .03; .02 INJECTION, SOLUTION INTRAVENOUS at 11:38

## 2025-02-03 RX ADMIN — FENTANYL CITRATE 50 MCG: 50 INJECTION INTRAMUSCULAR; INTRAVENOUS at 11:42

## 2025-02-03 RX ADMIN — EPHEDRINE SULFATE 10 MG: 50 INJECTION INTRAVENOUS at 11:59

## 2025-02-03 RX ADMIN — ONDANSETRON 4 MG: 2 INJECTION, SOLUTION INTRAMUSCULAR; INTRAVENOUS at 11:50

## 2025-02-03 RX ADMIN — HYDROMORPHONE HYDROCHLORIDE 0.5 MG: 1 INJECTION, SOLUTION INTRAMUSCULAR; INTRAVENOUS; SUBCUTANEOUS at 13:01

## 2025-02-03 RX ADMIN — DEXAMETHASONE SODIUM PHOSPHATE 10 MG: 10 INJECTION INTRAMUSCULAR; INTRAVENOUS at 11:51

## 2025-02-03 RX ADMIN — PROPOFOL 160 MG: 10 INJECTION, EMULSION INTRAVENOUS at 11:42

## 2025-02-03 RX ADMIN — FENTANYL CITRATE 50 MCG: 50 INJECTION INTRAMUSCULAR; INTRAVENOUS at 12:18

## 2025-02-03 RX ADMIN — DEXMEDETOMIDINE 15 MCG: 100 INJECTION, SOLUTION INTRAVENOUS at 11:51

## 2025-02-03 RX ADMIN — CHLORHEXIDINE GLUCONATE 15 ML: 1.2 SOLUTION ORAL at 10:42

## 2025-02-03 RX ADMIN — PROPOFOL 20 MG: 10 INJECTION, EMULSION INTRAVENOUS at 12:18

## 2025-02-03 NOTE — OP NOTE
OPERATIVE REPORT  PATIENT NAME: Prachi Galo    :  1975  MRN: [id number]  Pt Location: [facility] OR ROOM 01    SURGERY DATE: 2/3/2025    Surgeons and Role:     * Yanick Carr DO - Primary   Millie Perez MD Assistant    Preop Diagnosis:  Varicose veins of right lower leg [I83.91]    Post-Op Diagnosis Codes:     * Varicose veins of right lower leg [I83.91]    Procedure(s):  Right - right GSV EVLT and right leg phlebectomies x 14    Specimen(s):  * No specimens in log *    Estimated Blood Loss:   Minimal      Anesthesia Type:   General/LMA    Operative Indications:  Varicose veins of right lower leg [I83.91]      Operative Findings:  Successful closure right great saphenous vein with no evidence of EHIT.        Complications:   None    Procedure and Technique:  Informed consent was obtained and the appropriate part of the patient was correctly identified in the holding area brought to the operating room placed in the supine position after appropriate lines and monitors were placed satisfactory general LMA anesthesia was induced.  The right leg was prepped and draped in the normal sterile fashion circumferentially.  A Jako timeout was performed and a patient received appropriate periprocedural antibiotics.  We began by using ultrasound to identify the right great saphenous vein and image was recorded in the medical record with measurements.  The vein became smaller below the knee I elected to access proximal to the knee there was some small clusters of varicosities coming directly off of the great saphenous vein in the thigh I was able to access the vein above the knee using a micropuncture needle I then advanced a micropuncture wire into the great saphenous vein and exchanged the needle for the micropuncture sheath I then exchanged the dilator and wire for the procedural wire from the EVLT kit.  This was advanced through the great saphenous vein into the deep venous system.  The existing sheath was then  exchanged for the procedural sheath the dilator from the procedural sheath was then exchanged for the laser catheter.  Under ultrasound guidance the laser catheter tip was pulled 5 cm back from the saphenofemoral junction.  At this point generous tumescent anesthesia was instilled circumferentially around the great saphenous vein segment to be treated.  At this point the vein was then treated with a continuous pullback technique with the laser catheter on a setting of 7 W for total energy of 805 J total treatment length of 20 cm and a total time of 115 seconds.  Once this was done duplex was done which showed no color flow in the treated great saphenous vein and no evidence of DVT.    At this point and then turned my attention towards the phlebectomies a total of 14 separate stab incisions were made along the anterior and lateral right calf this was done using an 11 blade the veins were avulsed using vein hooks and fine hemostats as much of the vein as possible was removed.  Direct pressure was held for hemostasis Steri-Strips were applied followed by a multilayer wrap patient tolerated the procedure well.       I was present for the entire procedure.    Patient Disposition:  PACU           SIGNATURE: Yanick Carr DO  DATE: February 3, 2025  TIME: 1:11 PM

## 2025-02-03 NOTE — ANESTHESIA POSTPROCEDURE EVALUATION
Post-Op Assessment Note    CV Status:  Stable  Pain Score: 0    Pain management: adequate    Multimodal analgesia used between 6 hours prior to anesthesia start to PACU discharge    Mental Status:  Alert, awake and sleepy   Hydration Status:  Euvolemic   PONV Controlled:  Controlled   Airway Patency:  Patent     Post Op Vitals Reviewed: Yes    No anethesia notable event occurred.    Staff: Anesthesiologist           Last Filed PACU Vitals:  Vitals Value Taken Time   Temp 97.9    Pulse 61    /57    Resp 10    SpO2 95

## 2025-02-03 NOTE — H&P
"H&P - Vascular Surgery   Name: Prachi Galo 49 y.o. female I MRN: [id number]  Unit/Bed#: OR POOL I Date of Admission: 2/3/2025   Date of Service: 2/3/2025 I Hospital Day: 0     Assessment & Plan    Symptomatic RLE VV  Right GSV EVLT and phlebectomies today    History of Present Illness   Prachi Galo is a 49 y.o. female who presents for procedure.    Review of Systems  I have reviewed the patient's PMH, PSH, Social History, Family History, Meds, and Allergies    Objective :  Temp:  [98 °F (36.7 °C)] 98 °F (36.7 °C)  HR:  [87] 87  BP: (118)/(80) 118/80  Resp:  [16] 16  SpO2:  [98 %] 98 %  O2 Device: None (Room air)    I/O       None            Physical Exam   RRR  Breathing unlabored  Ab soft, nt  RLE VV      Lab Results: I have reviewed the following results:  No results for input(s): \"WBC\", \"HGB\", \"HCT\", \"PLT\", \"BANDSPCT\", \"SODIUM\", \"K\", \"CL\", \"CO2\", \"BUN\", \"CREATININE\", \"GLUC\", \"CAIONIZED\", \"MG\", \"PHOS\", \"AST\", \"ALT\", \"ALB\", \"TBILI\", \"DBILI\", \"ALKPHOS\", \"PTT\", \"INR\", \"HSTNI0\", \"HSTNI2\", \"BNP\", \"LACTICACID\" in the last 72 hours.          VTE Prophylaxis:   "

## 2025-02-03 NOTE — DISCHARGE INSTR - AVS FIRST PAGE
DISCHARGE INSTRUCTIONS  VARICOSE VEIN SURGERY    ACTIVITY:  On the day of your operation, “take it easy”. You can take short walks around the house. When sitting, the leg should be elevated. The preferred position is to have the leg at or above the level of the heart. Starting on the first day after surgery, light walking is encouraged as tolerated. After your ultrasound test, you can resume your normal activity, but no heavy lifting (do not lift more than 15 pounds) or strenuous exercise for 2 weeks.   You should not drive a car until your bandages are removed and you are off all narcotic pain medication.  You may ride in a car.      DIET: Resume your normal diet.  Good nutrition is important for healing of your incision.    DRESSINGS/SURGICAL SITE:  When released from the hospital, you should have a compression bandage in place on the operated leg.  This bandage should feel snug, but not too tight.  If the bandage becomes blood soaked or painfully tight, elevate your leg and call the office ([phone])  You may have surgical glue on your incision sites.   There are stitches present under the skin which will absorb on their own.   The glue is used to cover the incision, assist in closure, and prevent contamination. This adhesive will darken and peel away on its own within one to two weeks. Do not pick at it.    You should shower daily.  Wash incisions daily with soap and water, but do not rub or scrub the incisions; rinse thoroughly and pat dry.      If the operated leg becomes increasingly painful or swollen, or if there is increasing redness or pain around your incision, contact our office.  Some bruising of the skin is common after varicose vein surgery.  This can be lessened by elevation of the leg. Many patients will notice some numbness of the shin, ankle, calf, or the top of the foot. This usually improves with time but may be persistent.  After surgery you can expect bruising, swelling and hard knots on  your leg.  As your body heals the bruising will fade and the swelling and knots will subside.  Apply sunscreen with SPF 30 to incisions while sun bathing for up to one year after surgery to reduce the chances of your incisions darkening.    FOLLOW UP STUDIES:  Your first post-operative appointment will be 2-3 days after your surgery. At this appointment your bandages will be removed, and you will be seen by a Vascular Surgeon, Nurse Practitioner, or Physician Assistant. An appointment for a follow up Doppler ultrasound study will be scheduled on the same day as your follow up appointment.     FOLLOW UP APPOINTMENTS:  Making and keeping follow up appointments and ultrasound tests are important to your recovery.  If you have difficulty making it to or keeping your follow up appointments, call the office.    If you have increased pain, fever >101.5, increased drainage, redness or a bad smell at your surgery site, new coldness/numbness of your arm or leg, please call us immediately and GO directly to the ER.    PLEASE CALL THE OFFICE IF YOU HAVE ANY QUESTIONS  [phone]  [phone] [address], [address], [place] [address]  [address] [place] [address]  [address] [place] [address]  [address] [facility], [place] [address]  [address] [facility], [address], [place] [address]  [address] [facility], [address], [place] [address]  [address] [facility], [address]tr[address], 2nd Floor, [place] [address]  [address] [facility], 1st Floor, [address], [place] [address]  [address] [facility], [facility] [address], [place] [address]  [address] [facility], [address], [place] [address]

## 2025-02-04 PROCEDURE — NC001 PR NO CHARGE: Performed by: SURGERY

## 2025-02-05 ENCOUNTER — HOSPITAL ENCOUNTER (OUTPATIENT)
Dept: VASCULAR ULTRASOUND | Facility: HOSPITAL | Age: 50
Discharge: HOME | End: 2025-02-05
Attending: SURGERY
Payer: COMMERCIAL

## 2025-02-05 ENCOUNTER — OFFICE VISIT (OUTPATIENT)
Dept: VASCULAR SURGERY | Facility: CLINIC | Age: 50
End: 2025-02-05

## 2025-02-05 VITALS
WEIGHT: 190 LBS | HEIGHT: 64 IN | HEART RATE: 66 BPM | OXYGEN SATURATION: 99 % | SYSTOLIC BLOOD PRESSURE: 110 MMHG | RESPIRATION RATE: 16 BRPM | DIASTOLIC BLOOD PRESSURE: 74 MMHG | BODY MASS INDEX: 32.44 KG/M2

## 2025-02-05 DIAGNOSIS — I83.91 VARICOSE VEINS OF RIGHT LOWER LEG: ICD-10-CM

## 2025-02-05 DIAGNOSIS — I83.891 VARICOSE VEINS OF RIGHT LOWER EXTREMITY WITH EDEMA: ICD-10-CM

## 2025-02-05 DIAGNOSIS — I83.891 VARICOSE VEINS OF RIGHT LOWER EXTREMITY WITH EDEMA: Primary | ICD-10-CM

## 2025-02-05 DIAGNOSIS — I83.811 VARICOSE VEINS OF RIGHT LOWER EXTREMITY WITH PAIN: ICD-10-CM

## 2025-02-05 PROCEDURE — 99024 POSTOP FOLLOW-UP VISIT: CPT | Performed by: NURSE PRACTITIONER

## 2025-02-05 PROCEDURE — 93971 EXTREMITY STUDY: CPT

## 2025-02-05 PROCEDURE — 93971 EXTREMITY STUDY: CPT | Performed by: SURGERY

## 2025-02-05 NOTE — LETTER
February 5, 2025     Patient: Prachi Galo  YOB: 1975  Date of Visit: 2/5/2025      To Whom it May Concern:    Prachi Galo is under my professional care. Prachi was seen in my office on 2/5/2025. Prachi may return to work with limitations 2/9/25. Please allow for periodic sitting as needed until 2/26/25. .    If you have any questions or concerns, please don't hesitate to call.         Sincerely,          CAMILLA Cole        CC: No Recipients

## 2025-02-05 NOTE — PROGRESS NOTES
"todayName: Prachi Galo      : 1975      MRN: [id number]  Encounter Provider: CAMILLA Cole  Encounter Date: 2025   Encounter department: [facility]  :  Assessment & Plan  Varicose veins of right lower extremity with edema  49 y.o former smoker with hypothyroidism, anxiety, GERD and HLD with complaints of restless legs, nocturnal cramping and dilated vein itch. Underwent  Right - right GSV EVLT and right leg phlebectomies x 14 on 2/3/25.     Patient presents for post procedure visit.    Plan:  -Patient informed that she may shower and wash her right lower extremity gently with soap and water.  Instructed patient to leave the Steri-Strips in place and that they will fall off in 10 to 14 days.  -Informed patient to continue leg elevation. Resume compression stocking use in 1 week.   -Patient may resume activity as tolerated. Instructed patient to utilize Tylenol as needed for pain.  -Informed patient to notify the office if she develops any worsening pain, edema, warmth, redness, fever, chills, or purulent drainage from incision site.  -Patient will proceed to EVLT duplex immediately after this appointment.  We discussed reasoning behind EVLT duplex and potential outcomes.  -Follow up in the office in 6 weeks with Dr. Carr.             History of Present Illness   HPI  Prachi Galo is a 49 y.o. female who presents today s/p R GSV, EVLT right leg phlebectomy. Patient denies paint to right leg but reports soarness mostly in the right upper thigh area.Patient is doing well. Denies pain. Post op dressing removed in office today.    History obtained from: patient    Review of Systems   Skin:  Positive for wound.     Medical History Reviewed by provider this encounter:  Tobacco  Allergies  Meds  Problems  Med Hx  Surg Hx  Fam Hx     .     Objective   /74 (BP Location: Right arm, Patient Position: Sitting)   Pulse 66   Resp 16   Ht 5' 4\" (1.626 m)   Wt 86.2 kg " (190 lb)   LMP 01/01/2025 (Approximate)   SpO2 99%   BMI 32.61 kg/m²      Physical Exam  Constitutional:       Appearance: Normal appearance.   HENT:      Head: Normocephalic and atraumatic.   Eyes:      Extraocular Movements: Extraocular movements intact.   Pulmonary:      Effort: Pulmonary effort is normal.   Musculoskeletal:         General: Swelling present.      Right lower leg: No edema.      Left lower leg: No edema.   Skin:     General: Skin is warm and dry.      Comments: STAB incision site CDI with steri strips. No drainage, bleeding or sign of infection.Mild ecchymosis.   Neurological:      Mental Status: She is oriented to person, place, and time.   Psychiatric:         Mood and Affect: Mood normal.         Behavior: Behavior normal.               Administrative Statements   I have spent a total time of 15 minutes in caring for this patient on the day of the visit/encounter including Instructions for management, Patient and family education, Impressions, Documenting in the medical record, and Obtaining or reviewing history  .

## 2025-02-05 NOTE — PATIENT INSTRUCTIONS
-You may shower and wash your right lower extremity gently with soap and water.   -leave the Steri-Strips in place, they will fall off in 10 to 14 days.  -continue leg elevation. Resume compression stocking use in 1 week.   -you may resume activity as tolerated. Instructed patient to utilize Tylenol as needed for pain.  - notify the office f you develops any worsening pain, edema, warmth, redness, fever, chills, or purulent drainage from incision site.  -we will call you if any significant findings after your duplex  -Follow up in the office in 6 weeks with Dr. Carr

## 2025-04-24 ENCOUNTER — TELEPHONE (OUTPATIENT)
Dept: FAMILY MEDICINE CLINIC | Facility: CLINIC | Age: 50
End: 2025-04-24

## 2025-06-23 ENCOUNTER — OFFICE VISIT (OUTPATIENT)
Dept: URGENT CARE | Facility: CLINIC | Age: 50
End: 2025-06-23
Payer: COMMERCIAL

## 2025-06-23 VITALS
SYSTOLIC BLOOD PRESSURE: 116 MMHG | RESPIRATION RATE: 16 BRPM | HEART RATE: 85 BPM | DIASTOLIC BLOOD PRESSURE: 56 MMHG | OXYGEN SATURATION: 96 % | TEMPERATURE: 98 F

## 2025-06-23 DIAGNOSIS — K52.9 GASTROENTERITIS: Primary | ICD-10-CM

## 2025-06-23 PROCEDURE — 99213 OFFICE O/P EST LOW 20 MIN: CPT | Performed by: FAMILY MEDICINE

## 2025-06-23 RX ORDER — LOPERAMIDE HYDROCHLORIDE 2 MG/1
2 CAPSULE ORAL 4 TIMES DAILY PRN
Qty: 30 CAPSULE | Refills: 0 | Status: SHIPPED | OUTPATIENT
Start: 2025-06-23

## 2025-06-23 RX ORDER — ONDANSETRON 4 MG/1
4 TABLET, ORALLY DISINTEGRATING ORAL EVERY 6 HOURS PRN
Qty: 20 TABLET | Refills: 0 | Status: SHIPPED | OUTPATIENT
Start: 2025-06-23

## 2025-06-23 NOTE — PROGRESS NOTES
[facility]  Name: Prachi Galo      : 1975      MRN: [id number]  Encounter Provider: Steph Gamez DO  Encounter Date: 2025   Encounter department: [facility]  :  Assessment & Plan  Gastroenteritis    Orders:  •  ondansetron (ZOFRAN-ODT) 4 mg disintegrating tablet; Take 1 tablet (4 mg total) by mouth every 6 (six) hours as needed for nausea or vomiting  •  loperamide (IMODIUM) 2 mg capsule; Take 1 capsule (2 mg total) by mouth 4 (four) times a day as needed for diarrhea  Gastroenteritis treated symptomatically as above  If not improving - follow up with PCP  Abd exam benign.  [place] diet as instructed.          Patient Instructions  Follow up with PCP in 3-5 days.  Proceed to  ER if symptoms worsen.    If tests are performed, our office will contact you with results only if changes need to made to the care plan discussed with you at the visit. You can review your full results on [facility].    Chief Complaint:   Chief Complaint   Patient presents with   • Vomiting     Vomiting for 2 days. Diarrhea yesterday. States of burning and tightness in her abdomen.     History of Present Illness   Vomiting   This is a new problem. The current episode started in the past 7 days. The problem occurs 2 to 4 times per day. The problem has been unchanged. The emesis has an appearance of stomach contents. There has been no fever. Associated symptoms include diarrhea and dizziness. Pertinent negatives include no abdominal pain, arthralgias, chest pain, chills, coughing, fever, headaches, myalgias, sweats, URI or weight loss. Risk factors include ill contacts.         Review of Systems   Constitutional:  Negative for chills, fever and weight loss.   Respiratory:  Negative for cough.    Cardiovascular:  Negative for chest pain.   Gastrointestinal:  Positive for diarrhea and vomiting. Negative for abdominal pain.   Musculoskeletal:  Negative for arthralgias and  myalgias.   Neurological:  Positive for dizziness. Negative for headaches.     Past Medical History   Past Medical History[1]  Past Surgical History[2]  Family History[3]  she reports that she quit smoking about 10 years ago. Her smoking use included cigarettes. She started smoking about 15 years ago. She has a 1.3 pack-year smoking history. She has never used smokeless tobacco. She reports current alcohol use. She reports that she does not use drugs.  Current Outpatient Medications   Medication Instructions   • levothyroxine 150 mcg, Oral, Daily   • loperamide (IMODIUM) 2 mg, Oral, 4 times daily PRN   • ondansetron (ZOFRAN-ODT) 4 mg, Oral, Every 6 hours PRN   • VITAMIN D PO Daily   Allergies[4]     Objective   /56   Pulse 85   Temp 98 °F (36.7 °C) (Temporal)   Resp 16   SpO2 96%      Physical Exam  Vitals and nursing note reviewed.   Constitutional:       General: She is not in acute distress.     Appearance: Normal appearance. She is not ill-appearing or toxic-appearing.   HENT:      Head: Normocephalic and atraumatic.     Cardiovascular:      Rate and Rhythm: Normal rate and regular rhythm.      Heart sounds: No murmur heard.  Pulmonary:      Effort: Pulmonary effort is normal. No respiratory distress.      Breath sounds: Normal breath sounds.   Abdominal:      General: Abdomen is flat. There is no distension.      Palpations: Abdomen is soft.      Tenderness: There is no abdominal tenderness. There is no right CVA tenderness, left CVA tenderness, guarding or rebound.      Comments: Minimal suprapubic tenderness.     Skin:     General: Skin is warm and dry.      Capillary Refill: Capillary refill takes less than 2 seconds.     Neurological:      General: No focal deficit present.      Mental Status: She is alert and oriented to person, place, and time.     Psychiatric:         Mood and Affect: Mood normal.         Behavior: Behavior normal.         Thought Content: Thought content normal.  "        Portions of the record may have been created with voice recognition software.  Occasional wrong word or \"sound a like\" substitutions may have occurred due to the inherent limitations of voice recognition software.  Read the chart carefully and recognize, using context, where substitutions have occurred.         [1]  Past Medical History:  Diagnosis Date   • Anxiety 4/16/2020   • COVID-19 11/13/2021    tested positive   • Disease of thyroid gland 2017   • Headache(784.0) Various   • History of ovarian cyst    • Hypothyroidism    • Obesity (BMI 30-39.9)    [2]  Past Surgical History:  Procedure Laterality Date   • PELVIC LAPAROSCOPY Right 05/03/2021    Procedure: RIGHT LAPAROSCOPIC OVARIAN CYSTECTOMY;  Surgeon: Shira Benitez DO;  [facility];  Service: Gynecology   • FL CORRJ HLX VLGS BNCTY SESMDC JOINT ARTHRODESIS Left 9/27/2024    Procedure: BUNIONECTOMY LAPIPLASTY; NANCY OSTEOTOMY;  Surgeon: Luis A Mendez DPM;  Location: [facility];  Service: Podiatry   • FL ENDOVEN ABLTJ INCMPTNT VEIN XTR LASER 1ST VEIN Right 2/3/2025    Procedure: right GSV EVLT and right leg phlebectomies;  Surgeon: Yanick Carr DO;  [facility];  Service: Vascular   • FL LAPAROSCOPY W/RMVL ADNEXAL STRUCTURES Bilateral 05/03/2021    Procedure: SALPINGECTOMY, LAPAROSCOPIC;  Surgeon: Shira Benitez DO;  [facility];  Service: Gynecology   • FL LAPS ABD PRTM&OMENTUM DX W/WO SPEC BR/WA SPX N/A 05/03/2021    Procedure: LAPAROSCOPY DIAGNOSTIC;  Surgeon: Shira Benitez DO;  [facility];  Service: Gynecology   • FL REMOVAL INTRAUTERINE DEVICE IUD N/A 05/03/2021    Procedure: REMOVAL OF INTRAUTERINE DEVICE (IUD);  Surgeon: Shira Benitez DO;  [facility];  Service: Gynecology   • TUBAL LIGATION     [3]  Family History  Problem Relation Name Age of Onset   • Depression Mother hattie zendejas    • Drug abuse Mother hattie zendejas    • Anxiety disorder Mother " hattie zendejas         anxiety and depression   • Heart disease Mother hattie zendejas         dx in 20's   • Thyroid disease Mother hattie zendejas    • Bipolar disorder Mother hattie zendejas    • Heart failure Mother hattie zendejas    • Arthritis Mother hattie zendejas    • Suicide Attempts Mother hattie zendejas    • Hypothyroidism Mother hattie zendejas    • Heart disease Father Patrice barber         dx in 40's   • Alcohol abuse Father Patrice barber    • Heart attack Father Patrice barber    • Diabetes unspecified Father Patrice barber    • Alzheimer's disease Maternal Grandmother armand zendejas    • Dementia Maternal Grandmother armand zendejas    • Arthritis Maternal Grandmother armand zendejas    • Alcohol abuse Maternal Grandfather bren zendejas sr    • Diabetes Maternal Grandfather bren zendejas sr         Alcohol related   • Diabetes unspecified Maternal Grandfather bren zendejas sr    • No Known Problems Sister     • No Known Problems Brother     • Allergies Son     • ADD / ADHD Son     • Bipolar disorder Son     • ADD / ADHD Son     • Alcohol abuse Maternal Uncle bren zendejas jr    • Alcohol abuse Maternal Uncle adina zendejas    • Drug abuse Maternal Uncle adina zendejas    • Breast cancer Neg Hx     • Cervical cancer Neg Hx     • Colon cancer Neg Hx     • Ovarian cancer Neg Hx     • Uterine cancer Neg Hx     [4]  No Known Allergies

## 2025-06-23 NOTE — LETTER
June 23, 2025     Patient: Prachi Galo   YOB: 1975   Date of Visit: 6/23/2025       To Whom it May Concern:    Prachi Galo was seen in my clinic on 6/23/2025. She may return to work on 6/25.    If you have any questions or concerns, please don't hesitate to call.         Sincerely,          Steph Gamez,         CC: No Recipients

## 2025-06-28 ENCOUNTER — APPOINTMENT (EMERGENCY)
Dept: RADIOLOGY | Facility: HOSPITAL | Age: 50
End: 2025-06-28
Payer: COMMERCIAL

## 2025-06-28 ENCOUNTER — HOSPITAL ENCOUNTER (EMERGENCY)
Facility: HOSPITAL | Age: 50
Discharge: HOME | End: 2025-06-28
Attending: EMERGENCY MEDICINE | Admitting: EMERGENCY MEDICINE
Payer: COMMERCIAL

## 2025-06-28 VITALS
TEMPERATURE: 97.9 F | RESPIRATION RATE: 18 BRPM | HEART RATE: 68 BPM | SYSTOLIC BLOOD PRESSURE: 130 MMHG | DIASTOLIC BLOOD PRESSURE: 70 MMHG | OXYGEN SATURATION: 100 %

## 2025-06-28 DIAGNOSIS — R07.9 CHEST PAIN WITH LOW RISK FOR CARDIAC ETIOLOGY: Primary | ICD-10-CM

## 2025-06-28 DIAGNOSIS — E03.9 HYPOTHYROIDISM, UNSPECIFIED TYPE: ICD-10-CM

## 2025-06-28 LAB
ANION GAP SERPL CALCULATED.3IONS-SCNC: 6 MMOL/L (ref 4–13)
BASOPHILS # BLD AUTO: 0.07 THOUSANDS/ÂΜL (ref 0–0.1)
BASOPHILS NFR BLD AUTO: 1 % (ref 0–1)
BUN SERPL-MCNC: 12 MG/DL (ref 5–25)
CALCIUM SERPL-MCNC: 9 MG/DL (ref 8.4–10.2)
CARDIAC TROPONIN I PNL SERPL HS: <2 NG/L (ref ?–50)
CARDIAC TROPONIN I PNL SERPL HS: <2 NG/L (ref ?–50)
CHLORIDE SERPL-SCNC: 107 MMOL/L (ref 96–108)
CK SERPL-CCNC: 68 U/L (ref 26–192)
CO2 SERPL-SCNC: 24 MMOL/L (ref 21–32)
CREAT SERPL-MCNC: 0.88 MG/DL (ref 0.6–1.3)
EOSINOPHIL # BLD AUTO: 0.15 THOUSAND/ÂΜL (ref 0–0.61)
EOSINOPHIL NFR BLD AUTO: 2 % (ref 0–6)
ERYTHROCYTE [DISTWIDTH] IN BLOOD BY AUTOMATED COUNT: 13.8 % (ref 11.6–15.1)
GFR SERPL CREATININE-BSD FRML MDRD: 77 ML/MIN/1.73SQ M
GLUCOSE SERPL-MCNC: 85 MG/DL (ref 65–140)
HCT VFR BLD AUTO: 35.5 % (ref 34.8–46.1)
HGB BLD-MCNC: 12.3 G/DL (ref 11.5–15.4)
IMM GRANULOCYTES # BLD AUTO: 0.02 THOUSAND/UL (ref 0–0.2)
IMM GRANULOCYTES NFR BLD AUTO: 0 % (ref 0–2)
LYMPHOCYTES # BLD AUTO: 2.14 THOUSANDS/ÂΜL (ref 0.6–4.47)
LYMPHOCYTES NFR BLD AUTO: 22 % (ref 14–44)
MCH RBC QN AUTO: 29.1 PG (ref 26.8–34.3)
MCHC RBC AUTO-ENTMCNC: 34.6 G/DL (ref 31.4–37.4)
MCV RBC AUTO: 84 FL (ref 82–98)
MONOCYTES # BLD AUTO: 0.8 THOUSAND/ÂΜL (ref 0.17–1.22)
MONOCYTES NFR BLD AUTO: 8 % (ref 4–12)
NEUTROPHILS # BLD AUTO: 6.55 THOUSANDS/ÂΜL (ref 1.85–7.62)
NEUTS SEG NFR BLD AUTO: 67 % (ref 43–75)
NRBC BLD AUTO-RTO: 0 /100 WBCS
PLATELET # BLD AUTO: 328 THOUSANDS/UL (ref 149–390)
PMV BLD AUTO: 9 FL (ref 8.9–12.7)
POTASSIUM SERPL-SCNC: 3.7 MMOL/L (ref 3.5–5.3)
RBC # BLD AUTO: 4.23 MILLION/UL (ref 3.81–5.12)
SODIUM SERPL-SCNC: 137 MMOL/L (ref 135–147)
T4 FREE SERPL-MCNC: 0.43 NG/DL (ref 0.61–1.12)
TSH SERPL DL<=0.05 MIU/L-ACNC: 26.76 UIU/ML (ref 0.45–4.5)
WBC # BLD AUTO: 9.73 THOUSAND/UL (ref 4.31–10.16)

## 2025-06-28 PROCEDURE — 99285 EMERGENCY DEPT VISIT HI MDM: CPT | Performed by: EMERGENCY MEDICINE

## 2025-06-28 PROCEDURE — 84443 ASSAY THYROID STIM HORMONE: CPT | Performed by: EMERGENCY MEDICINE

## 2025-06-28 PROCEDURE — 84484 ASSAY OF TROPONIN QUANT: CPT | Performed by: EMERGENCY MEDICINE

## 2025-06-28 PROCEDURE — 71045 X-RAY EXAM CHEST 1 VIEW: CPT

## 2025-06-28 PROCEDURE — 96365 THER/PROPH/DIAG IV INF INIT: CPT

## 2025-06-28 PROCEDURE — 99285 EMERGENCY DEPT VISIT HI MDM: CPT

## 2025-06-28 PROCEDURE — 93005 ELECTROCARDIOGRAM TRACING: CPT

## 2025-06-28 PROCEDURE — 82550 ASSAY OF CK (CPK): CPT | Performed by: EMERGENCY MEDICINE

## 2025-06-28 PROCEDURE — 80048 BASIC METABOLIC PNL TOTAL CA: CPT | Performed by: EMERGENCY MEDICINE

## 2025-06-28 PROCEDURE — 36415 COLL VENOUS BLD VENIPUNCTURE: CPT | Performed by: EMERGENCY MEDICINE

## 2025-06-28 PROCEDURE — 84439 ASSAY OF FREE THYROXINE: CPT | Performed by: EMERGENCY MEDICINE

## 2025-06-28 PROCEDURE — 85025 COMPLETE CBC W/AUTO DIFF WBC: CPT | Performed by: EMERGENCY MEDICINE

## 2025-06-28 RX ORDER — LEVOTHYROXINE SODIUM 200 UG/1
200 TABLET ORAL DAILY
Qty: 30 TABLET | Refills: 6 | Status: SHIPPED | OUTPATIENT
Start: 2025-06-28 | End: 2025-07-28

## 2025-06-28 RX ADMIN — SODIUM CHLORIDE, SODIUM LACTATE, POTASSIUM CHLORIDE, AND CALCIUM CHLORIDE 500 ML: .6; .31; .03; .02 INJECTION, SOLUTION INTRAVENOUS at 18:59

## 2025-06-28 NOTE — ED PROCEDURE NOTE
PROCEDURE  ECG 12 Lead Documentation Only    Date/Time: 6/28/2025 7:41 PM    Performed by: Bart Somers MD  Authorized by: Bart Somers MD    Indications / Diagnosis:  CP/SOB  ECG reviewed by me, the ED Provider: yes    Patient location:  ED  Previous ECG:     Previous ECG:  Unavailable    Comparison to cardiac monitor: Yes    Interpretation:     Interpretation: non-specific    Rate:     ECG rate:  69    ECG rate assessment: normal    Rhythm:     Rhythm: sinus rhythm    Ectopy:     Ectopy: none    QRS:     QRS axis:  Normal    QRS intervals:  Normal  Conduction:     Conduction: normal    ST segments:     ST segments:  Normal  T waves:     T waves: flattening      Flattening:  V1  Q waves:     Q waves:  III  Other findings:     Other findings: U wave    Comments:      NO ECG SIGNS OF ISCHEMIA/ INJURY / R HEART STRAIN / NORMA/PERICARDITIS        Bart Somers MD  06/28/25 1944

## 2025-06-28 NOTE — Clinical Note
Prachi Galo was seen and treated in our emergency department on 6/28/2025.                Diagnosis:     Prachi  may return to work on return date.    She may return on this date: 07/01/2025         If you have any questions or concerns, please don't hesitate to call.      Bart Somers MD    ______________________________           _______________          _______________  Hospital Representative                              Date                                Time

## 2025-06-29 LAB
ATRIAL RATE: 63 BPM
ATRIAL RATE: 69 BPM
P AXIS: 52 DEGREES
P AXIS: 65 DEGREES
PR INTERVAL: 138 MS
PR INTERVAL: 142 MS
QRS AXIS: 19 DEGREES
QRS AXIS: 21 DEGREES
QRSD INTERVAL: 80 MS
QRSD INTERVAL: 88 MS
QT INTERVAL: 414 MS
QT INTERVAL: 428 MS
QTC INTERVAL: 437 MS
QTC INTERVAL: 443 MS
T WAVE AXIS: 47 DEGREES
T WAVE AXIS: 61 DEGREES
VENTRICULAR RATE: 63 BPM
VENTRICULAR RATE: 69 BPM

## 2025-06-29 PROCEDURE — 93010 ELECTROCARDIOGRAM REPORT: CPT | Performed by: STUDENT IN AN ORGANIZED HEALTH CARE EDUCATION/TRAINING PROGRAM

## 2025-06-29 NOTE — ED PROCEDURE NOTE
PROCEDURE  ECG 12 Lead Documentation Only    Date/Time: 6/28/2025 9:45 PM    Performed by: Bart Somers MD  Authorized by: Bart Somers MD    Indications / Diagnosis:  REPEAT ER ECG  ECG reviewed by me, the ED Provider: yes    Patient location:  ED  Previous ECG:     Previous ECG:  Compared to current    Comparison ECG info:  COMPARED TO INITIAL ER ECG-  MORE DIFFUSE PRECORDIAL T WAVE FLATTENING    Similarity:  Changes noted    Comparison to cardiac monitor: Yes    Interpretation:     Interpretation: non-specific    Rate:     ECG rate:  63    ECG rate assessment: normal    Rhythm:     Rhythm: sinus rhythm    Ectopy:     Ectopy: none    QRS:     QRS axis:  Normal    QRS intervals:  Normal  Conduction:     Conduction: normal    ST segments:     ST segments:  Normal  T waves:     T waves: flattening      Flattening:  V1, V2, V3, V4, V5 and V6  Q waves:     Q waves:  V1  Other findings:     Other findings: U wave    Comments:      NO ECG SIGNS OF ISCHEMIA/ INJURY / R HEART STRAIN / NORMA/PERICARDITIS        Bart Somers MD  06/28/25 7575

## 2025-06-29 NOTE — ED PROVIDER NOTES
ED Disposition       None          Assessment & Plan       Medical Decision Making  Pt with chest pain -- - repreoducib le by ems-- after h and p- er md has low suspcion of any acs/ vte/ tad or any severe problems     Problems Addressed:  Chest pain with low risk for cardiac etiology: acute illness or injury     Details: See chart and above   Hypothyroidism, unspecified type: chronic illness or injury    Amount and/or Complexity of Data Reviewed  Independent Historian: spouse  External Data Reviewed: labs, radiology, ECG and notes.     Details: All reviewed by er md   Labs: ordered. Decision-making details documented in ED Course.     Details: All reviewed and compared by er md   Radiology: ordered and independent interpretation performed. Decision-making details documented in ED Course.     Details: All reviewed   ECG/medicine tests: ordered and independent interpretation performed. Decision-making details documented in ED Course.     Details: All reviewed and compared by er md   Discussion of management or test interpretation with external provider(s): Moderate amount of er md thought complexity and workup     Risk  Prescription drug management.  Decision regarding hospitalization.        ED Course as of 07/01/25 2157   Sat Jun 28, 2025 1932 ER MD MEDICAL DECISION MAKING  NOTE-  PT IS LOW RISK FOR PE BY WELLS SCORE- SCORE OF 0-PERC-NEG   1936 CXR PORTABLE- NO OLD CXR FOR COMPARISON - NORMAL MEDIASTINUM/CARDIAC SILHOUETTE- NO FREE/SQ AIR- NO INFILTRATE- PTX- PULM EDEMA- PLERUAL EFFUSIONS    1939 ER MD NOTE- MOST RECENT LABS/ TSH AND TSH TRENDS ALL REVIEWED BY ER MD    1945 ER MD NOTE- PRE HOSP 12 LEAD ECG REVIEWED AND COMPARED BY ER MD- NO SIGN CHANGES   1956 ER MD NOTE- PT RE-EVAL- RESTING IN NAD-  CURRENTLY 100 % SYMPTOM FREE -- Aware of initial eng labs/ trop ecg/cxr- also AwaRE OF ELEVATED SH AND WILL NEED TO SEND T4 - PT STATES COMPLAINT WITH LEVOTHYROXINE 150 MCG DAILY - BTU WILL NEED REFILLS- HAS BEEN  TRYING TO SCHEDULE APPT WITH ENDOCRINOLOGY - BUT STATES ENDOCRINOLOGY HAD TO RE SCHEDULE     2152 ER MD NOTE- ALL ALBS REVIEWED AND COMPARED TO PREVIOUS BY ER MD    2205 Critical Care Time Statement: Upon my evaluation, this patient had a high probability of imminent or life-threatening deterioration due to ACUTE CHEST PAIN REQUIRING SERIAL ER TESTING AND EVALUATIONS , which required my direct attention, intervention, and personal management.  I spent a total of 35 minutes directly providing critical care services, including evaluating for the presence of life-threatening injuries or illnesses. This time is exclusive of procedures, teaching, treating other patients, family meetings, and any prior time recorded by providers other than myself.          Medications   lactated ringers bolus 500 mL (0 mL Intravenous Stopped 6/28/25 1959)       ED Risk Strat Scores                    No data recorded                            History of Present Illness       Chief Complaint   Patient presents with    Chest Pain     Patient arrives from work with sudden onset of chest pain. Describes as burning. EMS gave aspirin.        Past Medical History[1]   Past Surgical History[2]   Family History[3]   Social History[4]   E-Cigarette/Vaping    E-Cigarette Use Former User     Quit Date 1/1/25       E-Cigarette/Vaping Substances    Nicotine Yes     THC No     CBD No     Flavoring Yes     Other No     Unknown No       I have reviewed and agree with the history as documented.     50 YR FEMALE WITH A HX OF  HYPOTHYROIDISM  ON LEVOTHYROXINE-- STATES AT WORK IN  [employer] WITH HECTIC DAY -- C/O R UPPER CHEST BURNING TYPE PAIN WITH MILD ACTIVITY  THEN WITH SOB- BODY ACHES--  THAT LASTED ABOUT 1 HR  THAT EMS CALLED-  PT WITH STABLE VS-- R UPPER CHEST PAIN BY EMS FOUND TO BE REPRODUCIBLE- WITH STABLE VS-- GIVEN 324 MG OG ASA-- PT DENEIS ANY RECENT ILLNESS- NO RECENT EXERTIONAL CP/SOB- NO HX OF VTE-       History provided by:  Patient  and EMS personnel   used: No    Chest Pain  Pain location:  R chest  Pain quality: burning    Pain radiates to:  Does not radiate  Pain severity:  Moderate  Onset quality:  Gradual  Duration:  2 hours  Progression:  Improving  Chronicity:  New  Relieved by:  Nothing  Associated symptoms: shortness of breath    Associated symptoms: no back pain, no cough and no palpitations        Review of Systems   Constitutional: Negative.    HENT: Negative.     Eyes: Negative.    Respiratory:  Positive for shortness of breath. Negative for apnea, cough, choking, chest tightness, wheezing and stridor.    Cardiovascular:  Positive for chest pain. Negative for palpitations and leg swelling.   Gastrointestinal: Negative.    Endocrine: Negative.    Genitourinary: Negative.    Musculoskeletal:  Positive for myalgias. Negative for arthralgias, back pain, gait problem, joint swelling, neck pain and neck stiffness.   Skin: Negative.    Allergic/Immunologic: Negative.    Neurological: Negative.    Hematological: Negative.    Psychiatric/Behavioral: Negative.             Objective       ED Triage Vitals [06/28/25 1839]   Temperature Pulse Blood Pressure Respirations SpO2 Patient Position - Orthostatic VS   97.9 °F (36.6 °C) 72 139/78 18 100 % Sitting      Temp Source Heart Rate Source BP Location FiO2 (%) Pain Score    Oral Monitor Right arm -- --      Vitals      Date and Time Temp Pulse SpO2 Resp BP Pain Score FACES Pain Rating User   06/28/25 1839 -- 72 100 % 18 139/78 -- -- MD   06/28/25 1839 97.9 °F (36.6 °C) -- -- -- -- -- -- PS            Physical Exam  Vitals and nursing note reviewed.   Constitutional:       General: She is not in acute distress.     Appearance: She is well-developed. She is not ill-appearing, toxic-appearing or diaphoretic.      Comments: AVSS-  PULSE  % ON RA- INTERPRETATION IS NORMAL- NO INTERVENTION - IN NAD- NON MARFANOID BODY HABITUS    HENT:      Head: Normocephalic and atraumatic.      Eyes:      Extraocular Movements: Extraocular movements intact.      Pupils: Pupils are equal, round, and reactive to light.      Comments: MM PINK    Neck:      Thyroid: No thyromegaly.      Vascular: No hepatojugular reflux or JVD.      Trachea: No tracheal deviation.      Comments: NO PMT C/T/L/S SPINE   Cardiovascular:      Rate and Rhythm: Normal rate and regular rhythm. No extrasystoles are present.     Chest Wall: PMI is not displaced.      Pulses:           Radial pulses are 3+ on the right side and 3+ on the left side.        Dorsalis pedis pulses are 3+ on the right side and 3+ on the left side.      Heart sounds: Normal heart sounds. Heart sounds not distant. No murmur heard.     No systolic murmur is present.      No diastolic murmur is present.      No friction rub. No gallop. No S3 or S4 sounds.   Pulmonary:      Effort: Pulmonary effort is normal. No tachypnea, accessory muscle usage or respiratory distress.      Breath sounds: Normal breath sounds. No stridor. No decreased breath sounds, wheezing, rhonchi or rales.   Chest:      Chest wall: No mass, deformity, tenderness, crepitus or edema. There is no dullness to percussion.   Abdominal:      General: Bowel sounds are normal. There is no abdominal bruit.      Palpations: Abdomen is soft. There is no fluid wave, hepatomegaly, splenomegaly or mass.      Tenderness: There is no abdominal tenderness. There is no guarding or rebound.      Comments: SOFT NT/ND- NO HSM - NO CVA TENDERNESS- NO PERITONEAL SIGNS      Musculoskeletal:         General: Normal range of motion.      Cervical back: Normal range of motion and neck supple.      Right lower leg: No tenderness. No edema.      Left lower leg: No tenderness. No edema.      Comments: EQUAL BILATERAL RADIAL/DP PULSES- NO BLE EDEMA/CALF TENDERNESS/ASYM/ ERYTHEMA   Lymphadenopathy:      Cervical: No cervical adenopathy.     Skin:     General: Skin is warm.      Capillary Refill: Capillary refill takes  less than 2 seconds.      Coloration: Skin is not cyanotic or pale.      Findings: No ecchymosis, erythema or rash.      Nails: There is no clubbing.     Neurological:      General: No focal deficit present.      Mental Status: She is alert and oriented to person, place, and time.      Cranial Nerves: No cranial nerve deficit.      Motor: No weakness.      Comments: NORMAL NON FOCAL NEURO EXAM- NORMAL CN EXAM- NORMAL BUE/BLE STRENGTH/SENSATION    Psychiatric:         Mood and Affect: Mood normal. Mood is not anxious.         Behavior: Behavior normal. Behavior is not agitated.         Results Reviewed       Procedure Component Value Units Date/Time    T4, free [[id number]]     Lab Status: No result Specimen: Blood     HS Troponin I 4hr [[id number]]     Lab Status: No result Specimen: Blood     TSH [[id number]]  (Abnormal) Collected: 06/28/25 1857    Lab Status: Final result Specimen: Blood from Arm, Left Updated: 06/28/25 1937     TSH 3RD GENERATION 26.763 uIU/mL     HS Troponin I 2hr [[id number]]     Lab Status: No result Specimen: Blood     HS Troponin 0hr (reflex protocol) [[id number]]  (Normal) Collected: 06/28/25 1857    Lab Status: Final result Specimen: Blood from Arm, Left Updated: 06/28/25 1937     hs TnI 0hr <2 ng/L     Basic metabolic panel [[id number]] Collected: 06/28/25 1857    Lab Status: Final result Specimen: Blood from Arm, Left Updated: 06/28/25 1921     Sodium 137 mmol/L      Potassium 3.7 mmol/L      Chloride 107 mmol/L      CO2 24 mmol/L      ANION GAP 6 mmol/L      BUN 12 mg/dL      Creatinine 0.88 mg/dL      Glucose 85 mg/dL      Calcium 9.0 mg/dL      eGFR 77 ml/min/1.73sq m     Narrative:      National Kidney Disease Foundation guidelines for Chronic Kidney Disease (CKD):     Stage 1 with normal or high GFR (GFR > 90 mL/min/1.73 square meters)    Stage 2 Mild CKD (GFR = 60-89 mL/min/1.73 square meters)    Stage 3A Moderate CKD (GFR = 45-59 mL/min/1.73 square meters)    Stage 3B Moderate CKD (GFR  = 30-44 mL/min/1.73 square meters)    Stage 4 Severe CKD (GFR = 15-29 mL/min/1.73 square meters)    Stage 5 End Stage CKD (GFR <15 mL/min/1.73 square meters)  Note: GFR calculation is accurate only with a steady state creatinine    CK [[id number]]  (Normal) Collected: 06/28/25 1857    Lab Status: Final result Specimen: Blood from Arm, Left Updated: 06/28/25 1921     Total CK 68 U/L     CBC and differential [[id number]] Collected: 06/28/25 1857    Lab Status: Final result Specimen: Blood from Arm, Left Updated: 06/28/25 1906     WBC 9.73 Thousand/uL      RBC 4.23 Million/uL      Hemoglobin 12.3 g/dL      Hematocrit 35.5 %      MCV 84 fL      MCH 29.1 pg      MCHC 34.6 g/dL      RDW 13.8 %      MPV 9.0 fL      Platelets 328 Thousands/uL      nRBC 0 /100 WBCs      Segmented % 67 %      Immature Grans % 0 %      Lymphocytes % 22 %      Monocytes % 8 %      Eosinophils Relative 2 %      Basophils Relative 1 %      Absolute Neutrophils 6.55 Thousands/µL      Absolute Immature Grans 0.02 Thousand/uL      Absolute Lymphocytes 2.14 Thousands/µL      Absolute Monocytes 0.80 Thousand/µL      Eosinophils Absolute 0.15 Thousand/µL      Basophils Absolute 0.07 Thousands/µL             XR chest 1 view portable    (Results Pending)       Procedures    ED Medication and Procedure Management   Prior to Admission Medications   Prescriptions Last Dose Informant Patient Reported? Taking?   VITAMIN D PO  Self Yes No   Sig: Take by mouth in the morning.   levothyroxine 150 mcg tablet  Self No No   Sig: Take 1 tablet (150 mcg total) by mouth daily   Patient not taking: Reported on 6/23/2025   loperamide (IMODIUM) 2 mg capsule   No No   Sig: Take 1 capsule (2 mg total) by mouth 4 (four) times a day as needed for diarrhea   ondansetron (ZOFRAN-ODT) 4 mg disintegrating tablet   No No   Sig: Take 1 tablet (4 mg total) by mouth every 6 (six) hours as needed for nausea or vomiting      Facility-Administered Medications: None     Patient's  Medications   Discharge Prescriptions    No medications on file     No discharge procedures on file.  ED SEPSIS DOCUMENTATION              [1]   Past Medical History:  Diagnosis Date    Anxiety 4/16/2020    COVID-19 11/13/2021    tested positive    Disease of thyroid gland 2017    Headache(784.0) Various    History of ovarian cyst     Hypothyroidism     Obesity (BMI 30-39.9)    [2]   Past Surgical History:  Procedure Laterality Date    PELVIC LAPAROSCOPY Right 05/03/2021    Procedure: RIGHT LAPAROSCOPIC OVARIAN CYSTECTOMY;  Surgeon: Shira Benitez DO;  Location: [facility];  Service: Gynecology    ND CORRJ HLX VLGS BNCTY SESMDC JOINT ARTHRODESIS Left 9/27/2024    Procedure: BUNIONECTOMY LAPIPLASTY; NANCY OSTEOTOMY;  Surgeon: Luis A Mendez DPM;  Location: [facility];  Service: Podiatry    ND ENDOVEN ABLTJ INCMPTNT VEIN XTR LASER 1ST VEIN Right 2/3/2025    Procedure: right GSV EVLT and right leg phlebectomies;  Surgeon: Yanick Carr DO;  Location: [facility];  Service: Vascular    ND LAPAROSCOPY W/RMVL ADNEXAL STRUCTURES Bilateral 05/03/2021    Procedure: SALPINGECTOMY, LAPAROSCOPIC;  Surgeon: Shira Benitez DO;  [facility];  Service: Gynecology    ND LAPS ABD PRTM&OMENTUM DX W/WO SPEC BR/WA SPX N/A 05/03/2021    Procedure: LAPAROSCOPY DIAGNOSTIC;  Surgeon: Shira Benitez DO;  [facility];  Service: Gynecology    ND REMOVAL INTRAUTERINE DEVICE IUD N/A 05/03/2021    Procedure: REMOVAL OF INTRAUTERINE DEVICE (IUD);  Surgeon: Shira Benitez DO;  [facility]: [facility];  Service: Gynecology    TUBAL LIGATION     [3]   Family History  Problem Relation Name Age of Onset    Depression Mother hattie zendejas     Drug abuse Mother hattie zendejas     Anxiety disorder Mother hattie zendejas         anxiety and depression    Heart disease Mother hattie zendejas         dx in 20's    Thyroid disease Mother hattie zendejas     Bipolar disorder Mother hattie zendejas      Heart failure Mother hattie zendejas     Arthritis Mother hattie zendejas     Suicide Attempts Mother hattie zendejas     Hypothyroidism Mother hattie zendejas     Heart disease Father Patrice barber         dx in 40's    Alcohol abuse Father Patrice barber     Heart attack Father Patrice barber     Diabetes unspecified Father Patrice barber     Alzheimer's disease Maternal Grandmother armand zendejas     Dementia Maternal Grandmother armand zendejas     Arthritis Maternal Grandmother armand zendejas     Alcohol abuse Maternal Grandfather bren zendejas sr     Diabetes Maternal Grandfather bren zendejas sr         Alcohol related    Diabetes unspecified Maternal Grandfather bren zendejas sr     No Known Problems Sister      No Known Problems Brother      Allergies Son      ADD / ADHD Son      Bipolar disorder Son      ADD / ADHD Son      Alcohol abuse Maternal Uncle bren zendejas jr     Alcohol abuse Maternal Uncle adina zendejas     Drug abuse Maternal Uncle adina zendejas     Breast cancer Neg Hx      Cervical cancer Neg Hx      Colon cancer Neg Hx      Ovarian cancer Neg Hx      Uterine cancer Neg Hx     [4]   Social History  Tobacco Use    Smoking status: Former     Current packs/day: 0.00     Average packs/day: 0.3 packs/day for 5.2 years (1.3 ttl pk-yrs)     Types: Cigarettes     Start date: 12/25/2009     Quit date: 12/25/2014     Years since quitting: 10.5    Smokeless tobacco: Never    Tobacco comments:     quit 3 years ago in 2015   Vaping Use    Vaping status: Former    Quit date: 1/1/2025    Substances: Nicotine, Flavoring   Substance Use Topics    Alcohol use: Yes     Comment: Socially    Drug use: Never        Bart Somers MD  07/01/25 0321

## 2025-06-29 NOTE — DISCHARGE INSTRUCTIONS
DIAGNOSIS; LOW RISK CHEST PAIN - HYPOTHYROIDISM     - BASED ON OUR NEGATIVE ER CHEST PAIN WORKUP- YOU ARE A LOW RISK CHEST PAIN PATIENT  APPROXIMATELY  1 OUT  ER CHEST PAIN PATIENTS LIKE YOU WILL GO ON TO HAVE A HEART ATTACK IN 1 MONTHS TIME     - PLEASE KEEP  YOUR DOCTOR APPOINTMENT FOR MONDAY     - PLEASE RETURN TO  THE ER FOR ANY EXERTIONAL CHEST PAIN/ EXERTIONAL SHORTNESS OF BREATH  OR ANY NEW/ WORSENING/CONCERNING SYMPTOMS TO YOU     - PLEASE START TAKING THE LEVOTHYROXINE 200 MCG TABLET ONCE A DAY - SHOULD HAVE YOUR THYROID HORMONES RECHECKED IN 2 MONTHS - THERE IS AN ORDER IN THE COMPUTER FOR 8/28/25-- YOU ALSO HAVE A REPEAT REFERRAL TO ENDOCRINOLOGY   -

## 2025-06-30 ENCOUNTER — VBI (OUTPATIENT)
Dept: ADMINISTRATIVE | Facility: OTHER | Age: 50
End: 2025-06-30

## 2025-06-30 NOTE — TELEPHONE ENCOUNTER
06/30/25 11:11 AM    Patient contacted post ED visit, [facility] department spoke with patient/caregiver and outreach was successful.    Thank you.  Nehal Zuleta MA  [facility]

## 2025-07-02 ENCOUNTER — OFFICE VISIT (OUTPATIENT)
Dept: FAMILY MEDICINE CLINIC | Facility: CLINIC | Age: 50
End: 2025-07-02
Payer: COMMERCIAL

## 2025-07-02 VITALS
SYSTOLIC BLOOD PRESSURE: 120 MMHG | DIASTOLIC BLOOD PRESSURE: 76 MMHG | BODY MASS INDEX: 30.05 KG/M2 | WEIGHT: 176 LBS | HEART RATE: 77 BPM | OXYGEN SATURATION: 99 % | HEIGHT: 64 IN | RESPIRATION RATE: 16 BRPM

## 2025-07-02 DIAGNOSIS — R07.89 NON-CARDIAC CHEST PAIN: ICD-10-CM

## 2025-07-02 DIAGNOSIS — E03.9 ACQUIRED HYPOTHYROIDISM: ICD-10-CM

## 2025-07-02 DIAGNOSIS — F41.9 ANXIETY: Primary | ICD-10-CM

## 2025-07-02 PROCEDURE — 99214 OFFICE O/P EST MOD 30 MIN: CPT | Performed by: FAMILY MEDICINE

## 2025-07-02 RX ORDER — BUSPIRONE HYDROCHLORIDE 7.5 MG/1
7.5 TABLET ORAL 3 TIMES DAILY
Qty: 30 TABLET | Refills: 0 | Status: SHIPPED | OUTPATIENT
Start: 2025-07-02

## 2025-07-02 NOTE — ASSESSMENT & PLAN NOTE
Orders:  •  busPIRone (BUSPAR) 7.5 mg tablet; Take 1 tablet (7.5 mg total) by mouth 3 (three) times a day  •  Ambulatory referral to Psych Services; Future  She feels overwhelmed and anxious about the health of her  and stress at work, she is willing to take some medication, she has made appointment for the counselor which is few months away, and she needs a referral, which is made.  Start her on BuSpar once a day and then increase twice a day or 3 times a day according to her situation, and she will follow-up with the PCP in a week

## 2025-07-02 NOTE — PROGRESS NOTES
Name: Prachi Galo      : 1975      MRN: [id number]  Encounter Provider: Paula Lind MD  Encounter Date: 2025   Encounter department: [facility]  :  Assessment & Plan  Anxiety    Orders:  •  busPIRone (BUSPAR) 7.5 mg tablet; Take 1 tablet (7.5 mg total) by mouth 3 (three) times a day  •  Ambulatory referral to Psych Services; Future  She feels overwhelmed and anxious about the health of her  and stress at work, she is willing to take some medication, she has made appointment for the counselor which is few months away, and she needs a referral, which is made.  Start her on BuSpar once a day and then increase twice a day or 3 times a day according to her situation, and she will follow-up with the PCP in a week  Non-cardiac chest pain  In the ER she was evaluated for chest pain chest x-ray is normal her EKG was fine and she has normal blood pressure, the chest pain was reproducible, today her exam is normal no chest pain and blood pressure is normal       Acquired hypothyroidism  Levothyroxine dose has been increased to 200 mcg from the ER as her TSH is very high she has appointment with endocrinologist, and will follow-up with them       Advised to have follow-up with the PCP in a week for her anxiety       History of Present Illness   Follow-up from the ER, on  she went to the ER with chest pain and some shortness of breath, she was working as a and she feels overwhelmed at her work, she says she has been having more stress lately, she never had this kind of situation before, her cardiac workup was negative in the ER chest x-ray was normal she is hypothyroid and TSH level is very high so ER physician increase the levothyroxine to 100 mcg and she has appointment with the endocrinologist,  She feels like due to high stress she has to change her job and she would like to take some medication to help her anxiety and she has made appointment with a counselor which is  See Bernadine from [facility] was able to get in and meet with Pt in her home on Monday 4.9 and complete intake paperwork.  She will be doing a more comprehensive assessment on April 23rd.  Will put in a reminder to myself to call pt and remind her that See is coming out.  Ramila   "few months away but she is on waiting list      Review of Systems   Constitutional: Negative.    HENT: Negative.     Eyes: Negative.    Respiratory: Negative.     Cardiovascular: Negative.    Gastrointestinal: Negative.    Endocrine: Negative.    Psychiatric/Behavioral:  The patient is nervous/anxious.        Objective   /76   Pulse 77   Resp 16   Ht 5' 4\" (1.626 m)   Wt 79.8 kg (176 lb)   SpO2 99%   BMI 30.21 kg/m²      Physical Exam  Vitals and nursing note reviewed.   Constitutional:       Appearance: Normal appearance.     Cardiovascular:      Rate and Rhythm: Normal rate.      Heart sounds: No murmur heard.     Comments: No chest tenderness  Pulmonary:      Effort: Pulmonary effort is normal. No respiratory distress.     Musculoskeletal:      Cervical back: Normal range of motion.     Psychiatric:         Mood and Affect: Mood normal.         "

## 2025-07-02 NOTE — ASSESSMENT & PLAN NOTE
Levothyroxine dose has been increased to 200 mcg from the ER as her TSH is very high she has appointment with endocrinologist, and will follow-up with them

## 2025-07-02 NOTE — ASSESSMENT & PLAN NOTE
In the ER she was evaluated for chest pain chest x-ray is normal her EKG was fine and she has normal blood pressure, the chest pain was reproducible, today her exam is normal no chest pain and blood pressure is normal

## 2025-07-16 ENCOUNTER — APPOINTMENT (OUTPATIENT)
Dept: LAB | Facility: CLINIC | Age: 50
End: 2025-07-16
Payer: COMMERCIAL

## 2025-07-16 ENCOUNTER — OFFICE VISIT (OUTPATIENT)
Dept: FAMILY MEDICINE CLINIC | Facility: CLINIC | Age: 50
End: 2025-07-16
Payer: COMMERCIAL

## 2025-07-16 VITALS
SYSTOLIC BLOOD PRESSURE: 118 MMHG | OXYGEN SATURATION: 99 % | DIASTOLIC BLOOD PRESSURE: 78 MMHG | HEART RATE: 75 BPM | WEIGHT: 176 LBS | BODY MASS INDEX: 30.05 KG/M2 | HEIGHT: 64 IN

## 2025-07-16 DIAGNOSIS — Z12.31 ENCOUNTER FOR SCREENING MAMMOGRAM FOR BREAST CANCER: ICD-10-CM

## 2025-07-16 DIAGNOSIS — Z13.220 SCREENING, LIPID: ICD-10-CM

## 2025-07-16 DIAGNOSIS — F41.9 ANXIETY: ICD-10-CM

## 2025-07-16 DIAGNOSIS — E03.9 ACQUIRED HYPOTHYROIDISM: Primary | ICD-10-CM

## 2025-07-16 DIAGNOSIS — Z83.49 FAMILY HISTORY OF THYROID DISEASE: ICD-10-CM

## 2025-07-16 DIAGNOSIS — R53.83 OTHER FATIGUE: ICD-10-CM

## 2025-07-16 DIAGNOSIS — Z12.11 COLON CANCER SCREENING: ICD-10-CM

## 2025-07-16 DIAGNOSIS — E03.9 ACQUIRED HYPOTHYROIDISM: ICD-10-CM

## 2025-07-16 LAB
25(OH)D3 SERPL-MCNC: 29 NG/ML (ref 30–100)
ALBUMIN SERPL BCG-MCNC: 4 G/DL (ref 3.5–5)
ALP SERPL-CCNC: 56 U/L (ref 34–104)
ALT SERPL W P-5'-P-CCNC: 12 U/L (ref 7–52)
ANION GAP SERPL CALCULATED.3IONS-SCNC: 8 MMOL/L (ref 4–13)
AST SERPL W P-5'-P-CCNC: 13 U/L (ref 13–39)
BASOPHILS # BLD AUTO: 0.1 THOUSANDS/ÂΜL (ref 0–0.1)
BASOPHILS NFR BLD AUTO: 2 % (ref 0–1)
BILIRUB SERPL-MCNC: 1.02 MG/DL (ref 0.2–1)
BUN SERPL-MCNC: 10 MG/DL (ref 5–25)
CALCIUM SERPL-MCNC: 9.3 MG/DL (ref 8.4–10.2)
CHLORIDE SERPL-SCNC: 106 MMOL/L (ref 96–108)
CHOLEST SERPL-MCNC: 148 MG/DL (ref ?–200)
CO2 SERPL-SCNC: 26 MMOL/L (ref 21–32)
CREAT SERPL-MCNC: 0.72 MG/DL (ref 0.6–1.3)
EOSINOPHIL # BLD AUTO: 0.16 THOUSAND/ÂΜL (ref 0–0.61)
EOSINOPHIL NFR BLD AUTO: 2 % (ref 0–6)
ERYTHROCYTE [DISTWIDTH] IN BLOOD BY AUTOMATED COUNT: 13.7 % (ref 11.6–15.1)
FERRITIN SERPL-MCNC: 59 NG/ML (ref 30–307)
GFR SERPL CREATININE-BSD FRML MDRD: 99 ML/MIN/1.73SQ M
GLUCOSE P FAST SERPL-MCNC: 83 MG/DL (ref 65–99)
HCT VFR BLD AUTO: 38.3 % (ref 34.8–46.1)
HDLC SERPL-MCNC: 43 MG/DL
HGB BLD-MCNC: 12.5 G/DL (ref 11.5–15.4)
IMM GRANULOCYTES # BLD AUTO: 0.02 THOUSAND/UL (ref 0–0.2)
IMM GRANULOCYTES NFR BLD AUTO: 0 % (ref 0–2)
IRON SATN MFR SERPL: 38 % (ref 15–50)
IRON SERPL-MCNC: 105 UG/DL (ref 50–212)
LDLC SERPL CALC-MCNC: 96 MG/DL (ref 0–100)
LYMPHOCYTES # BLD AUTO: 1.47 THOUSANDS/ÂΜL (ref 0.6–4.47)
LYMPHOCYTES NFR BLD AUTO: 22 % (ref 14–44)
MCH RBC QN AUTO: 28.3 PG (ref 26.8–34.3)
MCHC RBC AUTO-ENTMCNC: 32.6 G/DL (ref 31.4–37.4)
MCV RBC AUTO: 87 FL (ref 82–98)
MONOCYTES # BLD AUTO: 0.62 THOUSAND/ÂΜL (ref 0.17–1.22)
MONOCYTES NFR BLD AUTO: 9 % (ref 4–12)
NEUTROPHILS # BLD AUTO: 4.29 THOUSANDS/ÂΜL (ref 1.85–7.62)
NEUTS SEG NFR BLD AUTO: 65 % (ref 43–75)
NONHDLC SERPL-MCNC: 105 MG/DL
NRBC BLD AUTO-RTO: 0 /100 WBCS
PLATELET # BLD AUTO: 360 THOUSANDS/UL (ref 149–390)
PMV BLD AUTO: 9.6 FL (ref 8.9–12.7)
POTASSIUM SERPL-SCNC: 3.9 MMOL/L (ref 3.5–5.3)
PROT SERPL-MCNC: 7.2 G/DL (ref 6.4–8.4)
RBC # BLD AUTO: 4.41 MILLION/UL (ref 3.81–5.12)
SODIUM SERPL-SCNC: 140 MMOL/L (ref 135–147)
TIBC SERPL-MCNC: 275.8 UG/DL (ref 250–450)
TRANSFERRIN SERPL-MCNC: 197 MG/DL (ref 203–362)
TRIGL SERPL-MCNC: 47 MG/DL (ref ?–150)
UIBC SERPL-MCNC: 171 UG/DL (ref 155–355)
VIT B12 SERPL-MCNC: 340 PG/ML (ref 180–914)
WBC # BLD AUTO: 6.66 THOUSAND/UL (ref 4.31–10.16)

## 2025-07-16 PROCEDURE — 80053 COMPREHEN METABOLIC PANEL: CPT | Performed by: FAMILY MEDICINE

## 2025-07-16 PROCEDURE — 36415 COLL VENOUS BLD VENIPUNCTURE: CPT | Performed by: FAMILY MEDICINE

## 2025-07-16 PROCEDURE — 99214 OFFICE O/P EST MOD 30 MIN: CPT | Performed by: FAMILY MEDICINE

## 2025-07-16 PROCEDURE — 82306 VITAMIN D 25 HYDROXY: CPT | Performed by: FAMILY MEDICINE

## 2025-07-16 PROCEDURE — 85025 COMPLETE CBC W/AUTO DIFF WBC: CPT | Performed by: FAMILY MEDICINE

## 2025-07-16 PROCEDURE — 80061 LIPID PANEL: CPT | Performed by: FAMILY MEDICINE

## 2025-07-16 PROCEDURE — 82607 VITAMIN B-12: CPT | Performed by: FAMILY MEDICINE

## 2025-07-16 PROCEDURE — 82728 ASSAY OF FERRITIN: CPT | Performed by: FAMILY MEDICINE

## 2025-07-16 PROCEDURE — 83550 IRON BINDING TEST: CPT | Performed by: FAMILY MEDICINE

## 2025-07-16 PROCEDURE — 83540 ASSAY OF IRON: CPT | Performed by: FAMILY MEDICINE

## 2025-07-16 NOTE — ASSESSMENT & PLAN NOTE
- was seen in the office on 7/2/2025 - was started on Buspar 7.5mg TID -- anxious and overwhelmed re: health of her spouse and son and stress at work   - referred to [email or web address], [employer] [facility], [facility], [facility]   - advised to download the [facility] on her phone   - RTO in 2-3wks, with labs, for f/u and annual exam - pt aware and agreeable   Orders:    Comprehensive metabolic panel

## 2025-07-16 NOTE — PROGRESS NOTES
Name: Prachi Galo      : 1975      MRN: [id number]  Encounter Provider: Love Draper DO  Encounter Date: 2025   Encounter department: [facility]  :  Assessment & Plan  Acquired hypothyroidism  - pt follows with Endo   - pt was off of her Thyroid medication for 1month   - was seen in the ER on 2025 with reproducible chest pain and difficulty breathing   - in the ER was noted to have TSH of 26.763 with T4 0.43 and was restarted on her Levothyroxine 200mcg QAM   - Thyroid US (2025): No nodules but mildly atrophic gland   - has new Endo appt scheduled for 2025   - (+) Fhx of Thyroid dx in Mom        Family history of thyroid disease         Other fatigue  - could be 2/2 uncontrolled hypothyroidism but will check Fe panel, B12 and Vit D   - RTO in 2-3wks, with labs, for f/u and annual exam - pt aware and agreeable   Orders:    CBC and differential    Iron Panel (Includes Ferritin, Iron Sat%, Iron, and TIBC)    Vitamin B12    Vitamin D 25 hydroxy    Anxiety  - was seen in the office on 2025 - was started on Buspar 7.5mg TID -- anxious and overwhelmed re: health of her spouse and son and stress at work   - referred to [email or web address], [facility], [email or web address], [facility], [facility]   - advised to download the [facility] on her phone   - RTO in 2-3wks, with labs, for f/u and annual exam - pt aware and agreeable   Orders:    Comprehensive metabolic panel    Encounter for screening mammogram for breast cancer  - overdue for annual screening Mammo - script given   Orders:    Mammo screening bilateral w 3d and cad    Colon cancer screening  - overdue for Colon Ca screening - discussed options and pt referred to GI for screening Cscope   Orders:    Ambulatory Referral to Gastroenterology; Future    Screening, lipid    Orders:    Lipid panel           History of Present Illness   HPI  - pt was off of her Thyroid medication for 1month   - was seen in the ER  "on 6/28/2025 with reproducible chest pain and difficulty breathing   - in the ER was noted to have TSH of 26.763 with T4 0.43  - was seen in the office on 7/2/2025 - was started on Buspar 7.5mg TID -- anxious and overwhelmed re: health of her spouse and son and stress at work   - has lost ~20lbs since 9/2024 w/o trying   - no longer with constipation       Review of Systems as per HPI     Objective   /78   Pulse 75   Ht 5' 4\" (1.626 m)   Wt 79.8 kg (176 lb)   SpO2 99%   BMI 30.21 kg/m²      Physical Exam  Vitals reviewed.   Constitutional:       General: She is not in acute distress.     Appearance: Normal appearance. She is not ill-appearing, toxic-appearing or diaphoretic.   HENT:      Head: Normocephalic and atraumatic.      Right Ear: External ear normal.      Left Ear: External ear normal.      Nose: Nose normal.     Eyes:      General: No scleral icterus.        Right eye: No discharge.         Left eye: No discharge.      Extraocular Movements: Extraocular movements intact.      Conjunctiva/sclera: Conjunctivae normal.     Pulmonary:      Effort: Pulmonary effort is normal.     Musculoskeletal:         General: Normal range of motion.      Cervical back: Normal range of motion.     Skin:     General: Skin is warm.     Neurological:      General: No focal deficit present.      Mental Status: She is alert and oriented to person, place, and time.     Psychiatric:         Mood and Affect: Mood normal.         Behavior: Behavior normal.         "

## 2025-07-16 NOTE — ASSESSMENT & PLAN NOTE
- could be 2/2 uncontrolled hypothyroidism but will check Fe panel, B12 and Vit D   - RTO in 2-3wks, with labs, for f/u and annual exam - pt aware and agreeable   Orders:    CBC and differential    Iron Panel (Includes Ferritin, Iron Sat%, Iron, and TIBC)    Vitamin B12    Vitamin D 25 hydroxy

## 2025-07-16 NOTE — ASSESSMENT & PLAN NOTE
- pt follows with Endo   - pt was off of her Thyroid medication for 1month   - was seen in the ER on 6/28/2025 with reproducible chest pain and difficulty breathing   - in the ER was noted to have TSH of 26.763 with T4 0.43 and was restarted on her Levothyroxine 200mcg QAM   - Thyroid US (5/9/2025): No nodules but mildly atrophic gland   - has new Endo appt scheduled for 8/2025   - (+) Fhx of Thyroid dx in Mom

## 2025-07-17 ENCOUNTER — TELEPHONE (OUTPATIENT)
Age: 50
End: 2025-07-17

## 2025-08-21 ENCOUNTER — OFFICE VISIT (OUTPATIENT)
Dept: ENDOCRINOLOGY | Facility: HOSPITAL | Age: 50
End: 2025-08-21
Payer: COMMERCIAL

## 2025-08-21 VITALS
HEIGHT: 64 IN | SYSTOLIC BLOOD PRESSURE: 120 MMHG | DIASTOLIC BLOOD PRESSURE: 80 MMHG | WEIGHT: 182.6 LBS | HEART RATE: 65 BPM | BODY MASS INDEX: 31.18 KG/M2

## 2025-08-21 DIAGNOSIS — E03.9 ACQUIRED HYPOTHYROIDISM: ICD-10-CM

## 2025-08-21 DIAGNOSIS — E03.9 HYPOTHYROIDISM, UNSPECIFIED TYPE: Primary | ICD-10-CM

## 2025-08-21 DIAGNOSIS — K90.41 GLUTEN INTOLERANCE: ICD-10-CM

## 2025-08-21 DIAGNOSIS — E66.811 OBESITY (BMI 30.0-34.9): ICD-10-CM

## 2025-08-21 DIAGNOSIS — K90.41 GLUTEN INTOLERANCE: Primary | ICD-10-CM

## 2025-08-21 DIAGNOSIS — E03.9 HYPOTHYROIDISM, UNSPECIFIED TYPE: ICD-10-CM

## 2025-08-21 PROCEDURE — 99215 OFFICE O/P EST HI 40 MIN: CPT | Performed by: STUDENT IN AN ORGANIZED HEALTH CARE EDUCATION/TRAINING PROGRAM

## 2025-08-21 RX ORDER — LEVOTHYROXINE SODIUM 175 MCG
175 TABLET ORAL DAILY
Qty: 60 TABLET | Refills: 1 | Status: SHIPPED | OUTPATIENT
Start: 2025-08-21

## 2025-08-21 RX ORDER — ERGOCALCIFEROL 1.25 MG/1
50000 CAPSULE, LIQUID FILLED ORAL WEEKLY
Qty: 12 CAPSULE | Refills: 1 | Status: SHIPPED | OUTPATIENT
Start: 2025-08-21

## (undated) DEVICE — ULTRASOUND GEL STERILE FOIL PK

## (undated) DEVICE — BLADE SAW 11 X 40MM LAPIPLASTY STRYKER

## (undated) DEVICE — TRAY FOLEY 16FR URIMETER SURESTEP

## (undated) DEVICE — ENSEAL LAPAROSCOPIC TISSUE SEALER G2 ARTICULATING  CURVED JAW FOR USE WITH G2 GENERATOR 5MM DIAMETER 35CM SHAFT LENGTH: Brand: ENSEAL

## (undated) DEVICE — LIGHT HANDLE COVER SLEEVE DISP BLUE STELLAR

## (undated) DEVICE — HEAVY DUTY TABLE COVER: Brand: CONVERTORS

## (undated) DEVICE — CHLORAPREP HI-LITE 26ML ORANGE

## (undated) DEVICE — ABDOMINAL PAD: Brand: DERMACEA

## (undated) DEVICE — MICROPUNCTURE 501

## (undated) DEVICE — 10FR FRAZIER SUCTION HANDLE: Brand: CARDINAL HEALTH

## (undated) DEVICE — COBAN 4 IN STERILE

## (undated) DEVICE — TRITOME™ TRIPLE-EDGE RELEASE INSTRUMENT: Brand: OSTEOTOME

## (undated) DEVICE — GLOVE SRG BIOGEL ECLIPSE 7.5

## (undated) DEVICE — CHLORHEXIDINE 4PCT 4 OZ

## (undated) DEVICE — ANTIBACTERIAL UNDYED BRAIDED (POLYGLACTIN 910), SYNTHETIC ABSORBABLE SUTURE: Brand: COATED VICRYL

## (undated) DEVICE — TUBING INSUFFLATION SET ISO CONNECTOR

## (undated) DEVICE — 5 MM CURVED DISSECTORS WITH MONOPOLAR CAUTERY: Brand: ENDOPATH

## (undated) DEVICE — INTENDED FOR TISSUE SEPARATION, AND OTHER PROCEDURES THAT REQUIRE A SHARP SURGICAL BLADE TO PUNCTURE OR CUT.: Brand: BARD-PARKER SAFETY BLADES SIZE 11, STERILE

## (undated) DEVICE — TOWEL SET X-RAY

## (undated) DEVICE — PREMIUM DRY TRAY LF: Brand: MEDLINE INDUSTRIES, INC.

## (undated) DEVICE — C-ARM: Brand: UNBRANDED

## (undated) DEVICE — KERLIX BANDAGE ROLL: Brand: KERLIX

## (undated) DEVICE — SUT ETHILON 4-0 PS-2 18 IN 1667H

## (undated) DEVICE — BETHLEHEM UNIVERSAL MINOR VAG: Brand: CARDINAL HEALTH

## (undated) DEVICE — BETHLEHEM UNIVERSAL GYN LAP PK: Brand: CARDINAL HEALTH

## (undated) DEVICE — GLOVE INDICATOR PI UNDERGLOVE SZ 6.5 BLUE

## (undated) DEVICE — PROBE COVER: Brand: STERILE PROBE COVER

## (undated) DEVICE — SUT MONOCRYL 4-0 PS-2 27 IN Y426H

## (undated) DEVICE — NEEDLE 25G X 1 1/2

## (undated) DEVICE — DRAPE SHEET THREE QUARTER

## (undated) DEVICE — MAXI PAD5.51 X 13.78 IN. (14.0 X 35.0 CM)HEAVYCONTOUREDUNSCENTED: Brand: CURITY

## (undated) DEVICE — DRAPE C-ARMOUR

## (undated) DEVICE — GLOVE PI ULTRA TOUCH SZ.6.5

## (undated) DEVICE — GLOVE INDICATOR PI UNDERGLOVE SZ 7.5 BLUE

## (undated) DEVICE — FIBER PROC KIT GOLD TIP 21G 45CM NEVERTOUCH

## (undated) DEVICE — NEPTUNE E-SEP SMOKE EVACUATION PENCIL, COATED, 70MM BLADE, PUSH BUTTON SWITCH: Brand: NEPTUNE E-SEP

## (undated) DEVICE — ACE WRAP 6 IN STERILE

## (undated) DEVICE — ACE WRAP 4 IN UNSTERILE

## (undated) DEVICE — UNDER BUTTOCKS DRAPE W/FLUID CONTROL POUCH: Brand: CONVERTORS

## (undated) DEVICE — NEEDLE SPINAL 20G X 3.5 LF

## (undated) DEVICE — CUFF TOURNIQUET 18 X 4 IN QUICK CONNECT DISP 1 BLADDER

## (undated) DEVICE — 3M™ STERI-STRIP™ REINFORCED ADHESIVE SKIN CLOSURES, R1547, 1/2 IN X 4 IN (12 MM X 100 MM), 6 STRIPS/ENVELOPE: Brand: 3M™ STERI-STRIP™

## (undated) DEVICE — MYOSURE SEAL SET

## (undated) DEVICE — INTENDED FOR TISSUE SEPARATION, AND OTHER PROCEDURES THAT REQUIRE A SHARP SURGICAL BLADE TO PUNCTURE OR CUT.: Brand: BARD-PARKER ® CARBON RIB-BACK BLADES

## (undated) DEVICE — ACE WRAP 4 IN STERILE

## (undated) DEVICE — SYRINGE 10ML LL

## (undated) DEVICE — TONGUE DEPRESSOR STERILE

## (undated) DEVICE — PACK FLUENT DISP

## (undated) DEVICE — PRECISION OFFSET (9.0 X 0.64 X 25.0MM)

## (undated) DEVICE — IRRIG ENDO FLO TUBING

## (undated) DEVICE — GAUZE SPONGES,16 PLY: Brand: CURITY

## (undated) DEVICE — SUT VICRYL 4-0 SH 27 IN J415H

## (undated) DEVICE — DRESSING MEPORE FILM ADHESIVE 4 X 5IN

## (undated) DEVICE — ENDOPATH 5MM CURVED SCISSORS WITH MONOPOLAR CAUTERY: Brand: ENDOPATH

## (undated) DEVICE — TUBING SUCTION 5MM X 12 FT

## (undated) DEVICE — BETHLEHEM UNIVERSAL MINOR GEN: Brand: CARDINAL HEALTH

## (undated) DEVICE — TIBURON SPLIT SHEET: Brand: CONVERTORS

## (undated) DEVICE — TROCAR: Brand: KII® SLEEVE

## (undated) DEVICE — GLOVE SRG BIOGEL 7.5

## (undated) DEVICE — GLOVE INDICATOR PI UNDERGLOVE SZ 7 BLUE

## (undated) DEVICE — MAYO STAND COVER: Brand: CONVERTORS

## (undated) DEVICE — DRAPE SHEET X-LG

## (undated) DEVICE — 3M™ TEGADERM™ CHG DRESSING 25/CARTON 4 CARTONS/CASE 1659: Brand: TEGADERM™

## (undated) DEVICE — CV INFILTRATION TUBING SET: Brand: HK CV SINGLE SPIKE INFILTRATION TUBING

## (undated) DEVICE — ASTOUND STANDARD SURGICAL GOWN, XL: Brand: CONVERTORS

## (undated) DEVICE — U-DRAPE: Brand: CONVERTORS

## (undated) DEVICE — ADHESIVE SKIN HIGH VISCOSITY EXOFIN 1ML

## (undated) DEVICE — SPEEDRELEASE™ GUIDED RELEASE INSTRUMENT: Brand: SCALPEL

## (undated) DEVICE — TISSUE RETRIEVAL SYSTEM: Brand: INZII RETRIEVAL SYSTEM

## (undated) DEVICE — TROCAR: Brand: KII FIOS FIRST ENTRY

## (undated) DEVICE — STERILE SURGICAL LUBRICANT,  TUBE: Brand: SURGILUBE